# Patient Record
Sex: FEMALE | Race: WHITE
[De-identification: names, ages, dates, MRNs, and addresses within clinical notes are randomized per-mention and may not be internally consistent; named-entity substitution may affect disease eponyms.]

---

## 2021-12-15 ENCOUNTER — HOSPITAL ENCOUNTER (OUTPATIENT)
Age: 71
End: 2021-12-15
Payer: MEDICARE

## 2021-12-15 DIAGNOSIS — C90.00: ICD-10-CM

## 2021-12-15 DIAGNOSIS — R76.8: ICD-10-CM

## 2021-12-15 DIAGNOSIS — M19.041: ICD-10-CM

## 2021-12-15 DIAGNOSIS — M06.4: Primary | ICD-10-CM

## 2021-12-15 LAB — EXAGEN: (no result)

## 2022-01-26 ENCOUNTER — HOSPITAL ENCOUNTER (OUTPATIENT)
Dept: HOSPITAL 100 - MTLAB | Age: 72
Discharge: ANOTHER HOSPITAL | End: 2022-01-26
Payer: MEDICARE

## 2022-01-26 DIAGNOSIS — C90.00: ICD-10-CM

## 2022-01-26 DIAGNOSIS — M19.042: ICD-10-CM

## 2022-01-26 DIAGNOSIS — M19.041: ICD-10-CM

## 2022-01-26 DIAGNOSIS — R76.8: ICD-10-CM

## 2022-01-26 DIAGNOSIS — M06.4: Primary | ICD-10-CM

## 2022-01-26 LAB
ALANINE AMINOTRANSFER ALT/SGPT: 16 U/L (ref 13–56)
ALBUMIN SERPL-MCNC: 3.5 G/DL (ref 3.2–5)
ALKALINE PHOSPHATASE: 86 U/L (ref 45–117)
ANION GAP: 9 (ref 5–15)
AST(SGOT): 13 U/L (ref 15–37)
BUN SERPL-MCNC: 35 MG/DL (ref 7–18)
BUN/CREAT RATIO: 33.3 RATIO (ref 10–20)
CALCIUM SERPL-MCNC: 8.7 MG/DL (ref 8.5–10.1)
CARBON DIOXIDE: 24 MMOL/L (ref 21–32)
CHLORIDE: 107 MMOL/L (ref 98–107)
CREAT UR-MCNC: 215 MG/DL
CRP SERPL-MCNC: < 2.9 MG/L (ref 0–3)
DEPRECATED RDW RBC: 42.9 FL (ref 35.1–43.9)
ERYTHROCYTE [DISTWIDTH] IN BLOOD: 12.4 % (ref 11.6–14.6)
EST GLOM FILT RATE - AFR AMER: 67 ML/MIN (ref 60–?)
GLOBULIN: 4.9 G/DL (ref 2.2–4.2)
GLUCOSE: 107 MG/DL (ref 74–106)
HCT VFR BLD AUTO: 37 % (ref 37–47)
HEMOGLOBIN: 12 G/DL (ref 12–15)
HGB BLD-MCNC: 12 G/DL (ref 12–15)
IMMATURE GRANULOCYTES COUNT: 0 X10^3/UL (ref 0–0)
MCV RBC: 93.7 FL (ref 81–99)
MEAN CORP HGB CONC: 32.4 G/DL (ref 32–36)
MEAN PLATELET VOL.: 11.4 FL (ref 6.2–12)
NRBC FLAGGED BY ANALYZER: 0 % (ref 0–5)
PLATELET # BLD: 173 K/MM3 (ref 150–450)
PLATELET COUNT: 173 K/MM3 (ref 150–450)
POTASSIUM: 3.7 MMOL/L (ref 3.5–5.1)
PROT UR QL STRIP.AUTO: 15 MG/DL
PROT UR-MCNC: 18.6 MG/DL (ref ?–11.9)
PROT/CREAT UR: 87 MG/G CRE (ref 0–200)
RBC # BLD AUTO: 3.95 M/MM3 (ref 4.2–5.4)
RBC DISTRIBUTION WIDTH CV: 12.4 % (ref 11.6–14.6)
RBC DISTRIBUTION WIDTH SD: 42.9 FL (ref 35.1–43.9)
SP GR UR: 1.02 (ref 1–1.03)
URINE PRESERVATIVE: (no result)
WBC # BLD AUTO: 2.9 K/MM3 (ref 4.4–11)
WHITE BLOOD COUNT: 2.9 K/MM3 (ref 4.4–11)

## 2022-01-26 PROCEDURE — 86140 C-REACTIVE PROTEIN: CPT

## 2022-01-26 PROCEDURE — 85025 COMPLETE CBC W/AUTO DIFF WBC: CPT

## 2022-01-26 PROCEDURE — 82570 ASSAY OF URINE CREATININE: CPT

## 2022-01-26 PROCEDURE — 80053 COMPREHEN METABOLIC PANEL: CPT

## 2022-01-26 PROCEDURE — 72170 X-RAY EXAM OF PELVIS: CPT

## 2022-01-26 PROCEDURE — 85652 RBC SED RATE AUTOMATED: CPT

## 2022-01-26 PROCEDURE — 84156 ASSAY OF PROTEIN URINE: CPT

## 2022-01-26 PROCEDURE — 36415 COLL VENOUS BLD VENIPUNCTURE: CPT

## 2022-01-26 PROCEDURE — 81002 URINALYSIS NONAUTO W/O SCOPE: CPT

## 2022-01-28 LAB
DILUTE RUSSELL VIPER VENOM: 39.8 SEC (ref 0–47)
PTT-LA: 35 SEC (ref 0–51.9)
SCREEN DRVVT: 39.8 SEC (ref 0–47)

## 2022-02-23 ENCOUNTER — HOSPITAL ENCOUNTER (OUTPATIENT)
Dept: HOSPITAL 100 - MTLAB | Age: 72
Discharge: HOME | End: 2022-02-23
Payer: MEDICARE

## 2022-02-23 DIAGNOSIS — Z79.899: ICD-10-CM

## 2022-02-23 DIAGNOSIS — C90.00: ICD-10-CM

## 2022-02-23 DIAGNOSIS — M06.4: Primary | ICD-10-CM

## 2022-02-23 DIAGNOSIS — M19.041: ICD-10-CM

## 2022-02-23 DIAGNOSIS — M35.1: ICD-10-CM

## 2022-02-23 LAB
DEPRECATED RDW RBC: 41.9 FL (ref 35.1–43.9)
ERYTHROCYTE [DISTWIDTH] IN BLOOD: 12.4 % (ref 11.6–14.6)
HCT VFR BLD AUTO: 36.2 % (ref 37–47)
HEMOGLOBIN: 12 G/DL (ref 12–15)
HGB BLD-MCNC: 12 G/DL (ref 12–15)
IMMATURE GRANULOCYTES COUNT: 0 X10^3/UL (ref 0–0)
MCV RBC: 93.1 FL (ref 81–99)
MEAN CORP HGB CONC: 33.1 G/DL (ref 32–36)
MEAN PLATELET VOL.: 11.2 FL (ref 6.2–12)
NRBC FLAGGED BY ANALYZER: 0 % (ref 0–5)
PLATELET # BLD: 184 K/MM3 (ref 150–450)
PLATELET COUNT: 184 K/MM3 (ref 150–450)
RBC # BLD AUTO: 3.89 M/MM3 (ref 4.2–5.4)
RBC DISTRIBUTION WIDTH CV: 12.4 % (ref 11.6–14.6)
RBC DISTRIBUTION WIDTH SD: 41.9 FL (ref 35.1–43.9)
WBC # BLD AUTO: 3.3 K/MM3 (ref 4.4–11)
WHITE BLOOD COUNT: 3.3 K/MM3 (ref 4.4–11)

## 2022-02-23 PROCEDURE — 36415 COLL VENOUS BLD VENIPUNCTURE: CPT

## 2022-02-23 PROCEDURE — 85025 COMPLETE CBC W/AUTO DIFF WBC: CPT

## 2022-05-25 ENCOUNTER — HOSPITAL ENCOUNTER (OUTPATIENT)
Dept: HOSPITAL 100 - MTLAB | Age: 72
Discharge: HOME | End: 2022-05-25
Payer: MEDICARE

## 2022-05-25 DIAGNOSIS — C90.00: ICD-10-CM

## 2022-05-25 DIAGNOSIS — M19.041: ICD-10-CM

## 2022-05-25 DIAGNOSIS — Z79.899: ICD-10-CM

## 2022-05-25 DIAGNOSIS — M06.4: Primary | ICD-10-CM

## 2022-05-25 DIAGNOSIS — M35.1: ICD-10-CM

## 2022-05-25 LAB
ALANINE AMINOTRANSFER ALT/SGPT: 19 U/L (ref 13–56)
ALBUMIN SERPL-MCNC: 3.7 G/DL (ref 3.2–5)
ALKALINE PHOSPHATASE: 69 U/L (ref 45–117)
ANION GAP: 7 (ref 5–15)
AST(SGOT): 14 U/L (ref 15–37)
BUN SERPL-MCNC: 29 MG/DL (ref 7–18)
BUN/CREAT RATIO: 27.4 RATIO (ref 10–20)
CALCIUM SERPL-MCNC: 8.8 MG/DL (ref 8.5–10.1)
CARBON DIOXIDE: 25 MMOL/L (ref 21–32)
CHLORIDE: 106 MMOL/L (ref 98–107)
DEPRECATED RDW RBC: 43.4 FL (ref 35.1–43.9)
ERYTHROCYTE [DISTWIDTH] IN BLOOD: 12.5 % (ref 11.6–14.6)
EST GLOM FILT RATE - AFR AMER: 66 ML/MIN (ref 60–?)
GLOBULIN: 4.5 G/DL (ref 2.2–4.2)
GLUCOSE: 89 MG/DL (ref 74–106)
HCT VFR BLD AUTO: 36.3 % (ref 37–47)
HEMOGLOBIN: 12.1 G/DL (ref 12–15)
HGB BLD-MCNC: 12.1 G/DL (ref 12–15)
IMMATURE GRANULOCYTES COUNT: 0 X10^3/UL (ref 0–0)
MCV RBC: 94.5 FL (ref 81–99)
MEAN CORP HGB CONC: 33.3 G/DL (ref 32–36)
MEAN PLATELET VOL.: 11.5 FL (ref 6.2–12)
NRBC FLAGGED BY ANALYZER: 0 % (ref 0–5)
PLATELET # BLD: 166 K/MM3 (ref 150–450)
PLATELET COUNT: 166 K/MM3 (ref 150–450)
POTASSIUM: 3.8 MMOL/L (ref 3.5–5.1)
RBC # BLD AUTO: 3.84 M/MM3 (ref 4.2–5.4)
RBC DISTRIBUTION WIDTH CV: 12.5 % (ref 11.6–14.6)
RBC DISTRIBUTION WIDTH SD: 43.4 FL (ref 35.1–43.9)
WBC # BLD AUTO: 3 K/MM3 (ref 4.4–11)
WHITE BLOOD COUNT: 3 K/MM3 (ref 4.4–11)

## 2022-05-25 PROCEDURE — 36415 COLL VENOUS BLD VENIPUNCTURE: CPT

## 2022-05-25 PROCEDURE — 80053 COMPREHEN METABOLIC PANEL: CPT

## 2022-05-25 PROCEDURE — 85025 COMPLETE CBC W/AUTO DIFF WBC: CPT

## 2023-02-04 PROBLEM — I10 HYPERTENSION: Status: ACTIVE | Noted: 2023-02-04

## 2023-02-04 PROBLEM — D47.2 SMOLDERING MULTIPLE MYELOMA: Status: ACTIVE | Noted: 2023-02-04

## 2023-02-04 PROBLEM — M35.1 MIXED CONNECTIVE TISSUE DISEASE (MULTI): Status: ACTIVE | Noted: 2023-02-04

## 2023-02-04 PROBLEM — F41.9 ANXIETY DISORDER: Status: ACTIVE | Noted: 2023-02-04

## 2023-02-04 PROBLEM — I48.92 ATRIAL FLUTTER (MULTI): Status: ACTIVE | Noted: 2023-02-04

## 2023-02-04 PROBLEM — I48.0 PAROXYSMAL ATRIAL FIBRILLATION (MULTI): Status: ACTIVE | Noted: 2023-02-04

## 2023-03-07 LAB
ALANINE AMINOTRANSFERASE (SGPT) (U/L) IN SER/PLAS: 10 U/L (ref 7–45)
ALBUMIN (G/DL) IN SER/PLAS: 4.1 G/DL (ref 3.4–5)
ALKALINE PHOSPHATASE (U/L) IN SER/PLAS: 65 U/L (ref 33–136)
ANION GAP IN SER/PLAS: 11 MMOL/L (ref 10–20)
ASPARTATE AMINOTRANSFERASE (SGOT) (U/L) IN SER/PLAS: 14 U/L (ref 9–39)
BASOPHILS (10*3/UL) IN BLOOD BY AUTOMATED COUNT: 0.03 X10E9/L (ref 0–0.1)
BASOPHILS/100 LEUKOCYTES IN BLOOD BY AUTOMATED COUNT: 1.2 % (ref 0–2)
BILIRUBIN TOTAL (MG/DL) IN SER/PLAS: 0.6 MG/DL (ref 0–1.2)
CALCIUM (MG/DL) IN SER/PLAS: 9.2 MG/DL (ref 8.6–10.3)
CARBON DIOXIDE, TOTAL (MMOL/L) IN SER/PLAS: 26 MMOL/L (ref 21–32)
CHLORIDE (MMOL/L) IN SER/PLAS: 105 MMOL/L (ref 98–107)
CREATININE (MG/DL) IN SER/PLAS: 1.01 MG/DL (ref 0.5–1.05)
EOSINOPHILS (10*3/UL) IN BLOOD BY AUTOMATED COUNT: 0.08 X10E9/L (ref 0–0.4)
EOSINOPHILS/100 LEUKOCYTES IN BLOOD BY AUTOMATED COUNT: 3.2 % (ref 0–6)
ERYTHROCYTE DISTRIBUTION WIDTH (RATIO) BY AUTOMATED COUNT: 12.6 % (ref 11.5–14.5)
ERYTHROCYTE MEAN CORPUSCULAR HEMOGLOBIN CONCENTRATION (G/DL) BY AUTOMATED: 32.4 G/DL (ref 32–36)
ERYTHROCYTE MEAN CORPUSCULAR VOLUME (FL) BY AUTOMATED COUNT: 97 FL (ref 80–100)
ERYTHROCYTES (10*6/UL) IN BLOOD BY AUTOMATED COUNT: 3.86 X10E12/L (ref 4–5.2)
GFR FEMALE: 59 ML/MIN/1.73M2
GLUCOSE (MG/DL) IN SER/PLAS: 87 MG/DL (ref 74–99)
HEMATOCRIT (%) IN BLOOD BY AUTOMATED COUNT: 37.4 % (ref 36–46)
HEMOGLOBIN (G/DL) IN BLOOD: 12.1 G/DL (ref 12–16)
IMMATURE GRANULOCYTES/100 LEUKOCYTES IN BLOOD BY AUTOMATED COUNT: 0 % (ref 0–0.9)
LEUKOCYTES (10*3/UL) IN BLOOD BY AUTOMATED COUNT: 2.5 X10E9/L (ref 4.4–11.3)
LYMPHOCYTES (10*3/UL) IN BLOOD BY AUTOMATED COUNT: 1.01 X10E9/L (ref 0.8–3)
LYMPHOCYTES/100 LEUKOCYTES IN BLOOD BY AUTOMATED COUNT: 39.9 % (ref 13–44)
MONOCYTES (10*3/UL) IN BLOOD BY AUTOMATED COUNT: 0.29 X10E9/L (ref 0.05–0.8)
MONOCYTES/100 LEUKOCYTES IN BLOOD BY AUTOMATED COUNT: 11.5 % (ref 2–10)
NEUTROPHILS (10*3/UL) IN BLOOD BY AUTOMATED COUNT: 1.12 X10E9/L (ref 1.6–5.5)
NEUTROPHILS/100 LEUKOCYTES IN BLOOD BY AUTOMATED COUNT: 44.2 % (ref 40–80)
PLATELETS (10*3/UL) IN BLOOD AUTOMATED COUNT: 173 X10E9/L (ref 150–450)
POTASSIUM (MMOL/L) IN SER/PLAS: 4 MMOL/L (ref 3.5–5.3)
PROTEIN TOTAL: 7.7 G/DL (ref 6.4–8.2)
SODIUM (MMOL/L) IN SER/PLAS: 138 MMOL/L (ref 136–145)
UREA NITROGEN (MG/DL) IN SER/PLAS: 33 MG/DL (ref 6–23)

## 2023-03-20 ENCOUNTER — OFFICE VISIT (OUTPATIENT)
Dept: PRIMARY CARE | Facility: CLINIC | Age: 73
End: 2023-03-20
Payer: MEDICARE

## 2023-03-20 VITALS
BODY MASS INDEX: 28.57 KG/M2 | DIASTOLIC BLOOD PRESSURE: 68 MMHG | SYSTOLIC BLOOD PRESSURE: 130 MMHG | WEIGHT: 188.5 LBS | HEIGHT: 68 IN | HEART RATE: 88 BPM

## 2023-03-20 DIAGNOSIS — R42 DIZZINESS: ICD-10-CM

## 2023-03-20 DIAGNOSIS — D47.2 SMOLDERING MULTIPLE MYELOMA: ICD-10-CM

## 2023-03-20 DIAGNOSIS — I48.3 TYPICAL ATRIAL FLUTTER (MULTI): Primary | ICD-10-CM

## 2023-03-20 DIAGNOSIS — M35.1 MIXED CONNECTIVE TISSUE DISEASE (MULTI): ICD-10-CM

## 2023-03-20 DIAGNOSIS — I48.0 PAROXYSMAL ATRIAL FIBRILLATION (MULTI): ICD-10-CM

## 2023-03-20 DIAGNOSIS — I10 PRIMARY HYPERTENSION: ICD-10-CM

## 2023-03-20 DIAGNOSIS — F41.8 OTHER SPECIFIED ANXIETY DISORDERS: ICD-10-CM

## 2023-03-20 PROCEDURE — 1160F RVW MEDS BY RX/DR IN RCRD: CPT | Performed by: FAMILY MEDICINE

## 2023-03-20 PROCEDURE — 3078F DIAST BP <80 MM HG: CPT | Performed by: FAMILY MEDICINE

## 2023-03-20 PROCEDURE — 3075F SYST BP GE 130 - 139MM HG: CPT | Performed by: FAMILY MEDICINE

## 2023-03-20 PROCEDURE — 1159F MED LIST DOCD IN RCRD: CPT | Performed by: FAMILY MEDICINE

## 2023-03-20 PROCEDURE — 1036F TOBACCO NON-USER: CPT | Performed by: FAMILY MEDICINE

## 2023-03-20 PROCEDURE — 99214 OFFICE O/P EST MOD 30 MIN: CPT | Performed by: FAMILY MEDICINE

## 2023-03-20 RX ORDER — TRAMADOL HYDROCHLORIDE 50 MG/1
50 TABLET ORAL EVERY 8 HOURS PRN
COMMUNITY
Start: 2023-03-17 | End: 2024-03-05 | Stop reason: WASHOUT

## 2023-03-20 ASSESSMENT — ENCOUNTER SYMPTOMS: DIZZINESS: 1

## 2023-03-20 NOTE — PROGRESS NOTES
Patient presents for periodic surveillance of chronic medical problems.     Subjective  Mirella Renae is a 72 y.o. female who presents for Annual Exam and Dizziness.  Dizziness        A flutter/a fib, on anticoagulation, sees cardiology  HTN, stable  Dizziness, continues to have episodes, worsening, lasting all day. Cardiologist recommended workup for cardiac amyloid, she is not ready to do so.  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   All other systems reviewed and are negative.  .    Objective     Visit Vitals  /68   Pulse 88      Physical Exam  Vitals and nursing note reviewed.   Constitutional:       General: She is not in acute distress.     Appearance: Normal appearance. She is not toxic-appearing.   HENT:      Head: Normocephalic and atraumatic.   Cardiovascular:      Rate and Rhythm: Normal rate and regular rhythm.      Heart sounds: No murmur heard.  Pulmonary:      Effort: Pulmonary effort is normal.      Breath sounds: Normal breath sounds.   Musculoskeletal:      Cervical back: Neck supple. No rigidity.      Comments: Normal gait   Neurological:      General: No focal deficit present.      Mental Status: She is alert and oriented to person, place, and time.   Psychiatric:         Mood and Affect: Mood normal.         Behavior: Behavior normal.         Assessment/Plan   Problem List Items Addressed This Visit          Circulatory    Atrial flutter (CMS/HCC) - Primary    Hypertension    Paroxysmal atrial fibrillation (CMS/HCC)       Other    Anxiety disorder    Relevant Orders    Follow Up In Primary Care    Mixed connective tissue disease (CMS/HCC)    Relevant Orders    Follow Up In Primary Care    Smoldering multiple myeloma    Relevant Orders    Follow Up In Primary Care     Other Visit Diagnoses       Dizziness        Relevant Orders    Referral to Neurology                   Mirtha Benjamin MD

## 2023-04-05 LAB
BASOPHILS (10*3/UL) IN BLOOD BY AUTOMATED COUNT: 0.05 X10E9/L (ref 0–0.1)
BASOPHILS/100 LEUKOCYTES IN BLOOD BY AUTOMATED COUNT: 1.7 % (ref 0–2)
EOSINOPHILS (10*3/UL) IN BLOOD BY AUTOMATED COUNT: 0.11 X10E9/L (ref 0–0.4)
EOSINOPHILS/100 LEUKOCYTES IN BLOOD BY AUTOMATED COUNT: 3.7 % (ref 0–6)
ERYTHROCYTE DISTRIBUTION WIDTH (RATIO) BY AUTOMATED COUNT: 12.3 % (ref 11.5–14.5)
ERYTHROCYTE MEAN CORPUSCULAR HEMOGLOBIN CONCENTRATION (G/DL) BY AUTOMATED: 32.3 G/DL (ref 32–36)
ERYTHROCYTE MEAN CORPUSCULAR VOLUME (FL) BY AUTOMATED COUNT: 96 FL (ref 80–100)
ERYTHROCYTES (10*6/UL) IN BLOOD BY AUTOMATED COUNT: 3.96 X10E12/L (ref 4–5.2)
HEMATOCRIT (%) IN BLOOD BY AUTOMATED COUNT: 37.8 % (ref 36–46)
HEMOGLOBIN (G/DL) IN BLOOD: 12.2 G/DL (ref 12–16)
IMMATURE GRANULOCYTES/100 LEUKOCYTES IN BLOOD BY AUTOMATED COUNT: 0 % (ref 0–0.9)
LEUKOCYTES (10*3/UL) IN BLOOD BY AUTOMATED COUNT: 3 X10E9/L (ref 4.4–11.3)
LYMPHOCYTES (10*3/UL) IN BLOOD BY AUTOMATED COUNT: 1.47 X10E9/L (ref 0.8–3)
LYMPHOCYTES/100 LEUKOCYTES IN BLOOD BY AUTOMATED COUNT: 49.7 % (ref 13–44)
MONOCYTES (10*3/UL) IN BLOOD BY AUTOMATED COUNT: 0.28 X10E9/L (ref 0.05–0.8)
MONOCYTES/100 LEUKOCYTES IN BLOOD BY AUTOMATED COUNT: 9.5 % (ref 2–10)
NEUTROPHILS (10*3/UL) IN BLOOD BY AUTOMATED COUNT: 1.05 X10E9/L (ref 1.6–5.5)
NEUTROPHILS/100 LEUKOCYTES IN BLOOD BY AUTOMATED COUNT: 35.4 % (ref 40–80)
PLATELETS (10*3/UL) IN BLOOD AUTOMATED COUNT: 174 X10E9/L (ref 150–450)

## 2023-05-01 LAB
BASOPHILS (10*3/UL) IN BLOOD BY AUTOMATED COUNT: 0.04 X10E9/L (ref 0–0.1)
BASOPHILS/100 LEUKOCYTES IN BLOOD BY AUTOMATED COUNT: 1.3 % (ref 0–2)
EOSINOPHILS (10*3/UL) IN BLOOD BY AUTOMATED COUNT: 0.15 X10E9/L (ref 0–0.4)
EOSINOPHILS/100 LEUKOCYTES IN BLOOD BY AUTOMATED COUNT: 4.8 % (ref 0–6)
ERYTHROCYTE DISTRIBUTION WIDTH (RATIO) BY AUTOMATED COUNT: 12.5 % (ref 11.5–14.5)
ERYTHROCYTE MEAN CORPUSCULAR HEMOGLOBIN CONCENTRATION (G/DL) BY AUTOMATED: 32.3 G/DL (ref 32–36)
ERYTHROCYTE MEAN CORPUSCULAR VOLUME (FL) BY AUTOMATED COUNT: 96 FL (ref 80–100)
ERYTHROCYTES (10*6/UL) IN BLOOD BY AUTOMATED COUNT: 4.02 X10E12/L (ref 4–5.2)
HEMATOCRIT (%) IN BLOOD BY AUTOMATED COUNT: 38.7 % (ref 36–46)
HEMOGLOBIN (G/DL) IN BLOOD: 12.5 G/DL (ref 12–16)
IMMATURE GRANULOCYTES/100 LEUKOCYTES IN BLOOD BY AUTOMATED COUNT: 0 % (ref 0–0.9)
LEUKOCYTES (10*3/UL) IN BLOOD BY AUTOMATED COUNT: 3.2 X10E9/L (ref 4.4–11.3)
LYMPHOCYTES (10*3/UL) IN BLOOD BY AUTOMATED COUNT: 1.25 X10E9/L (ref 0.8–3)
LYMPHOCYTES/100 LEUKOCYTES IN BLOOD BY AUTOMATED COUNT: 39.7 % (ref 13–44)
MONOCYTES (10*3/UL) IN BLOOD BY AUTOMATED COUNT: 0.35 X10E9/L (ref 0.05–0.8)
MONOCYTES/100 LEUKOCYTES IN BLOOD BY AUTOMATED COUNT: 11.1 % (ref 2–10)
NEUTROPHILS (10*3/UL) IN BLOOD BY AUTOMATED COUNT: 1.36 X10E9/L (ref 1.6–5.5)
NEUTROPHILS/100 LEUKOCYTES IN BLOOD BY AUTOMATED COUNT: 43.1 % (ref 40–80)
PLATELETS (10*3/UL) IN BLOOD AUTOMATED COUNT: 191 X10E9/L (ref 150–450)

## 2023-06-21 LAB
ALANINE AMINOTRANSFERASE (SGPT) (U/L) IN SER/PLAS: 13 U/L (ref 7–45)
ALBUMIN (G/DL) IN SER/PLAS: 4 G/DL (ref 3.4–5)
ALKALINE PHOSPHATASE (U/L) IN SER/PLAS: 76 U/L (ref 33–136)
ANION GAP IN SER/PLAS: 10 MMOL/L (ref 10–20)
ASPARTATE AMINOTRANSFERASE (SGOT) (U/L) IN SER/PLAS: 15 U/L (ref 9–39)
BASOPHILS (10*3/UL) IN BLOOD BY AUTOMATED COUNT: 0.02 X10E9/L (ref 0–0.1)
BASOPHILS/100 LEUKOCYTES IN BLOOD BY AUTOMATED COUNT: 0.7 % (ref 0–2)
BILIRUBIN TOTAL (MG/DL) IN SER/PLAS: 0.6 MG/DL (ref 0–1.2)
CALCIUM (MG/DL) IN SER/PLAS: 9.2 MG/DL (ref 8.6–10.3)
CARBON DIOXIDE, TOTAL (MMOL/L) IN SER/PLAS: 24 MMOL/L (ref 21–32)
CHLORIDE (MMOL/L) IN SER/PLAS: 106 MMOL/L (ref 98–107)
CREATININE (MG/DL) IN SER/PLAS: 1 MG/DL (ref 0.5–1.05)
EOSINOPHILS (10*3/UL) IN BLOOD BY AUTOMATED COUNT: 0.11 X10E9/L (ref 0–0.4)
EOSINOPHILS/100 LEUKOCYTES IN BLOOD BY AUTOMATED COUNT: 3.7 % (ref 0–6)
ERYTHROCYTE DISTRIBUTION WIDTH (RATIO) BY AUTOMATED COUNT: 12.8 % (ref 11.5–14.5)
ERYTHROCYTE MEAN CORPUSCULAR HEMOGLOBIN CONCENTRATION (G/DL) BY AUTOMATED: 32.7 G/DL (ref 32–36)
ERYTHROCYTE MEAN CORPUSCULAR VOLUME (FL) BY AUTOMATED COUNT: 96 FL (ref 80–100)
ERYTHROCYTES (10*6/UL) IN BLOOD BY AUTOMATED COUNT: 3.88 X10E12/L (ref 4–5.2)
GFR FEMALE: 60 ML/MIN/1.73M2
GLUCOSE (MG/DL) IN SER/PLAS: 93 MG/DL (ref 74–99)
HEMATOCRIT (%) IN BLOOD BY AUTOMATED COUNT: 37.3 % (ref 36–46)
HEMOGLOBIN (G/DL) IN BLOOD: 12.2 G/DL (ref 12–16)
IMMATURE GRANULOCYTES/100 LEUKOCYTES IN BLOOD BY AUTOMATED COUNT: 0.3 % (ref 0–0.9)
LEUKOCYTES (10*3/UL) IN BLOOD BY AUTOMATED COUNT: 3 X10E9/L (ref 4.4–11.3)
LYMPHOCYTES (10*3/UL) IN BLOOD BY AUTOMATED COUNT: 1.25 X10E9/L (ref 0.8–3)
LYMPHOCYTES/100 LEUKOCYTES IN BLOOD BY AUTOMATED COUNT: 42.1 % (ref 13–44)
MONOCYTES (10*3/UL) IN BLOOD BY AUTOMATED COUNT: 0.31 X10E9/L (ref 0.05–0.8)
MONOCYTES/100 LEUKOCYTES IN BLOOD BY AUTOMATED COUNT: 10.4 % (ref 2–10)
NEUTROPHILS (10*3/UL) IN BLOOD BY AUTOMATED COUNT: 1.27 X10E9/L (ref 1.6–5.5)
NEUTROPHILS/100 LEUKOCYTES IN BLOOD BY AUTOMATED COUNT: 42.8 % (ref 40–80)
PLATELETS (10*3/UL) IN BLOOD AUTOMATED COUNT: 168 X10E9/L (ref 150–450)
POTASSIUM (MMOL/L) IN SER/PLAS: 4.3 MMOL/L (ref 3.5–5.3)
PROTEIN TOTAL: 7.8 G/DL (ref 6.4–8.2)
SODIUM (MMOL/L) IN SER/PLAS: 136 MMOL/L (ref 136–145)
UREA NITROGEN (MG/DL) IN SER/PLAS: 30 MG/DL (ref 6–23)

## 2023-09-20 ENCOUNTER — LAB (OUTPATIENT)
Dept: LAB | Facility: LAB | Age: 73
End: 2023-09-20
Payer: MEDICARE

## 2023-09-20 DIAGNOSIS — I10 PRIMARY HYPERTENSION: ICD-10-CM

## 2023-09-20 LAB
ALANINE AMINOTRANSFERASE (SGPT) (U/L) IN SER/PLAS: 11 U/L (ref 7–45)
ALBUMIN (G/DL) IN SER/PLAS: 4 G/DL (ref 3.4–5)
ALKALINE PHOSPHATASE (U/L) IN SER/PLAS: 67 U/L (ref 33–136)
ANION GAP IN SER/PLAS: 10 MMOL/L (ref 10–20)
ASPARTATE AMINOTRANSFERASE (SGOT) (U/L) IN SER/PLAS: 13 U/L (ref 9–39)
BASOPHILS (10*3/UL) IN BLOOD BY AUTOMATED COUNT: 0.02 X10E9/L (ref 0–0.1)
BASOPHILS/100 LEUKOCYTES IN BLOOD BY AUTOMATED COUNT: 0.7 % (ref 0–2)
BILIRUBIN TOTAL (MG/DL) IN SER/PLAS: 0.6 MG/DL (ref 0–1.2)
CALCIUM (MG/DL) IN SER/PLAS: 8.8 MG/DL (ref 8.6–10.3)
CARBON DIOXIDE, TOTAL (MMOL/L) IN SER/PLAS: 25 MMOL/L (ref 21–32)
CHLORIDE (MMOL/L) IN SER/PLAS: 106 MMOL/L (ref 98–107)
CHOLESTEROL (MG/DL) IN SER/PLAS: 132 MG/DL (ref 0–199)
CHOLESTEROL IN HDL (MG/DL) IN SER/PLAS: 36 MG/DL
CHOLESTEROL/HDL RATIO: 3.7
COBALAMIN (VITAMIN B12) (PG/ML) IN SER/PLAS: 296 PG/ML (ref 211–911)
CREATININE (MG/DL) IN SER/PLAS: 1.03 MG/DL (ref 0.5–1.05)
EOSINOPHILS (10*3/UL) IN BLOOD BY AUTOMATED COUNT: 0.05 X10E9/L (ref 0–0.4)
EOSINOPHILS/100 LEUKOCYTES IN BLOOD BY AUTOMATED COUNT: 1.8 % (ref 0–6)
ERYTHROCYTE DISTRIBUTION WIDTH (RATIO) BY AUTOMATED COUNT: 12.5 % (ref 11.5–14.5)
ERYTHROCYTE MEAN CORPUSCULAR HEMOGLOBIN CONCENTRATION (G/DL) BY AUTOMATED: 32 G/DL (ref 32–36)
ERYTHROCYTE MEAN CORPUSCULAR VOLUME (FL) BY AUTOMATED COUNT: 97 FL (ref 80–100)
ERYTHROCYTES (10*6/UL) IN BLOOD BY AUTOMATED COUNT: 3.97 X10E12/L (ref 4–5.2)
GFR FEMALE: 58 ML/MIN/1.73M2
GLUCOSE (MG/DL) IN SER/PLAS: 90 MG/DL (ref 74–99)
HEMATOCRIT (%) IN BLOOD BY AUTOMATED COUNT: 38.4 % (ref 36–46)
HEMOGLOBIN (G/DL) IN BLOOD: 12.3 G/DL (ref 12–16)
IMMATURE GRANULOCYTES/100 LEUKOCYTES IN BLOOD BY AUTOMATED COUNT: 0 % (ref 0–0.9)
LDL: 71 MG/DL (ref 0–99)
LEUKOCYTES (10*3/UL) IN BLOOD BY AUTOMATED COUNT: 2.8 X10E9/L (ref 4.4–11.3)
LYMPHOCYTES (10*3/UL) IN BLOOD BY AUTOMATED COUNT: 1.05 X10E9/L (ref 0.8–3)
LYMPHOCYTES/100 LEUKOCYTES IN BLOOD BY AUTOMATED COUNT: 37.6 % (ref 13–44)
MONOCYTES (10*3/UL) IN BLOOD BY AUTOMATED COUNT: 0.28 X10E9/L (ref 0.05–0.8)
MONOCYTES/100 LEUKOCYTES IN BLOOD BY AUTOMATED COUNT: 10 % (ref 2–10)
NEUTROPHILS (10*3/UL) IN BLOOD BY AUTOMATED COUNT: 1.39 X10E9/L (ref 1.6–5.5)
NEUTROPHILS/100 LEUKOCYTES IN BLOOD BY AUTOMATED COUNT: 49.9 % (ref 40–80)
PLATELETS (10*3/UL) IN BLOOD AUTOMATED COUNT: 181 X10E9/L (ref 150–450)
POTASSIUM (MMOL/L) IN SER/PLAS: 4.1 MMOL/L (ref 3.5–5.3)
PROTEIN TOTAL: 7.6 G/DL (ref 6.4–8.2)
SODIUM (MMOL/L) IN SER/PLAS: 137 MMOL/L (ref 136–145)
THYROTROPIN (MIU/L) IN SER/PLAS BY DETECTION LIMIT <= 0.05 MIU/L: 1.77 MIU/L (ref 0.44–3.98)
TRIGLYCERIDE (MG/DL) IN SER/PLAS: 124 MG/DL (ref 0–149)
UREA NITROGEN (MG/DL) IN SER/PLAS: 26 MG/DL (ref 6–23)
VLDL: 25 MG/DL (ref 0–40)

## 2023-09-20 PROCEDURE — 84443 ASSAY THYROID STIM HORMONE: CPT

## 2023-09-20 PROCEDURE — 36415 COLL VENOUS BLD VENIPUNCTURE: CPT

## 2023-09-20 PROCEDURE — 82607 VITAMIN B-12: CPT

## 2023-09-20 PROCEDURE — 80061 LIPID PANEL: CPT

## 2023-09-25 ENCOUNTER — OFFICE VISIT (OUTPATIENT)
Dept: PRIMARY CARE | Facility: CLINIC | Age: 73
End: 2023-09-25
Payer: MEDICARE

## 2023-09-25 VITALS
HEIGHT: 68 IN | HEART RATE: 84 BPM | SYSTOLIC BLOOD PRESSURE: 134 MMHG | BODY MASS INDEX: 29.24 KG/M2 | DIASTOLIC BLOOD PRESSURE: 78 MMHG | WEIGHT: 192.9 LBS

## 2023-09-25 DIAGNOSIS — C90.00 MULTIPLE MYELOMA NOT HAVING ACHIEVED REMISSION (MULTI): Primary | ICD-10-CM

## 2023-09-25 DIAGNOSIS — D47.2 SMOLDERING MULTIPLE MYELOMA: ICD-10-CM

## 2023-09-25 DIAGNOSIS — I10 PRIMARY HYPERTENSION: ICD-10-CM

## 2023-09-25 DIAGNOSIS — F41.8 OTHER SPECIFIED ANXIETY DISORDERS: ICD-10-CM

## 2023-09-25 DIAGNOSIS — Z12.31 SCREENING MAMMOGRAM FOR BREAST CANCER: ICD-10-CM

## 2023-09-25 DIAGNOSIS — M35.1 MIXED CONNECTIVE TISSUE DISEASE (MULTI): ICD-10-CM

## 2023-09-25 DIAGNOSIS — Z28.21 INFLUENZA VACCINATION DECLINED: ICD-10-CM

## 2023-09-25 DIAGNOSIS — Z23 NEED FOR PNEUMOCOCCAL VACCINATION: ICD-10-CM

## 2023-09-25 PROBLEM — I48.92 ATRIAL FLUTTER (MULTI): Status: RESOLVED | Noted: 2023-02-04 | Resolved: 2023-09-25

## 2023-09-25 PROCEDURE — 90677 PCV20 VACCINE IM: CPT | Performed by: FAMILY MEDICINE

## 2023-09-25 PROCEDURE — 1036F TOBACCO NON-USER: CPT | Performed by: FAMILY MEDICINE

## 2023-09-25 PROCEDURE — 3078F DIAST BP <80 MM HG: CPT | Performed by: FAMILY MEDICINE

## 2023-09-25 PROCEDURE — 1123F ACP DISCUSS/DSCN MKR DOCD: CPT | Performed by: FAMILY MEDICINE

## 2023-09-25 PROCEDURE — G0439 PPPS, SUBSEQ VISIT: HCPCS | Performed by: FAMILY MEDICINE

## 2023-09-25 PROCEDURE — 1159F MED LIST DOCD IN RCRD: CPT | Performed by: FAMILY MEDICINE

## 2023-09-25 PROCEDURE — 99214 OFFICE O/P EST MOD 30 MIN: CPT | Performed by: FAMILY MEDICINE

## 2023-09-25 PROCEDURE — 3075F SYST BP GE 130 - 139MM HG: CPT | Performed by: FAMILY MEDICINE

## 2023-09-25 PROCEDURE — 1170F FXNL STATUS ASSESSED: CPT | Performed by: FAMILY MEDICINE

## 2023-09-25 PROCEDURE — 1160F RVW MEDS BY RX/DR IN RCRD: CPT | Performed by: FAMILY MEDICINE

## 2023-09-25 PROCEDURE — G0009 ADMIN PNEUMOCOCCAL VACCINE: HCPCS | Performed by: FAMILY MEDICINE

## 2023-09-25 RX ORDER — VALSARTAN AND HYDROCHLOROTHIAZIDE 160; 12.5 MG/1; MG/1
1 TABLET, FILM COATED ORAL DAILY
Qty: 90 TABLET | Refills: 3 | Status: SHIPPED | OUTPATIENT
Start: 2023-09-25 | End: 2023-09-25

## 2023-09-25 ASSESSMENT — ACTIVITIES OF DAILY LIVING (ADL)
GROCERY_SHOPPING: INDEPENDENT
GROCERY_SHOPPING: INDEPENDENT
DRESSING: INDEPENDENT
MANAGING_FINANCES: INDEPENDENT
DRESSING: INDEPENDENT
BATHING: INDEPENDENT
DOING_HOUSEWORK: INDEPENDENT
MANAGING_FINANCES: INDEPENDENT
TAKING_MEDICATION: INDEPENDENT
TAKING_MEDICATION: INDEPENDENT
DOING_HOUSEWORK: INDEPENDENT
BATHING: INDEPENDENT

## 2023-09-25 ASSESSMENT — PATIENT HEALTH QUESTIONNAIRE - PHQ9
2. FEELING DOWN, DEPRESSED OR HOPELESS: NOT AT ALL
1. LITTLE INTEREST OR PLEASURE IN DOING THINGS: NOT AT ALL
SUM OF ALL RESPONSES TO PHQ9 QUESTIONS 1 AND 2: 0
SUM OF ALL RESPONSES TO PHQ9 QUESTIONS 1 AND 2: 0
1. LITTLE INTEREST OR PLEASURE IN DOING THINGS: NOT AT ALL
2. FEELING DOWN, DEPRESSED OR HOPELESS: NOT AT ALL

## 2023-09-25 NOTE — PROGRESS NOTES
Patient presents for periodic surveillance of chronic medical problems.     Subjective  Mirella Renae is a 72 y.o. female who presents for Annual Exam.  HPI  A flutter, on chronic anticoagulation  Htnm, stable  Multiple myeloma, questionable cardiac involvement per patient working with cardiology and hematology  Anxiety stable  MCTD, sees rheum  Declines influenza vaccine, candidate for pRevnar 20    Review of Systems   All other systems reviewed and are negative.  .    Objective     Visit Vitals  /78 (BP Location: Left arm, Patient Position: Sitting)   Pulse 84      Physical Exam  Vitals and nursing note reviewed.   Constitutional:       General: She is not in acute distress.     Appearance: Normal appearance. She is not toxic-appearing.   HENT:      Head: Normocephalic and atraumatic.      Right Ear: Ear canal and external ear normal.   Cardiovascular:      Rate and Rhythm: Normal rate and regular rhythm.      Heart sounds: No murmur heard.  Pulmonary:      Effort: Pulmonary effort is normal.      Breath sounds: Normal breath sounds.   Musculoskeletal:      Cervical back: Neck supple. No rigidity.      Comments: Normal gait   Skin:     General: Skin is warm and dry.   Neurological:      General: No focal deficit present.      Mental Status: She is alert and oriented to person, place, and time.   Psychiatric:         Mood and Affect: Mood normal.         Behavior: Behavior normal.         Assessment/Plan   Problem List Items Addressed This Visit       Anxiety disorder    Hypertension    Relevant Orders    CBC and Auto Differential    Comprehensive Metabolic Panel    Lipid Panel    TSH with reflex to Free T4 if abnormal    Vitamin B12    Mixed connective tissue disease (CMS/HCC)    Smoldering multiple myeloma    Multiple myeloma not having achieved remission (CMS/HCC) - Primary     Other Visit Diagnoses       Screening mammogram for breast cancer        Relevant Orders    BI mammo bilateral screening  tomosynthesis    Influenza vaccination declined        Need for pneumococcal vaccination        Relevant Orders    Pneumococcal conjugate vaccine, 20-valent, adult (PREVNAR 20)                   Mirtha Benjamin MD Patient was identified as a fall risk. Risk prevention instructions provided.

## 2023-09-25 NOTE — PATIENT INSTRUCTIONS
Followup annually, labs prior, call concerns.      Ways to Help Prevent Falls at Home    Quick Tips   ? Ask for help if you need it. Most people want to help!   ? Get up slowly after sitting or laying down   ? Wear a medical alert device or keep cell phone in your pocket   ? Use night lights, especially areas near a bathroom   ? Keep the items you use often within reach on a small stool or end table   ? Use an assistive device such as walker or cane, as directed by provider/physical therapy   ? Use a non-slip mat and grab bars in your bathroom. Look for home health sections for best options     Other Areas to Focus On   ? Exercise and nutrition: Regular exercise or taking a falls prevention class are great ways improve strength and balance. Don’t forget to stay hydrated and bring a snack!   ? Medicine side effects: Some medicines can make you sleepy or dizzy, which could cause a fall. Ask your healthcare provider about the side effects your medicines could cause. Be sure to let them know if you take any vitamins or supplements as well.   ? Tripping hazards: Remove items you could trip on, such as loose mats, rugs, cords, and clutter. Wear closed toe shoes with rubber soles.   ? Health and wellness: Get regular checkups with your healthcare provider, plus routine vision and hearing screenings. Talk with your healthcare provider about:   o Your medicines and the possible side effects - bring them in a bag if that is easier!   o Problems with balance or feeling dizzy   o Ways to promote bone health, such as Vitamin D and calcium supplements   o Questions or concerns about falling     *Ask your healthcare team if you have questions     [facility], 2022

## 2023-10-31 ENCOUNTER — PHARMACY VISIT (OUTPATIENT)
Dept: PHARMACY | Facility: CLINIC | Age: 73
End: 2023-10-31
Payer: COMMERCIAL

## 2023-10-31 PROCEDURE — RXMED WILLOW AMBULATORY MEDICATION CHARGE

## 2023-11-09 ENCOUNTER — ANCILLARY PROCEDURE (OUTPATIENT)
Dept: RADIOLOGY | Facility: CLINIC | Age: 73
End: 2023-11-09
Payer: MEDICARE

## 2023-11-09 DIAGNOSIS — Z12.31 SCREENING MAMMOGRAM FOR BREAST CANCER: ICD-10-CM

## 2023-11-09 PROCEDURE — 77063 BREAST TOMOSYNTHESIS BI: CPT

## 2023-11-09 PROCEDURE — 77067 SCR MAMMO BI INCL CAD: CPT | Mod: BILATERAL PROCEDURE | Performed by: RADIOLOGY

## 2023-11-09 PROCEDURE — 77063 BREAST TOMOSYNTHESIS BI: CPT | Mod: BILATERAL PROCEDURE | Performed by: RADIOLOGY

## 2023-11-17 ENCOUNTER — APPOINTMENT (OUTPATIENT)
Dept: HEMATOLOGY/ONCOLOGY | Facility: CLINIC | Age: 73
End: 2023-11-17
Payer: MEDICARE

## 2023-11-21 ENCOUNTER — LAB (OUTPATIENT)
Dept: LAB | Facility: LAB | Age: 73
End: 2023-11-21
Payer: MEDICARE

## 2023-11-21 ENCOUNTER — APPOINTMENT (OUTPATIENT)
Dept: HEMATOLOGY/ONCOLOGY | Facility: CLINIC | Age: 73
End: 2023-11-21
Payer: MEDICARE

## 2023-11-21 DIAGNOSIS — C90.00 MULTIPLE MYELOMA NOT HAVING ACHIEVED REMISSION (MULTI): ICD-10-CM

## 2023-11-21 LAB
ALBUMIN SERPL BCP-MCNC: 4 G/DL (ref 3.4–5)
ALP SERPL-CCNC: 74 U/L (ref 33–136)
ALT SERPL W P-5'-P-CCNC: 10 U/L (ref 7–45)
ANION GAP SERPL CALC-SCNC: 11 MMOL/L (ref 10–20)
AST SERPL W P-5'-P-CCNC: 13 U/L (ref 9–39)
BASOPHILS # BLD AUTO: 0.03 X10*3/UL (ref 0–0.1)
BASOPHILS NFR BLD AUTO: 1.2 %
BILIRUB SERPL-MCNC: 0.5 MG/DL (ref 0–1.2)
BUN SERPL-MCNC: 25 MG/DL (ref 6–23)
CALCIUM SERPL-MCNC: 9.1 MG/DL (ref 8.6–10.3)
CHLORIDE SERPL-SCNC: 105 MMOL/L (ref 98–107)
CO2 SERPL-SCNC: 24 MMOL/L (ref 21–32)
CREAT SERPL-MCNC: 1 MG/DL (ref 0.5–1.05)
EOSINOPHIL # BLD AUTO: 0.07 X10*3/UL (ref 0–0.4)
EOSINOPHIL NFR BLD AUTO: 2.8 %
ERYTHROCYTE [DISTWIDTH] IN BLOOD BY AUTOMATED COUNT: 12.6 % (ref 11.5–14.5)
GFR SERPL CREATININE-BSD FRML MDRD: 60 ML/MIN/1.73M*2
GLUCOSE SERPL-MCNC: 91 MG/DL (ref 74–99)
HCT VFR BLD AUTO: 37.9 % (ref 36–46)
HGB BLD-MCNC: 12.2 G/DL (ref 12–16)
IGA SERPL-MCNC: 61 MG/DL (ref 70–400)
IGG SERPL-MCNC: 2680 MG/DL (ref 700–1600)
IGM SERPL-MCNC: 67 MG/DL (ref 40–230)
IMM GRANULOCYTES # BLD AUTO: 0.01 X10*3/UL (ref 0–0.5)
IMM GRANULOCYTES NFR BLD AUTO: 0.4 % (ref 0–0.9)
LYMPHOCYTES # BLD AUTO: 1.06 X10*3/UL (ref 0.8–3)
LYMPHOCYTES NFR BLD AUTO: 43.1 %
MCH RBC QN AUTO: 31.4 PG (ref 26–34)
MCHC RBC AUTO-ENTMCNC: 32.2 G/DL (ref 32–36)
MCV RBC AUTO: 97 FL (ref 80–100)
MONOCYTES # BLD AUTO: 0.28 X10*3/UL (ref 0.05–0.8)
MONOCYTES NFR BLD AUTO: 11.4 %
NEUTROPHILS # BLD AUTO: 1.01 X10*3/UL (ref 1.6–5.5)
NEUTROPHILS NFR BLD AUTO: 41.1 %
NRBC BLD-RTO: 0 /100 WBCS (ref 0–0)
PLATELET # BLD AUTO: 163 X10*3/UL (ref 150–450)
POTASSIUM SERPL-SCNC: 4.2 MMOL/L (ref 3.5–5.3)
PROT SERPL-MCNC: 7.8 G/DL (ref 6.4–8.2)
PROT SERPL-MCNC: 7.9 G/DL (ref 6.4–8.2)
RBC # BLD AUTO: 3.89 X10*6/UL (ref 4–5.2)
SODIUM SERPL-SCNC: 136 MMOL/L (ref 136–145)
VIT B12 SERPL-MCNC: 344 PG/ML (ref 211–911)
WBC # BLD AUTO: 2.5 X10*3/UL (ref 4.4–11.3)

## 2023-11-21 PROCEDURE — 36415 COLL VENOUS BLD VENIPUNCTURE: CPT

## 2023-11-21 PROCEDURE — 84165 PROTEIN E-PHORESIS SERUM: CPT

## 2023-11-21 PROCEDURE — 82784 ASSAY IGA/IGD/IGG/IGM EACH: CPT

## 2023-11-21 PROCEDURE — 85025 COMPLETE CBC W/AUTO DIFF WBC: CPT

## 2023-11-21 PROCEDURE — 82607 VITAMIN B-12: CPT

## 2023-11-21 PROCEDURE — 83521 IG LIGHT CHAINS FREE EACH: CPT

## 2023-11-21 PROCEDURE — 86320 SERUM IMMUNOELECTROPHORESIS: CPT | Performed by: INTERNAL MEDICINE

## 2023-11-21 PROCEDURE — 84165 PROTEIN E-PHORESIS SERUM: CPT | Performed by: INTERNAL MEDICINE

## 2023-11-21 PROCEDURE — 84155 ASSAY OF PROTEIN SERUM: CPT

## 2023-11-21 PROCEDURE — 80053 COMPREHEN METABOLIC PANEL: CPT

## 2023-11-21 PROCEDURE — 86334 IMMUNOFIX E-PHORESIS SERUM: CPT

## 2023-11-22 LAB
KAPPA LC SERPL-MCNC: 4.4 MG/DL (ref 0.33–1.94)
KAPPA LC/LAMBDA SER: 4 {RATIO} (ref 0.26–1.65)
LAMBDA LC SERPL-MCNC: 1.1 MG/DL (ref 0.57–2.63)

## 2023-11-29 ENCOUNTER — TELEMEDICINE (OUTPATIENT)
Dept: HEMATOLOGY/ONCOLOGY | Facility: CLINIC | Age: 73
End: 2023-11-29
Payer: MEDICARE

## 2023-11-29 VITALS — WEIGHT: 185.38 LBS | BODY MASS INDEX: 28.19 KG/M2

## 2023-11-29 DIAGNOSIS — C90.00 MULTIPLE MYELOMA NOT HAVING ACHIEVED REMISSION (MULTI): ICD-10-CM

## 2023-11-29 DIAGNOSIS — D70.9 NEUTROPENIA, UNSPECIFIED TYPE (CMS-HCC): ICD-10-CM

## 2023-11-29 DIAGNOSIS — D47.2 SMOLDERING MULTIPLE MYELOMA: Primary | ICD-10-CM

## 2023-11-29 DIAGNOSIS — I42.9 CARDIOMYOPATHY, UNSPECIFIED TYPE (MULTI): ICD-10-CM

## 2023-11-29 LAB
ALBUMIN: 4.2 G/DL (ref 3.4–5)
ALPHA 1 GLOBULIN: 0.3 G/DL (ref 0.2–0.6)
ALPHA 2 GLOBULIN: 0.6 G/DL (ref 0.4–1.1)
BETA GLOBULIN: 0.6 G/DL (ref 0.5–1.2)
GAMMA GLOBULIN: 2.3 G/DL (ref 0.5–1.4)
IMMUNOFIXATION COMMENT: ABNORMAL
M-PROTEIN 1: 1.4 G/DL
PATH REVIEW - SERUM IMMUNOFIXATION: ABNORMAL
PATH REVIEW-SERUM PROTEIN ELECTROPHORESIS: ABNORMAL
PROTEIN ELECTROPHORESIS COMMENT: ABNORMAL

## 2023-11-29 PROCEDURE — 99214 OFFICE O/P EST MOD 30 MIN: CPT | Performed by: INTERNAL MEDICINE

## 2023-11-29 ASSESSMENT — PAIN SCALES - GENERAL: PAINLEVEL: 0-NO PAIN

## 2023-11-29 ASSESSMENT — NCCN CANCER DISTRESS MANAGEMENT: NCCN PHYSICAL CONCERNS: 1

## 2023-11-29 ASSESSMENT — ENCOUNTER SYMPTOMS
DEPRESSION: 0
OCCASIONAL FEELINGS OF UNSTEADINESS: 0
LOSS OF SENSATION IN FEET: 0

## 2023-11-29 NOTE — PATIENT INSTRUCTIONS
Today you met with your hematologist/oncologist.  Recent labs were discussed and questions answered.  Scheduling orders were placed.  While we appreciate that you verbalized understanding, if any questions arise after leaving, please do not hesitate to call the office to discuss.  [phone] Jian Lizama. Please have labs drawn one week prior to your next appointment at any  lab. They have been electronically entered and can be drawn at any  lab on a walk in basis. If for any reason you are unable to get your CT scan or labs done prior to your next appt, please call the office to be rescheduled accordingly.

## 2023-11-29 NOTE — PROGRESS NOTES
Patient ID: Candace Elmore is a 72 y.o. female.  Referring Physician: No referring provider defined for this encounter.  Primary Care Provider: Mirtha Benjamin MD      Subjective    HPI  ID Statement:    CANDACE ELMORE is a 72 year old Female        Chief Complaint: Evaluation for leukopenia   Interval History:    Referred by Dr. Mirtha Benjamin      Reason for referral: Leukopenia      HPI   70 year old woman with hx of HTN, anxiety, who is referred for leukopenia, her labs on 6/30/ 2021 and on 8/18/21 showed wbc of 2.4K and  2.6K respectively, ANC was 1.20 and 1.12 respectively, there were 1% atypical lymphs on the first differential. Hg was normal at 12.1 g/dl and platelets normal at 176. B12 level was 288.      she is feeling well in general   she had a thumb infection and cellulitis after he cat bit her, she received PO antibiotics and it resolves  no fever, no UTI, no hx of pneumonia or other infections   no bleeding issues, no hematochezia or melena   she has good level of energy  and is still working part time    she has some arthritic pain in hip and lower back, no stiffness or swelling in the hand or feet joints   no abdominal , nausea or vomiting, no weight loss   no change in bowel habits   she just had a mmg that negative and is awaiting to have a colonoscopy   no headaches, mild intermittent short episodes of dizziness   no weakness or numbness   lost some weight last year but gained back      had her colonoscopy done march 2022 which was unremarkable      interval history - 11/29/23- Telehealth Visit due to COVID19 national emergency and related precautions     she continues to have her dizziness episodes   Variable in intensity and frequency without a clear pattern   she has seen neurology who though it is more consistent with presyncopal episodes   has seen cardiology who recommended a cardiac biopsy for amyloid rule out  She has not decided on a final decision regarding that  she otherwise has no nausea,  vomiting   no fever or infections   Energy and fatigue variable   eating well   No other new complaints      PAST MEDICAL HISTORY:   HTN, anxiety  muscle tumor removal   appendectomy   tubal ligation      takes a mvi from OT      SOCIAL HISTORY:   works as a consultant and    no cigarette smoking  very occasional alcohol   has 2 children      FAMILY HISTORY:    colon cancer runs in family, her mother and grandmother had it, grandmother was in her 40s   No other specific history of bleeding, clotting or malignant disorder in the family.     REVIEW OF SYSTEMS:  Pertinent finding as per the history above.  There are no additional specific symptoms pertaining to eyes, ENT, hematologic, lymphatic, neurological, psychiatric, cardiac,  pulmonary, GI, , endocrine, rheumatic, dermatological, or musculoskeletal systems.  All other systems have been reviewed and generally negative and noncontributory.     PHYSICAL EXAMINATION:    GENERAL:  Age-appropriate, in no acute discomfort.     HEENT:  Normocephalic and atraumatic.   NECK:  Supple   CHEST:  breathing non labored   EXTREMITIES:  No cyanosis, clubbing, or edema.  NEUROLOGICAL:  Alert, awake, and oriented     LAB DATA:  Latest labs were reviewed in the EMR and from the outside sources.      Objective   BSA: 2.01 meters squared  Wt 84.1 kg (185 lb 6.2 oz)   BMI 28.19 kg/m²     Family History   Problem Relation Name Age of Onset    Coronary artery disease Mother      Hyperlipidemia Mother      Hypertension Mother      Colon cancer Mother      Heart attack Mother      Thyroid disease Mother      Thyroid cancer Son      Colon cancer Other       Oncology History    No history exists.       Mirella STEWARD Batool  reports that she has never smoked. She has never used smokeless tobacco.  She  reports that she does not currently use alcohol.  She  reports no history of drug use.    Physical Exam    Performance Status:  Symptomatic; fully ambulatory    Assessment/Plan       1. Smoldering multiple myeloma   her evaluation led to the diagnosis of smoldering multiple myeloma   bone marrow biopsy showed 10-15% plasma cells   no evidence of end organ damage , PET scan without any bone lesions   low risk SMM based on current M spike , FLC and PC percentage   plan for surveillance , the repeat labs demonstrate overall stability of her counts as well as of the paraprotein levels      8/15/22- the repeat SPEP, FLC and IG demonstrate stability, serum IG slightly improved, the M protein has been stable at 1.5-1.7 g/dl and at this time we will not pursue additional testing or therapies   however she has lower back pain and we will investigate with an MRI of the lumbar spine to rule out myelomatous involvement   will also check a dexa scan and vit D levels with next labs and consider anti resorptive therapy depending on results      12/21/22- she had repeat labs showing stability of the M spike, SPEP, FLC and the serum IG   at this time no emerging end organ damage, will plan to continue surveillance , monitor the kidney function, cr fluctuates slightly      4/28/23- she has noticed increasing dizziness episodes   additionally secondary to her arrythmia, cardiology suggested doing a cardiac MRI but she cannot secondary to marked claustrophobia and is not excited for a cardiac biopsy      I thinks she needs neurologic evaluation for autonomic dysfunction   additionally will request pathology to run an amyloid stain on the prior bone marrow biopsy   Cr slightly higher, obtain 24 hrs urine collection for protein and UPEP   add BNP and troponin to next labs      if all the above testing is negative, her counts and serum proteins are otherwise stable and will continue surveillance   if testing is suggestive of AL amyloidosis then will warrant therapy      8/19- still suffering from likely symptoms of autonomic dysfunction   also cardiology recommending the investigation for cardiac amyloid with a  bx, she is thinking about it   her laboratory parameters have not changed, the amyloid stain on BMBx was negative   will hold off any treatment , unless proven to have amyloidosis as there are no other organ dysfunction     11/29- myeloma labs slightly higher, and ANC lower   We discussed to repeat PET scan to which she agreed and to repeat marrow exam which we agreed to defer until next lab draw to establish trend   She is considering the endomyocardial biopsy, will repeat BNP as well      2. Leukopenia with mild neutropenia   differential diagnosis and work up discussed with patient   plan to obtain the following testing   -nutritional deficiencies: check folic acid and start PO b12 for borderline level   -SPEP for monoclonal process   -peripheral blood flow cytometry for phenotypic abnormalities or abnormal cell population   -US abd for HSM   -viral testing hep and HIV      8/15/22- wbc overall stable, however her joint pain has worsened , she has been evaluated by rheumatology for her arthritis and positive BIANCA,she has started on HCQ with improvement of joint pains, wbc and ANC stable today     12/21/22- wbc and ANC generally stable, she is on HCQ for joint pain   4/28/23- the wbc and ANC generally are in same range around 1.1-1.4   8/19-23- ANC at 1.5 and wbc at 3.0, largely stable    11/29- slightly lower ANC, considering repeating BMBx        3. dizziness, fatigue   at this time we will refer to neurology for autonomic dysfunction evaluation   she additionally has A flutter and could potentially be contributing to symptoms      as above evaluated with neurology and now with cardiology, considering cardiac bx      RTC 3 m with labs    Steve Banegas MD

## 2023-12-14 ENCOUNTER — APPOINTMENT (OUTPATIENT)
Dept: HEMATOLOGY/ONCOLOGY | Facility: CLINIC | Age: 73
End: 2023-12-14
Payer: MEDICARE

## 2023-12-15 ENCOUNTER — TELEPHONE (OUTPATIENT)
Dept: PRIMARY CARE | Facility: CLINIC | Age: 73
End: 2023-12-15
Payer: MEDICARE

## 2023-12-18 ENCOUNTER — PHARMACY VISIT (OUTPATIENT)
Dept: PHARMACY | Facility: CLINIC | Age: 73
End: 2023-12-18

## 2023-12-18 ENCOUNTER — OFFICE VISIT (OUTPATIENT)
Dept: PRIMARY CARE | Facility: CLINIC | Age: 73
End: 2023-12-18
Payer: MEDICARE

## 2023-12-18 VITALS
BODY MASS INDEX: 28.64 KG/M2 | HEART RATE: 88 BPM | WEIGHT: 189 LBS | HEIGHT: 68 IN | SYSTOLIC BLOOD PRESSURE: 130 MMHG | DIASTOLIC BLOOD PRESSURE: 78 MMHG

## 2023-12-18 DIAGNOSIS — I10 PRIMARY HYPERTENSION: ICD-10-CM

## 2023-12-18 DIAGNOSIS — J98.01 COUGH DUE TO BRONCHOSPASM: Primary | ICD-10-CM

## 2023-12-18 PROCEDURE — 1160F RVW MEDS BY RX/DR IN RCRD: CPT | Performed by: FAMILY MEDICINE

## 2023-12-18 PROCEDURE — 3078F DIAST BP <80 MM HG: CPT | Performed by: FAMILY MEDICINE

## 2023-12-18 PROCEDURE — RXMED WILLOW AMBULATORY MEDICATION CHARGE

## 2023-12-18 PROCEDURE — 1159F MED LIST DOCD IN RCRD: CPT | Performed by: FAMILY MEDICINE

## 2023-12-18 PROCEDURE — 1126F AMNT PAIN NOTED NONE PRSNT: CPT | Performed by: FAMILY MEDICINE

## 2023-12-18 PROCEDURE — 1036F TOBACCO NON-USER: CPT | Performed by: FAMILY MEDICINE

## 2023-12-18 PROCEDURE — 99214 OFFICE O/P EST MOD 30 MIN: CPT | Performed by: FAMILY MEDICINE

## 2023-12-18 PROCEDURE — 3075F SYST BP GE 130 - 139MM HG: CPT | Performed by: FAMILY MEDICINE

## 2023-12-18 PROCEDURE — 94640 AIRWAY INHALATION TREATMENT: CPT | Performed by: FAMILY MEDICINE

## 2023-12-18 RX ORDER — ALBUTEROL SULFATE 0.83 MG/ML
2.5 SOLUTION RESPIRATORY (INHALATION) ONCE
Status: COMPLETED | OUTPATIENT
Start: 2023-12-18 | End: 2023-12-18

## 2023-12-18 RX ORDER — PREDNISONE 10 MG/1
TABLET ORAL
Qty: 30 TABLET | Refills: 0 | Status: SHIPPED | OUTPATIENT
Start: 2023-12-18 | End: 2024-03-05 | Stop reason: WASHOUT

## 2023-12-18 RX ORDER — DOXYCYCLINE 100 MG/1
100 CAPSULE ORAL 2 TIMES DAILY
Qty: 14 CAPSULE | Refills: 0 | Status: SHIPPED | OUTPATIENT
Start: 2023-12-18 | End: 2023-12-25

## 2023-12-18 RX ORDER — VALSARTAN AND HYDROCHLOROTHIAZIDE 160; 12.5 MG/1; MG/1
1 TABLET, FILM COATED ORAL DAILY
Qty: 90 TABLET | Refills: 1 | Status: SHIPPED | OUTPATIENT
Start: 2023-12-18 | End: 2024-06-05 | Stop reason: ALTCHOICE

## 2023-12-18 RX ORDER — ALBUTEROL SULFATE 90 UG/1
2 AEROSOL, METERED RESPIRATORY (INHALATION) EVERY 4 HOURS PRN
Qty: 6.7 G | Refills: 5 | Status: SHIPPED | OUTPATIENT
Start: 2023-12-18 | End: 2024-06-05 | Stop reason: ALTCHOICE

## 2023-12-18 RX ADMIN — ALBUTEROL SULFATE 2.5 MG: 0.83 SOLUTION RESPIRATORY (INHALATION) at 13:42

## 2023-12-18 ASSESSMENT — ENCOUNTER SYMPTOMS: COUGH: 1

## 2023-12-18 NOTE — PATIENT INSTRUCTIONS
Call concerns, side effects discussed.        Ways to Help Prevent Falls at Home    Quick Tips   ? Ask for help if you need it. Most people want to help!   ? Get up slowly after sitting or laying down   ? Wear a medical alert device or keep cell phone in your pocket   ? Use night lights, especially areas near a bathroom   ? Keep the items you use often within reach on a small stool or end table   ? Use an assistive device such as walker or cane, as directed by provider/physical therapy   ? Use a non-slip mat and grab bars in your bathroom. Look for home health sections for best options     Other Areas to Focus On   ? Exercise and nutrition: Regular exercise or taking a falls prevention class are great ways improve strength and balance. Don’t forget to stay hydrated and bring a snack!   ? Medicine side effects: Some medicines can make you sleepy or dizzy, which could cause a fall. Ask your healthcare provider about the side effects your medicines could cause. Be sure to let them know if you take any vitamins or supplements as well.   ? Tripping hazards: Remove items you could trip on, such as loose mats, rugs, cords, and clutter. Wear closed toe shoes with rubber soles.   ? Health and wellness: Get regular checkups with your healthcare provider, plus routine vision and hearing screenings. Talk with your healthcare provider about:   o Your medicines and the possible side effects - bring them in a bag if that is easier!   o Problems with balance or feeling dizzy   o Ways to promote bone health, such as Vitamin D and calcium supplements   o Questions or concerns about falling     *Ask your healthcare team if you have questions     [facility], 2022

## 2023-12-18 NOTE — PROGRESS NOTES
Subjective  Mirella Renae is a 73 y.o. female who presents for Cough.  Cough      Here with about a week of cough, worse at night, hard to sleep.  Granddaughter had something similar.   No n/v/d, does not feel short of breath.   Review of Systems   Respiratory:  Positive for cough.    All other systems reviewed and are negative.  .    Objective     Visit Vitals  /78 (BP Location: Left arm, Patient Position: Sitting)   Pulse 88      Physical Exam  Vitals reviewed.   Constitutional:       Comments: Barky , frequent cough   HENT:      Head: Normocephalic.      Mouth/Throat:      Mouth: Mucous membranes are moist.      Pharynx: No oropharyngeal exudate or posterior oropharyngeal erythema.   Cardiovascular:      Rate and Rhythm: Normal rate and regular rhythm.   Pulmonary:      Breath sounds: Wheezing present.      Comments: Cleared after first /only albuterol nebulizer treatment  Neurological:      Mental Status: She is alert.         Assessment/Plan   Problem List Items Addressed This Visit       Hypertension    Relevant Medications    valsartan-hydrochlorothiazide (Diovan-HCT) 160-12.5 mg tablet     Other Visit Diagnoses       Cough due to bronchospasm    -  Primary    Relevant Medications    predniSONE (Deltasone) 10 mg tablet    albuterol (Ventolin HFA) 90 mcg/actuation inhaler    doxycycline (Vibramycin) 100 mg capsule    albuterol 2.5 mg /3 mL (0.083 %) nebulizer solution 2.5 mg (Completed)    Other Relevant Orders    XR chest 2 views                   Mirtha Benjamin MD Patient was identified as a fall risk. Risk prevention instructions provided.

## 2023-12-21 ENCOUNTER — TELEPHONE (OUTPATIENT)
Dept: PRIMARY CARE | Facility: CLINIC | Age: 73
End: 2023-12-21
Payer: MEDICARE

## 2023-12-29 ENCOUNTER — HOSPITAL ENCOUNTER (OUTPATIENT)
Dept: RADIOLOGY | Facility: HOSPITAL | Age: 73
Discharge: HOME | End: 2023-12-29
Payer: MEDICARE

## 2023-12-29 DIAGNOSIS — J98.01 COUGH DUE TO BRONCHOSPASM: ICD-10-CM

## 2023-12-29 PROCEDURE — 71046 X-RAY EXAM CHEST 2 VIEWS: CPT | Performed by: RADIOLOGY

## 2023-12-29 PROCEDURE — 71046 X-RAY EXAM CHEST 2 VIEWS: CPT

## 2023-12-30 ENCOUNTER — TELEPHONE (OUTPATIENT)
Dept: PRIMARY CARE | Facility: CLINIC | Age: 73
End: 2023-12-30
Payer: MEDICARE

## 2024-01-22 ENCOUNTER — PHARMACY VISIT (OUTPATIENT)
Dept: PHARMACY | Facility: CLINIC | Age: 74
End: 2024-01-22
Payer: COMMERCIAL

## 2024-01-22 PROCEDURE — RXMED WILLOW AMBULATORY MEDICATION CHARGE

## 2024-02-07 ENCOUNTER — PHARMACY VISIT (OUTPATIENT)
Dept: PHARMACY | Facility: CLINIC | Age: 74
End: 2024-02-07
Payer: COMMERCIAL

## 2024-02-07 DIAGNOSIS — I48.0 PAROXYSMAL ATRIAL FIBRILLATION (MULTI): Primary | ICD-10-CM

## 2024-02-07 PROCEDURE — RXMED WILLOW AMBULATORY MEDICATION CHARGE

## 2024-02-21 NOTE — H&P (VIEW-ONLY)
Cardiology Subsequent Encounter Clinic Note  Name: Mirella Renae  MRN: [id number]  : 1950    CC: Afib    Active Issues:  Mirella Renae is a 73 y.o. female with a medical history of hypertension, anxiety, who was recently evaluated by hematology oncology for complaints of leukopenia. Was incidentally noted to be in atrial flutter with controlled rates.     Longstanding persistent, nonvalvular  -Echocardiogram performed 2022 shows preserved biventricular function. Event monitor at the same time showed no evidence of high-grade AV block.  Current medical therapy includes Lopressor 25 mg twice daily, Xarelto 20 mg daily     Also has a medical history of mixed connective tissue disorders and multiple myeloma.  (Smoldering, last kappa lambda ratio around 4).     Since her last visit she denies any chest discomfort or shortness of breath with exertion. Denies any orthopnea/PND/lower extremity edema. Continues to have significant dizziness particularly postural.  Blood pressure on clinic visits has been largely normotensive.  Endorses occasional episodes of nonexertional chest discomfort lasting for about a minute at a time.  However as noted above no exertional angina or shortness of breath.      Past Medical History  Past Medical History:   Diagnosis Date    Anxiety disorder, unspecified 2022    Anxiety disorder    Encounter for general adult medical examination without abnormal findings 2020    Medicare annual wellness visit, initial    Essential (primary) hypertension 2022    Hypertension       Past Surgical History  Past Surgical History:   Procedure Laterality Date    OTHER SURGICAL HISTORY  2020    Appendectomy    OTHER SURGICAL HISTORY  2020    Tubal ligation    OTHER SURGICAL HISTORY  2020    Tumor excision    OTHER SURGICAL HISTORY  2020    Colonoscopy       Medications  Current Outpatient Medications on File Prior to Visit   Medication Sig Dispense Refill  "   cyanocobalamin (Vitamin B-12) 1,000 mcg tablet Take 1 tablet (1,000 mcg) by mouth once daily.      hydroxychloroquine (Plaquenil) 200 mg tablet TAKE 1 TABLET BY MOUTH DAILY 90 tablet 1    metoprolol tartrate (Lopressor) 25 mg tablet TAKE 1 TABLET TWICE DAILY. 180 tablet 3    multivitamin tablet Multi-Vitamins TABS   Refills: 0       Active      rivaroxaban (Xarelto) 20 mg tablet TAKE ONE TABLET BY MOUTH DAILY 30 tablet 0    traMADol (Ultram) 50 mg tablet Take 1 tablet (50 mg) by mouth every 8 hours if needed.      valsartan-hydrochlorothiazide (Diovan-HCT) 160-12.5 mg tablet TAKE 1 TABLET BY MOUTH ONCE DAILY. 90 tablet 1    albuterol (Ventolin HFA) 90 mcg/actuation inhaler Inhale 2 puffs every 4 hours if needed for wheezing or shortness of breath. (Patient not taking: Reported on 2/22/2024) 6.7 g 5    predniSONE (Deltasone) 10 mg tablet 4 tabs daily for 3 days, 3 tabs daily for 3 days, 2 tabs daily for 3 days, 1 tab daily for 3 days, then discontinue (Patient not taking: Reported on 2/22/2024) 30 tablet 0     No current facility-administered medications on file prior to visit.       Allergies  No Known Allergies    Social History  Social History     Tobacco Use    Smoking status: Never    Smokeless tobacco: Never   Substance Use Topics    Alcohol use: Not Currently    Drug use: Never       Family History  Family History   Problem Relation Name Age of Onset    Coronary artery disease Mother      Hyperlipidemia Mother      Hypertension Mother      Colon cancer Mother      Heart attack Mother      Thyroid disease Mother      Thyroid cancer Son      Colon cancer Other         Physical Examination  Vitals: /70   Pulse 85   Ht 1.727 m (5' 8\")   Wt 85.3 kg (188 lb)   SpO2 99%   BMI 28.59 kg/m²   General: awake, alert and oriented. No acute distress.   Skin: Skin is warm, dry and intact without rashes or lesions. Appropriate color for ethnicity. Nail beds pink with no cyanosis or clubbing  HEENT: " normocephalic, atraumatic; conjunctivae are clear without exudates or hemorrhage. Sclera is non-icteric. Eyelids are normal in appearance without swelling or lesions. Hearing intact. Nares are patent bilaterally. Moist mucous membranes.   Cardiovascular: Regular. No murmurs, gallops, or rubs are auscultated. S1 and S2 are heard and are of normal intensity. No JVD, no carotid bruits  Respiratory: Thorax symmetric. CTAB, breath sounds vesicular. No crackles, wheezes or ronchi.   Gastrointestinal: soft, non-distended, BS + x 4  Genitourinary: exam deferred  Musculoskeletal: moves all extremities  Extremities: pulses palpable bilaterally; no swelling or erythema; no edema  Neurological: alert & oriented x 3; no focal deficits  Psychiatric: appropriate mood and affect       Labs/Imaging/Procedures    Lab Results   Component Value Date    HGB 12.2 11/21/2023    HGB 12.3 09/20/2023    HGB 12.5 08/11/2023    HGB 12.2 06/21/2023     11/21/2023    WBC 2.5 (L) 11/21/2023     11/21/2023    K 4.2 11/21/2023    CREATININE 1.00 11/21/2023    CREATININE 1.03 09/20/2023    CREATININE 1.04 08/11/2023    CREATININE 1.00 06/21/2023    BUN 25 (H) 11/21/2023    CALCIUM 9.1 11/21/2023     (H) 08/11/2023    TROPHS 5 08/11/2023    LDLF 71 09/20/2023     No echocardiogram results found for the past 12 months  XR chest 2 views  Narrative: Interpreted By:  Ramiro Castaneda,   STUDY:  XR CHEST 2 VIEWS      INDICATION:  Signs/Symptoms:cough.      COMPARISON:  May 29, 2011      ACCESSION NUMBER(S):  [id number]      ORDERING CLINICIAN:  MEENU AGUILAR      FINDINGS:  No consolidation, effusion, edema, or pneumothorax. Heart size mildly  enlarged but unchanged.      Impression: Mild cardiomegaly but no evidence of acute intrathoracic abnormality.      Signed by: Ramiro Castaneda 12/30/2023 8:19 AM  Dictation workstation:   [id number]        Impression  Mirella Renae is a 73 y.o. female  with a medical history of hypertension, anxiety,  who was recently evaluated by hematology oncology for complaints of leukopenia. Was incidentally noted to be in atrial flutter with controlled rates.     Longstanding persistent atrial fibrillation  -Echocardiogram performed September 2022 shows preserved biventricular function. Event monitor at the same time showed no evidence of high-grade AV block.  Current medical therapy includes Lopressor 25 mg twice daily, Xarelto 20 mg daily     The dizziness does not appear to be related to underlying heart block/bradycardia based on monitor results  Patient does have a preponderance of light chain on her blood work; no obvious evidence of amyloidosis on her echocardiogram; we cannot perform a cardiac MRI due to history of claustrophobia.  Have discussed this at length with the patient.  In light of the preponderance of light chain disease, and longstanding persistent atrial fibrillation with signs of autonomic dysfunction with the dizziness -the index of suspicion for underlying amyloidosis is moderate to high.  We discussed an endomyocardial biopsy and the patient is agreeable at this time.  Although she does not have LVH the pattern of voltages on her EKG is also suggestive of amyloid.  Will schedule her for the same    RTC after biopsy      Humberto Caicedo MD  Advanced Heart Failure/Transplant Cardiology  Cardio-Oncology  [facility] [place]

## 2024-02-21 NOTE — PROGRESS NOTES
Cardiology Subsequent Encounter Clinic Note  Name: Mirella Renae  MRN: [id number]  : 1950    CC: Afib    Active Issues:  Mirella Renae is a 73 y.o. female with a medical history of hypertension, anxiety, who was recently evaluated by hematology oncology for complaints of leukopenia. Was incidentally noted to be in atrial flutter with controlled rates.     Longstanding persistent, nonvalvular  -Echocardiogram performed 2022 shows preserved biventricular function. Event monitor at the same time showed no evidence of high-grade AV block.  Current medical therapy includes Lopressor 25 mg twice daily, Xarelto 20 mg daily     Also has a medical history of mixed connective tissue disorders and multiple myeloma.  (Smoldering, last kappa lambda ratio around 4).     Since her last visit she denies any chest discomfort or shortness of breath with exertion. Denies any orthopnea/PND/lower extremity edema. Continues to have significant dizziness particularly postural.  Blood pressure on clinic visits has been largely normotensive.  Endorses occasional episodes of nonexertional chest discomfort lasting for about a minute at a time.  However as noted above no exertional angina or shortness of breath.      Past Medical History  Past Medical History:   Diagnosis Date    Anxiety disorder, unspecified 2022    Anxiety disorder    Encounter for general adult medical examination without abnormal findings 2020    Medicare annual wellness visit, initial    Essential (primary) hypertension 2022    Hypertension       Past Surgical History  Past Surgical History:   Procedure Laterality Date    OTHER SURGICAL HISTORY  2020    Appendectomy    OTHER SURGICAL HISTORY  2020    Tubal ligation    OTHER SURGICAL HISTORY  2020    Tumor excision    OTHER SURGICAL HISTORY  2020    Colonoscopy       Medications  Current Outpatient Medications on File Prior to Visit   Medication Sig Dispense Refill  "   cyanocobalamin (Vitamin B-12) 1,000 mcg tablet Take 1 tablet (1,000 mcg) by mouth once daily.      hydroxychloroquine (Plaquenil) 200 mg tablet TAKE 1 TABLET BY MOUTH DAILY 90 tablet 1    metoprolol tartrate (Lopressor) 25 mg tablet TAKE 1 TABLET TWICE DAILY. 180 tablet 3    multivitamin tablet Multi-Vitamins TABS   Refills: 0       Active      rivaroxaban (Xarelto) 20 mg tablet TAKE ONE TABLET BY MOUTH DAILY 30 tablet 0    traMADol (Ultram) 50 mg tablet Take 1 tablet (50 mg) by mouth every 8 hours if needed.      valsartan-hydrochlorothiazide (Diovan-HCT) 160-12.5 mg tablet TAKE 1 TABLET BY MOUTH ONCE DAILY. 90 tablet 1    albuterol (Ventolin HFA) 90 mcg/actuation inhaler Inhale 2 puffs every 4 hours if needed for wheezing or shortness of breath. (Patient not taking: Reported on 2/22/2024) 6.7 g 5    predniSONE (Deltasone) 10 mg tablet 4 tabs daily for 3 days, 3 tabs daily for 3 days, 2 tabs daily for 3 days, 1 tab daily for 3 days, then discontinue (Patient not taking: Reported on 2/22/2024) 30 tablet 0     No current facility-administered medications on file prior to visit.       Allergies  No Known Allergies    Social History  Social History     Tobacco Use    Smoking status: Never    Smokeless tobacco: Never   Substance Use Topics    Alcohol use: Not Currently    Drug use: Never       Family History  Family History   Problem Relation Name Age of Onset    Coronary artery disease Mother      Hyperlipidemia Mother      Hypertension Mother      Colon cancer Mother      Heart attack Mother      Thyroid disease Mother      Thyroid cancer Son      Colon cancer Other         Physical Examination  Vitals: /70   Pulse 85   Ht 1.727 m (5' 8\")   Wt 85.3 kg (188 lb)   SpO2 99%   BMI 28.59 kg/m²   General: awake, alert and oriented. No acute distress.   Skin: Skin is warm, dry and intact without rashes or lesions. Appropriate color for ethnicity. Nail beds pink with no cyanosis or clubbing  HEENT: " normocephalic, atraumatic; conjunctivae are clear without exudates or hemorrhage. Sclera is non-icteric. Eyelids are normal in appearance without swelling or lesions. Hearing intact. Nares are patent bilaterally. Moist mucous membranes.   Cardiovascular: Regular. No murmurs, gallops, or rubs are auscultated. S1 and S2 are heard and are of normal intensity. No JVD, no carotid bruits  Respiratory: Thorax symmetric. CTAB, breath sounds vesicular. No crackles, wheezes or ronchi.   Gastrointestinal: soft, non-distended, BS + x 4  Genitourinary: exam deferred  Musculoskeletal: moves all extremities  Extremities: pulses palpable bilaterally; no swelling or erythema; no edema  Neurological: alert & oriented x 3; no focal deficits  Psychiatric: appropriate mood and affect       Labs/Imaging/Procedures    Lab Results   Component Value Date    HGB 12.2 11/21/2023    HGB 12.3 09/20/2023    HGB 12.5 08/11/2023    HGB 12.2 06/21/2023     11/21/2023    WBC 2.5 (L) 11/21/2023     11/21/2023    K 4.2 11/21/2023    CREATININE 1.00 11/21/2023    CREATININE 1.03 09/20/2023    CREATININE 1.04 08/11/2023    CREATININE 1.00 06/21/2023    BUN 25 (H) 11/21/2023    CALCIUM 9.1 11/21/2023     (H) 08/11/2023    TROPHS 5 08/11/2023    LDLF 71 09/20/2023     No echocardiogram results found for the past 12 months  XR chest 2 views  Narrative: Interpreted By:  Ramiro Castaneda,   STUDY:  XR CHEST 2 VIEWS      INDICATION:  Signs/Symptoms:cough.      COMPARISON:  May 29, 2011      ACCESSION NUMBER(S):  [id number]      ORDERING CLINICIAN:  MEENU AGUILAR      FINDINGS:  No consolidation, effusion, edema, or pneumothorax. Heart size mildly  enlarged but unchanged.      Impression: Mild cardiomegaly but no evidence of acute intrathoracic abnormality.      Signed by: Ramiro Castaneda 12/30/2023 8:19 AM  Dictation workstation:   [id number]        Impression  Mirella Renae is a 73 y.o. female  with a medical history of hypertension, anxiety,  who was recently evaluated by hematology oncology for complaints of leukopenia. Was incidentally noted to be in atrial flutter with controlled rates.     Longstanding persistent atrial fibrillation  -Echocardiogram performed September 2022 shows preserved biventricular function. Event monitor at the same time showed no evidence of high-grade AV block.  Current medical therapy includes Lopressor 25 mg twice daily, Xarelto 20 mg daily     The dizziness does not appear to be related to underlying heart block/bradycardia based on monitor results  Patient does have a preponderance of light chain on her blood work; no obvious evidence of amyloidosis on her echocardiogram; we cannot perform a cardiac MRI due to history of claustrophobia.  Have discussed this at length with the patient.  In light of the preponderance of light chain disease, and longstanding persistent atrial fibrillation with signs of autonomic dysfunction with the dizziness -the index of suspicion for underlying amyloidosis is moderate to high.  We discussed an endomyocardial biopsy and the patient is agreeable at this time.  Although she does not have LVH the pattern of voltages on her EKG is also suggestive of amyloid.  Will schedule her for the same    RTC after biopsy      Humberto Caicedo MD  Advanced Heart Failure/Transplant Cardiology  Cardio-Oncology  [facility] [place]

## 2024-02-22 ENCOUNTER — OFFICE VISIT (OUTPATIENT)
Dept: CARDIOLOGY | Facility: CLINIC | Age: 74
End: 2024-02-22
Payer: MEDICARE

## 2024-02-22 ENCOUNTER — LAB (OUTPATIENT)
Dept: LAB | Facility: LAB | Age: 74
End: 2024-02-22
Payer: MEDICARE

## 2024-02-22 ENCOUNTER — TELEPHONE (OUTPATIENT)
Dept: CARDIOLOGY | Facility: HOSPITAL | Age: 74
End: 2024-02-22

## 2024-02-22 VITALS
DIASTOLIC BLOOD PRESSURE: 70 MMHG | WEIGHT: 188 LBS | BODY MASS INDEX: 28.49 KG/M2 | OXYGEN SATURATION: 99 % | HEIGHT: 68 IN | HEART RATE: 85 BPM | SYSTOLIC BLOOD PRESSURE: 118 MMHG

## 2024-02-22 DIAGNOSIS — C90.00 MULTIPLE MYELOMA NOT HAVING ACHIEVED REMISSION (MULTI): Primary | ICD-10-CM

## 2024-02-22 DIAGNOSIS — C90.00 MULTIPLE MYELOMA NOT HAVING ACHIEVED REMISSION (MULTI): ICD-10-CM

## 2024-02-22 DIAGNOSIS — I48.0 PAROXYSMAL ATRIAL FIBRILLATION (MULTI): ICD-10-CM

## 2024-02-22 DIAGNOSIS — I42.9 CARDIOMYOPATHY, UNSPECIFIED TYPE (MULTI): ICD-10-CM

## 2024-02-22 DIAGNOSIS — I42.5 OTHER RESTRICTIVE CARDIOMYOPATHY (MULTI): ICD-10-CM

## 2024-02-22 LAB
ALBUMIN SERPL BCP-MCNC: 4 G/DL (ref 3.4–5)
ALP SERPL-CCNC: 76 U/L (ref 33–136)
ALT SERPL W P-5'-P-CCNC: 8 U/L (ref 7–45)
ANION GAP SERPL CALC-SCNC: 11 MMOL/L (ref 10–20)
AST SERPL W P-5'-P-CCNC: 13 U/L (ref 9–39)
B2 MICROGLOB SERPL-MCNC: 4.2 MG/L (ref 0.7–2.2)
BASOPHILS # BLD AUTO: 0.03 X10*3/UL (ref 0–0.1)
BASOPHILS NFR BLD AUTO: 0.9 %
BILIRUB SERPL-MCNC: 0.7 MG/DL (ref 0–1.2)
BNP SERPL-MCNC: 317 PG/ML (ref 0–99)
BUN SERPL-MCNC: 30 MG/DL (ref 6–23)
CALCIUM SERPL-MCNC: 8.9 MG/DL (ref 8.6–10.3)
CARDIAC TROPONIN I PNL SERPL HS: 5 NG/L (ref 0–13)
CHLORIDE SERPL-SCNC: 106 MMOL/L (ref 98–107)
CO2 SERPL-SCNC: 26 MMOL/L (ref 21–32)
CREAT SERPL-MCNC: 0.97 MG/DL (ref 0.5–1.05)
EGFRCR SERPLBLD CKD-EPI 2021: 62 ML/MIN/1.73M*2
EOSINOPHIL # BLD AUTO: 0.08 X10*3/UL (ref 0–0.4)
EOSINOPHIL NFR BLD AUTO: 2.4 %
ERYTHROCYTE [DISTWIDTH] IN BLOOD BY AUTOMATED COUNT: 12.6 % (ref 11.5–14.5)
GLUCOSE SERPL-MCNC: 90 MG/DL (ref 74–99)
HCT VFR BLD AUTO: 37.2 % (ref 36–46)
HGB BLD-MCNC: 12.1 G/DL (ref 12–16)
IGA SERPL-MCNC: 55 MG/DL (ref 70–400)
IGG SERPL-MCNC: 2360 MG/DL (ref 700–1600)
IGM SERPL-MCNC: 58 MG/DL (ref 40–230)
IMM GRANULOCYTES # BLD AUTO: 0.01 X10*3/UL (ref 0–0.5)
IMM GRANULOCYTES NFR BLD AUTO: 0.3 % (ref 0–0.9)
LYMPHOCYTES # BLD AUTO: 1.02 X10*3/UL (ref 0.8–3)
LYMPHOCYTES NFR BLD AUTO: 30 %
MCH RBC QN AUTO: 31 PG (ref 26–34)
MCHC RBC AUTO-ENTMCNC: 32.5 G/DL (ref 32–36)
MCV RBC AUTO: 95 FL (ref 80–100)
MONOCYTES # BLD AUTO: 0.47 X10*3/UL (ref 0.05–0.8)
MONOCYTES NFR BLD AUTO: 13.8 %
NEUTROPHILS # BLD AUTO: 1.79 X10*3/UL (ref 1.6–5.5)
NEUTROPHILS NFR BLD AUTO: 52.6 %
NRBC BLD-RTO: 0 /100 WBCS (ref 0–0)
PLATELET # BLD AUTO: 197 X10*3/UL (ref 150–450)
POTASSIUM SERPL-SCNC: 3.9 MMOL/L (ref 3.5–5.3)
PROT SERPL-MCNC: 7.6 G/DL (ref 6.4–8.2)
PROT SERPL-MCNC: 7.7 G/DL (ref 6.4–8.2)
RBC # BLD AUTO: 3.9 X10*6/UL (ref 4–5.2)
SODIUM SERPL-SCNC: 139 MMOL/L (ref 136–145)
WBC # BLD AUTO: 3.4 X10*3/UL (ref 4.4–11.3)

## 2024-02-22 PROCEDURE — 86320 SERUM IMMUNOELECTROPHORESIS: CPT | Performed by: INTERNAL MEDICINE

## 2024-02-22 PROCEDURE — 82784 ASSAY IGA/IGD/IGG/IGM EACH: CPT

## 2024-02-22 PROCEDURE — 83521 IG LIGHT CHAINS FREE EACH: CPT

## 2024-02-22 PROCEDURE — 86334 IMMUNOFIX E-PHORESIS SERUM: CPT

## 2024-02-22 PROCEDURE — 82232 ASSAY OF BETA-2 PROTEIN: CPT

## 2024-02-22 PROCEDURE — 83880 ASSAY OF NATRIURETIC PEPTIDE: CPT

## 2024-02-22 PROCEDURE — 1126F AMNT PAIN NOTED NONE PRSNT: CPT | Performed by: STUDENT IN AN ORGANIZED HEALTH CARE EDUCATION/TRAINING PROGRAM

## 2024-02-22 PROCEDURE — 84165 PROTEIN E-PHORESIS SERUM: CPT

## 2024-02-22 PROCEDURE — 3078F DIAST BP <80 MM HG: CPT | Performed by: STUDENT IN AN ORGANIZED HEALTH CARE EDUCATION/TRAINING PROGRAM

## 2024-02-22 PROCEDURE — 80053 COMPREHEN METABOLIC PANEL: CPT

## 2024-02-22 PROCEDURE — 84155 ASSAY OF PROTEIN SERUM: CPT

## 2024-02-22 PROCEDURE — 84484 ASSAY OF TROPONIN QUANT: CPT

## 2024-02-22 PROCEDURE — 1036F TOBACCO NON-USER: CPT | Performed by: STUDENT IN AN ORGANIZED HEALTH CARE EDUCATION/TRAINING PROGRAM

## 2024-02-22 PROCEDURE — 85025 COMPLETE CBC W/AUTO DIFF WBC: CPT

## 2024-02-22 PROCEDURE — 93000 ELECTROCARDIOGRAM COMPLETE: CPT | Performed by: STUDENT IN AN ORGANIZED HEALTH CARE EDUCATION/TRAINING PROGRAM

## 2024-02-22 PROCEDURE — 3074F SYST BP LT 130 MM HG: CPT | Performed by: STUDENT IN AN ORGANIZED HEALTH CARE EDUCATION/TRAINING PROGRAM

## 2024-02-22 PROCEDURE — 84165 PROTEIN E-PHORESIS SERUM: CPT | Performed by: INTERNAL MEDICINE

## 2024-02-22 PROCEDURE — 99215 OFFICE O/P EST HI 40 MIN: CPT | Performed by: STUDENT IN AN ORGANIZED HEALTH CARE EDUCATION/TRAINING PROGRAM

## 2024-02-22 PROCEDURE — 1159F MED LIST DOCD IN RCRD: CPT | Performed by: STUDENT IN AN ORGANIZED HEALTH CARE EDUCATION/TRAINING PROGRAM

## 2024-02-22 RX ORDER — LANOLIN ALCOHOL/MO/W.PET/CERES
1000 CREAM (GRAM) TOPICAL DAILY
COMMUNITY

## 2024-02-22 NOTE — TELEPHONE ENCOUNTER
Pt is scheduled for RHC/Myocardial Bx on 3/6 @ [facility] with Dr. Calixto. [facility] will call pt with the time. Pt to be NPO after midnight.  Hold Xarelto for 2 days prior but can take rest of AM meds am of procedure with small sips of water.  Pt had labs completed.  FU will need to be made.

## 2024-02-23 ENCOUNTER — HOSPITAL ENCOUNTER (OUTPATIENT)
Dept: RADIOLOGY | Facility: HOSPITAL | Age: 74
Discharge: HOME | End: 2024-02-23
Payer: MEDICARE

## 2024-02-23 DIAGNOSIS — C90.00 MULTIPLE MYELOMA NOT HAVING ACHIEVED REMISSION (MULTI): ICD-10-CM

## 2024-02-23 LAB
KAPPA LC SERPL-MCNC: 3.92 MG/DL (ref 0.33–1.94)
KAPPA LC/LAMBDA SER: 3.27 {RATIO} (ref 0.26–1.65)
LAMBDA LC SERPL-MCNC: 1.2 MG/DL (ref 0.57–2.63)

## 2024-02-23 PROCEDURE — 3430000001 HC RX 343 DIAGNOSTIC RADIOPHARMACEUTICALS: Mod: MUE | Performed by: PHYSICIAN ASSISTANT

## 2024-02-23 PROCEDURE — 78816 PET IMAGE W/CT FULL BODY: CPT | Mod: PET TUMOR SUBSQ TX STRATEGY | Performed by: RADIOLOGY

## 2024-02-23 PROCEDURE — 78816 PET IMAGE W/CT FULL BODY: CPT | Mod: PS

## 2024-02-23 PROCEDURE — A9552 F18 FDG: HCPCS | Mod: MUE | Performed by: PHYSICIAN ASSISTANT

## 2024-02-23 RX ORDER — FLUDEOXYGLUCOSE F 18 200 MCI/ML
11 INJECTION, SOLUTION INTRAVENOUS
Status: COMPLETED | OUTPATIENT
Start: 2024-02-23 | End: 2024-02-23

## 2024-02-23 RX ADMIN — FLUDEOXYGLUCOSE F 18 11 MILLICURIE: 200 INJECTION, SOLUTION INTRAVENOUS at 08:22

## 2024-02-28 LAB
ALBUMIN: 4.1 G/DL (ref 3.4–5)
ALPHA 1 GLOBULIN: 0.3 G/DL (ref 0.2–0.6)
ALPHA 2 GLOBULIN: 0.6 G/DL (ref 0.4–1.1)
BETA GLOBULIN: 0.6 G/DL (ref 0.5–1.2)
GAMMA GLOBULIN: 2.1 G/DL (ref 0.5–1.4)
IMMUNOFIXATION COMMENT: ABNORMAL
M-PROTEIN 1: 1.3 G/DL
PATH REVIEW - SERUM IMMUNOFIXATION: ABNORMAL
PATH REVIEW-SERUM PROTEIN ELECTROPHORESIS: ABNORMAL
PROTEIN ELECTROPHORESIS COMMENT: ABNORMAL

## 2024-03-01 ENCOUNTER — OFFICE VISIT (OUTPATIENT)
Dept: HEMATOLOGY/ONCOLOGY | Facility: CLINIC | Age: 74
End: 2024-03-01
Payer: MEDICARE

## 2024-03-01 DIAGNOSIS — I48.0 PAROXYSMAL ATRIAL FIBRILLATION (MULTI): ICD-10-CM

## 2024-03-01 DIAGNOSIS — E04.2 MULTIPLE THYROID NODULES: ICD-10-CM

## 2024-03-01 DIAGNOSIS — D70.9 NEUTROPENIA, UNSPECIFIED TYPE (CMS-HCC): ICD-10-CM

## 2024-03-01 DIAGNOSIS — C90.00 MULTIPLE MYELOMA NOT HAVING ACHIEVED REMISSION (MULTI): Primary | ICD-10-CM

## 2024-03-01 PROCEDURE — RXMED WILLOW AMBULATORY MEDICATION CHARGE

## 2024-03-01 PROCEDURE — 99214 OFFICE O/P EST MOD 30 MIN: CPT | Performed by: INTERNAL MEDICINE

## 2024-03-01 PROCEDURE — 3077F SYST BP >= 140 MM HG: CPT | Performed by: INTERNAL MEDICINE

## 2024-03-01 PROCEDURE — 1160F RVW MEDS BY RX/DR IN RCRD: CPT | Performed by: INTERNAL MEDICINE

## 2024-03-01 PROCEDURE — 1159F MED LIST DOCD IN RCRD: CPT | Performed by: INTERNAL MEDICINE

## 2024-03-01 PROCEDURE — 1036F TOBACCO NON-USER: CPT | Performed by: INTERNAL MEDICINE

## 2024-03-01 PROCEDURE — 1126F AMNT PAIN NOTED NONE PRSNT: CPT | Performed by: INTERNAL MEDICINE

## 2024-03-01 PROCEDURE — 3079F DIAST BP 80-89 MM HG: CPT | Performed by: INTERNAL MEDICINE

## 2024-03-01 ASSESSMENT — COLUMBIA-SUICIDE SEVERITY RATING SCALE - C-SSRS
1. IN THE PAST MONTH, HAVE YOU WISHED YOU WERE DEAD OR WISHED YOU COULD GO TO SLEEP AND NOT WAKE UP?: NO
6. HAVE YOU EVER DONE ANYTHING, STARTED TO DO ANYTHING, OR PREPARED TO DO ANYTHING TO END YOUR LIFE?: NO
2. HAVE YOU ACTUALLY HAD ANY THOUGHTS OF KILLING YOURSELF?: NO

## 2024-03-01 ASSESSMENT — PAIN SCALES - GENERAL: PAINLEVEL: 0-NO PAIN

## 2024-03-01 NOTE — PATIENT INSTRUCTIONS
Today you met with your hematologist/oncologist.  Recent labs were discussed and questions answered.  Scheduling orders were placed.  While we appreciate that you verbalized understanding, if any questions arise after leaving, please do not hesitate to call the office to discuss.  [phone] Jian Lizama

## 2024-03-01 NOTE — PROGRESS NOTES
Patient ID: Candace Elmore is a 73 y.o. female.  Referring Physician: Steve Banegas MD  [id number] [id number]  Primary Care Provider: Mirtha Benjamin MD      Subjective    HPI  ID Statement:    CANDACE ELMORE is a 72 year old Female        Chief Complaint: Evaluation for leukopenia   Interval History:    Referred by Dr. Mirtha Benjamin      Reason for referral: Leukopenia      HPI   70 year old woman with hx of HTN, anxiety, who is referred for leukopenia, her labs on 6/30/ 2021 and on 8/18/21 showed wbc of 2.4K and  2.6K respectively, ANC was 1.20 and 1.12 respectively, there were 1% atypical lymphs on the first differential. Hg was normal at 12.1 g/dl and platelets normal at 176. B12 level was 288.      she is feeling well in general   she had a thumb infection and cellulitis after he cat bit her, she received PO antibiotics and it resolves  no fever, no UTI, no hx of pneumonia or other infections   no bleeding issues, no hematochezia or melena   she has good level of energy  and is still working part time    she has some arthritic pain in hip and lower back, no stiffness or swelling in the hand or feet joints   no abdominal , nausea or vomiting, no weight loss   no change in bowel habits   she just had a mmg that negative and is awaiting to have a colonoscopy   no headaches, mild intermittent short episodes of dizziness   no weakness or numbness   lost some weight last year but gained back      had her colonoscopy done march 2022 which was unremarkable      interval history - 3/1/24-     She had a URI infection and sinus symptoms in Dec and she took antibiotics and steroids     she continues to have her dizziness episodes   Variable in intensity and frequency without a clear pattern   It happens at random pattern but mostly when she is active or walking   No syncope episodes   She has seen cardiology recently and agreed to doing cardiac biopsy   Otherwise she tries to be  "active  Denies nausea or vomiting  Denies abdominal pain     PAST MEDICAL HISTORY:   HTN, anxiety  muscle tumor removal   appendectomy   tubal ligation      takes a mvi from [facility]      SOCIAL HISTORY:   works as a consultant and    no cigarette smoking  very occasional alcohol   has 2 children      FAMILY HISTORY:    colon cancer runs in family, her mother and grandmother had it, grandmother was in her 40s   No other specific history of bleeding, clotting or malignant disorder in the family.     REVIEW OF SYSTEMS:  Pertinent finding as per the history above.  There are no additional specific symptoms pertaining to eyes, ENT, hematologic, lymphatic, neurological, psychiatric, cardiac,  pulmonary, GI, , endocrine, rheumatic, dermatological, or musculoskeletal systems.  All other systems have been reviewed and generally negative and noncontributory.     PHYSICAL EXAMINATION:    GENERAL:  Age-appropriate, in no acute discomfort.     HEENT:  Normocephalic and atraumatic.   NECK:  Supple   Heart: Regular, no murmur  CHEST:  breathing non labored, clear to auscultation  EXTREMITIES:  No cyanosis, clubbing, or edema.  NEUROLOGICAL:  Alert, awake, and oriented     LAB DATA:  Latest labs were reviewed in the EMR and from the outside sources.      Objective   BSA: 1.98 meters squared  /84 (BP Location: Left arm, Patient Position: Sitting, BP Cuff Size: Large adult)   Pulse 81   Temp 36.4 °C (97.5 °F) (Temporal)   Resp 16   Ht 1.673 m (5' 5.87\")   Wt 84.6 kg (186 lb 8.2 oz)   SpO2 100%   BMI 30.23 kg/m²     Wt Readings from Last 3 Encounters:   03/01/24 84.6 kg (186 lb 8.2 oz)   02/22/24 85.3 kg (188 lb)   12/18/23 85.7 kg (189 lb)   ]    Family History   Problem Relation Name Age of Onset    Coronary artery disease Mother      Hyperlipidemia Mother      Hypertension Mother      Colon cancer Mother      Heart attack Mother      Thyroid disease Mother      Thyroid cancer Son      Colon cancer Other   "     Oncology History    No history exists.       Mirella Renae  reports that she has never smoked. She has never used smokeless tobacco.  She  reports that she does not currently use alcohol.  She  reports no history of drug use.    Physical Exam    Performance Status:  Symptomatic; fully ambulatory    Assessment/Plan      1. Smoldering multiple myeloma   her evaluation led to the diagnosis of smoldering multiple myeloma   bone marrow biopsy showed 10-15% plasma cells   no evidence of end organ damage , PET scan without any bone lesions   low risk SMM based on current M spike , FLC and PC percentage   plan for surveillance , the repeat labs demonstrate overall stability of her counts as well as of the paraprotein levels      8/15/22- the repeat SPEP, FLC and IG demonstrate stability, serum IG slightly improved, the M protein has been stable at 1.5-1.7 g/dl and at this time we will not pursue additional testing or therapies   however she has lower back pain and we will investigate with an MRI of the lumbar spine to rule out myelomatous involvement   will also check a dexa scan and vit D levels with next labs and consider anti resorptive therapy depending on results      12/21/22- she had repeat labs showing stability of the M spike, SPEP, FLC and the serum IG   at this time no emerging end organ damage, will plan to continue surveillance , monitor the kidney function, cr fluctuates slightly      4/28/23- she has noticed increasing dizziness episodes   additionally secondary to her arrythmia, cardiology suggested doing a cardiac MRI but she cannot secondary to marked claustrophobia and is not excited for a cardiac biopsy      I thinks she needs neurologic evaluation for autonomic dysfunction   additionally will request pathology to run an amyloid stain on the prior bone marrow biopsy   Cr slightly higher, obtain 24 hrs urine collection for protein and UPEP   add BNP and troponin to next labs      if all the above  testing is negative, her counts and serum proteins are otherwise stable and will continue surveillance   if testing is suggestive of AL amyloidosis then will warrant therapy      8/19- still suffering from likely symptoms of autonomic dysfunction   also cardiology recommending the investigation for cardiac amyloid with a bx, she is thinking about it   her laboratory parameters have not changed, the amyloid stain on BMBx was negative   will hold off any treatment , unless proven to have amyloidosis as there are no other organ dysfunction     11/29- myeloma labs slightly higher, and ANC lower   We discussed to repeat PET scan to which she agreed and to repeat marrow exam which we agreed to defer until next lab draw to establish trend   She is considering the endomyocardial biopsy, will repeat BNP as well     3/1/2024-the repeat PET scan showed heterogeneous marrow which is suggestive of myelomatous involvement however there is no discrete lytic lesions B  NP is a still elevated, she had met with cardiology and she is planned for endomyocardial biopsy scheduled next week  We will also plan for a repeat bone marrow biopsy but deferred now until she goes through her planned cardiac biopsy     2. Leukopenia with mild neutropenia   differential diagnosis and work up discussed with patient   plan to obtain the following testing   -nutritional deficiencies: check folic acid and start PO b12 for borderline level   -SPEP for monoclonal process   -peripheral blood flow cytometry for phenotypic abnormalities or abnormal cell population   -US abd for HSM   -viral testing hep and HIV      8/15/22- wbc overall stable, however her joint pain has worsened , she has been evaluated by rheumatology for her arthritis and positive BIANCA,she has started on HCQ with improvement of joint pains, wbc and ANC stable today     12/21/22- wbc and ANC generally stable, she is on HCQ for joint pain   4/28/23- the wbc and ANC generally are in same  range around 1.1-1.4   8/19-23- ANC at 1.5 and wbc at 3.0, largely stable    11/29- slightly lower ANC, considering repeating BMBx  3/1/2024-ANC and WBC are stable     3. dizziness, fatigue   at this time we will refer to neurology for autonomic dysfunction evaluation   she additionally has A flutter and could potentially be contributing to symptoms      as above evaluated with neurology and now with cardiology, considering cardiac bx      3/1/2024-as above planned for endomyocardial biopsy    4.  Abnormal thyroid uptake on the PET scan  Will order ultrasound    RTC 3 m with labs    Steve Banegas MD

## 2024-03-02 ENCOUNTER — PHARMACY VISIT (OUTPATIENT)
Dept: PHARMACY | Facility: CLINIC | Age: 74
End: 2024-03-02
Payer: COMMERCIAL

## 2024-03-02 PROCEDURE — RXMED WILLOW AMBULATORY MEDICATION CHARGE

## 2024-03-04 VITALS
HEIGHT: 66 IN | HEART RATE: 81 BPM | DIASTOLIC BLOOD PRESSURE: 84 MMHG | OXYGEN SATURATION: 100 % | SYSTOLIC BLOOD PRESSURE: 144 MMHG | RESPIRATION RATE: 16 BRPM | BODY MASS INDEX: 29.97 KG/M2 | WEIGHT: 186.51 LBS | TEMPERATURE: 97.5 F

## 2024-03-06 ENCOUNTER — HOSPITAL ENCOUNTER (OUTPATIENT)
Facility: HOSPITAL | Age: 74
Setting detail: OUTPATIENT SURGERY
Discharge: HOME | End: 2024-03-06
Attending: INTERNAL MEDICINE | Admitting: INTERNAL MEDICINE
Payer: MEDICARE

## 2024-03-06 VITALS
OXYGEN SATURATION: 99 % | SYSTOLIC BLOOD PRESSURE: 172 MMHG | RESPIRATION RATE: 12 BRPM | HEART RATE: 100 BPM | DIASTOLIC BLOOD PRESSURE: 91 MMHG

## 2024-03-06 DIAGNOSIS — I48.0 PAROXYSMAL ATRIAL FIBRILLATION (MULTI): ICD-10-CM

## 2024-03-06 DIAGNOSIS — I42.5 OTHER RESTRICTIVE CARDIOMYOPATHY (MULTI): ICD-10-CM

## 2024-03-06 DIAGNOSIS — C90.00 MULTIPLE MYELOMA NOT HAVING ACHIEVED REMISSION (MULTI): ICD-10-CM

## 2024-03-06 PROCEDURE — 93451 RIGHT HEART CATH: CPT | Performed by: INTERNAL MEDICINE

## 2024-03-06 PROCEDURE — 99152 MOD SED SAME PHYS/QHP 5/>YRS: CPT | Performed by: INTERNAL MEDICINE

## 2024-03-06 PROCEDURE — 7100000009 HC PHASE TWO TIME - INITIAL BASE CHARGE: Performed by: INTERNAL MEDICINE

## 2024-03-06 PROCEDURE — 88307 TISSUE EXAM BY PATHOLOGIST: CPT | Mod: TC,SUR | Performed by: STUDENT IN AN ORGANIZED HEALTH CARE EDUCATION/TRAINING PROGRAM

## 2024-03-06 PROCEDURE — C1727 CATH, BAL TIS DIS, NON-VAS: HCPCS | Performed by: INTERNAL MEDICINE

## 2024-03-06 PROCEDURE — 99153 MOD SED SAME PHYS/QHP EA: CPT | Performed by: INTERNAL MEDICINE

## 2024-03-06 PROCEDURE — 93505 ENDOMYOCARDIAL BIOPSY: CPT | Performed by: INTERNAL MEDICINE

## 2024-03-06 PROCEDURE — C1894 INTRO/SHEATH, NON-LASER: HCPCS | Performed by: INTERNAL MEDICINE

## 2024-03-06 PROCEDURE — 2500000005 HC RX 250 GENERAL PHARMACY W/O HCPCS: Performed by: INTERNAL MEDICINE

## 2024-03-06 PROCEDURE — 7100000010 HC PHASE TWO TIME - EACH INCREMENTAL 1 MINUTE: Performed by: INTERNAL MEDICINE

## 2024-03-06 PROCEDURE — 88307 TISSUE EXAM BY PATHOLOGIST: CPT | Performed by: PATHOLOGY

## 2024-03-06 PROCEDURE — 88313 SPECIAL STAINS GROUP 2: CPT | Performed by: PATHOLOGY

## 2024-03-06 PROCEDURE — 2500000004 HC RX 250 GENERAL PHARMACY W/ HCPCS (ALT 636 FOR OP/ED): Performed by: INTERNAL MEDICINE

## 2024-03-06 PROCEDURE — 2720000007 HC OR 272 NO HCPCS: Performed by: INTERNAL MEDICINE

## 2024-03-06 RX ORDER — MIDAZOLAM HYDROCHLORIDE 1 MG/ML
INJECTION INTRAMUSCULAR; INTRAVENOUS AS NEEDED
Status: DISCONTINUED | OUTPATIENT
Start: 2024-03-06 | End: 2024-03-06 | Stop reason: HOSPADM

## 2024-03-06 RX ORDER — FENTANYL CITRATE 50 UG/ML
INJECTION, SOLUTION INTRAMUSCULAR; INTRAVENOUS AS NEEDED
Status: DISCONTINUED | OUTPATIENT
Start: 2024-03-06 | End: 2024-03-06 | Stop reason: HOSPADM

## 2024-03-06 RX ORDER — LIDOCAINE HYDROCHLORIDE 20 MG/ML
INJECTION, SOLUTION INFILTRATION; PERINEURAL AS NEEDED
Status: DISCONTINUED | OUTPATIENT
Start: 2024-03-06 | End: 2024-03-06 | Stop reason: HOSPADM

## 2024-03-06 RX ORDER — ACETAMINOPHEN 500 MG
1000 TABLET ORAL EVERY 6 HOURS PRN
COMMUNITY

## 2024-03-06 NOTE — POST-PROCEDURE NOTE
Physician Transition of Care Summary  Invasive Cardiovascular Lab    Procedure Date: 3/6/2024  Attending:    * Carlos Calixto - Primary  Resident/Fellow/Other Assistant: Surgeon(s) and Role:    Indications:   Pre-op Diagnosis     * Paroxysmal atrial fibrillation (CMS/HCC) [I48.0]     * Multiple myeloma not having achieved remission (CMS/HCC) [C90.00]     * Other restrictive cardiomyopathy (CMS/HCC) [I42.5]    Post-procedure diagnosis:   Post-op Diagnosis     * Paroxysmal atrial fibrillation (CMS/HCC) [I48.0]     * Multiple myeloma not having achieved remission (CMS/HCC) [C90.00]     * Other restrictive cardiomyopathy (CMS/HCC) [I42.5]    Procedure(s):   Endomyocardial Biopsy  30661 - IN ENDOMYOCARDIAL BIOPSY    Right Heart Cath  37876 - IN RIGHT HEART CATH O2 SATURATION & CARDIAC OUTPUT        Procedure Findings:   Preserved CO/CI with modestly elevated filling pressures    Description of the Procedure:   RHC with EMBx via RIJ    Complications:   None    Estimated Blood Loss:   5 mL    Anesthesia: Moderate Sedation Anesthesia Staff: No anesthesia staff entered.    Any Specimen(s) Removed:   Order Name Source Comment Collection Info Order Time   SURGICAL PATHOLOGY EXAM HEART BIOPSY   3/6/2024  8:12 AM     How many specimens will you need? (If more than 16 start a new case)   1          Source of Specimen A:   HEART BIOPSY          Procedure:   EMBx          Specify Site:   RV          Fixative:   Fresh          Clinical History/Special Request:   Amyloid workup            Disposition:   Follow up with Dr. Caicedo. Ok to resume DOAC tomorrow.       Electronically signed by: Carlos Calixto DO, 3/6/2024 8:58 AM

## 2024-03-06 NOTE — PROGRESS NOTES
Pharmacy Medication History Review    Mirella Renae is a 73 y.o. female admitted for Other restrictive cardiomyopathy (CMS/LTAC, located within [facility]). Pharmacy reviewed the patient's [id number] medications and allergies for accuracy.    The list below reflects the updated PTA list. Comments regarding how patient may be taking medications differently can be found in the Admit Orders Activity  Prior to Admission Medications   Prescriptions Last Dose Informant   acetaminophen (Tylenol) 500 mg tablet     Sig: Take 2 tablets (1,000 mg) by mouth every 6 hours if needed for mild pain (1 - 3).   albuterol (Ventolin HFA) 90 mcg/actuation inhaler Unknown    Sig: Inhale 2 puffs every 4 hours if needed for wheezing or shortness of breath.   cyanocobalamin (Vitamin B-12) 1,000 mcg tablet Past Week    Sig: Take 1 tablet (1,000 mcg) by mouth once daily.   hydroxychloroquine (Plaquenil) 200 mg tablet 3/5/2024    Sig: TAKE 1 TABLET BY MOUTH DAILY   metoprolol tartrate (Lopressor) 25 mg tablet 3/5/2024    Sig: TAKE 1 TABLET TWICE DAILY.   multivitamin tablet Past Week    Sig: Take 1 tablet by mouth once daily.   rivaroxaban (Xarelto) 20 mg tablet 3/3/2024    Sig: TAKE ONE TABLET BY MOUTH DAILY   valsartan-hydrochlorothiazide (Diovan-HCT) 160-12.5 mg tablet 3/5/2024    Sig: TAKE 1 TABLET BY MOUTH ONCE DAILY.      Facility-Administered Medications: None       The list below reflects the updated allergy list. Please review each documented allergy for additional clarification and justification.  Allergies  Reviewed by Steve Banegas MD on 3/1/2024   No Known Allergies         M2B service not offered prior to surgery, please reassess prior to patient discharge if Meds to Beds is desired. Pharmacy has been updated to [facility].    Sources used to complete the med history include out patient fill history, OARRS, and patient interview   Reliable historian- medication list at bedside for review    Below are additional concerns with the patient's PTA  list.  N/A    Damian Rice PharmD  Transitions of Care Pharmacist   Meds [facility]  Please reach out via Secure Chat for questions, or if no response call [phone] or vocera [facility]

## 2024-03-06 NOTE — Clinical Note
Sheath was exchanged in the right internal jugular vein with ACCESS KIT, S-KLEBER MINI, 4FR 10CM 0.018IN 40CM, NT/PT, ECHO ENHANCE NEEDLE.

## 2024-03-08 LAB
LABORATORY COMMENT REPORT: NORMAL
PATH REPORT.COMMENTS IMP SPEC: NORMAL
PATH REPORT.FINAL DX SPEC: NORMAL
PATH REPORT.GROSS SPEC: NORMAL
PATH REPORT.RELEVANT HX SPEC: NORMAL
PATH REPORT.TOTAL CANCER: NORMAL

## 2024-03-11 ENCOUNTER — LAB (OUTPATIENT)
Dept: LAB | Facility: LAB | Age: 74
End: 2024-03-11
Payer: MEDICARE

## 2024-03-11 DIAGNOSIS — M35.1 OTHER OVERLAP SYNDROMES (MULTI): ICD-10-CM

## 2024-03-11 DIAGNOSIS — Z79.899 OTHER LONG TERM (CURRENT) DRUG THERAPY: ICD-10-CM

## 2024-03-11 DIAGNOSIS — M06.4 INFLAMMATORY POLYARTHROPATHY (MULTI): Primary | ICD-10-CM

## 2024-03-11 LAB
ALBUMIN SERPL BCP-MCNC: 4 G/DL (ref 3.4–5)
ALP SERPL-CCNC: 75 U/L (ref 33–136)
ALT SERPL W P-5'-P-CCNC: 10 U/L (ref 7–45)
ANION GAP SERPL CALC-SCNC: 12 MMOL/L (ref 10–20)
AST SERPL W P-5'-P-CCNC: 14 U/L (ref 9–39)
BASOPHILS # BLD AUTO: 0.03 X10*3/UL (ref 0–0.1)
BASOPHILS NFR BLD AUTO: 0.9 %
BILIRUB SERPL-MCNC: 0.5 MG/DL (ref 0–1.2)
BUN SERPL-MCNC: 28 MG/DL (ref 6–23)
CALCIUM SERPL-MCNC: 9.2 MG/DL (ref 8.6–10.3)
CHLORIDE SERPL-SCNC: 106 MMOL/L (ref 98–107)
CO2 SERPL-SCNC: 24 MMOL/L (ref 21–32)
CREAT SERPL-MCNC: 1.02 MG/DL (ref 0.5–1.05)
EGFRCR SERPLBLD CKD-EPI 2021: 59 ML/MIN/1.73M*2
EOSINOPHIL # BLD AUTO: 0.08 X10*3/UL (ref 0–0.4)
EOSINOPHIL NFR BLD AUTO: 2.5 %
ERYTHROCYTE [DISTWIDTH] IN BLOOD BY AUTOMATED COUNT: 12.2 % (ref 11.5–14.5)
GLUCOSE SERPL-MCNC: 111 MG/DL (ref 74–99)
HCT VFR BLD AUTO: 38 % (ref 36–46)
HGB BLD-MCNC: 12.4 G/DL (ref 12–16)
IMM GRANULOCYTES # BLD AUTO: 0.01 X10*3/UL (ref 0–0.5)
IMM GRANULOCYTES NFR BLD AUTO: 0.3 % (ref 0–0.9)
LYMPHOCYTES # BLD AUTO: 1.07 X10*3/UL (ref 0.8–3)
LYMPHOCYTES NFR BLD AUTO: 33.3 %
MCH RBC QN AUTO: 31.2 PG (ref 26–34)
MCHC RBC AUTO-ENTMCNC: 32.6 G/DL (ref 32–36)
MCV RBC AUTO: 96 FL (ref 80–100)
MONOCYTES # BLD AUTO: 0.36 X10*3/UL (ref 0.05–0.8)
MONOCYTES NFR BLD AUTO: 11.2 %
NEUTROPHILS # BLD AUTO: 1.66 X10*3/UL (ref 1.6–5.5)
NEUTROPHILS NFR BLD AUTO: 51.8 %
NRBC BLD-RTO: 0 /100 WBCS (ref 0–0)
PLATELET # BLD AUTO: 181 X10*3/UL (ref 150–450)
POTASSIUM SERPL-SCNC: 4 MMOL/L (ref 3.5–5.3)
PROT SERPL-MCNC: 7.5 G/DL (ref 6.4–8.2)
RBC # BLD AUTO: 3.97 X10*6/UL (ref 4–5.2)
SODIUM SERPL-SCNC: 138 MMOL/L (ref 136–145)
WBC # BLD AUTO: 3.2 X10*3/UL (ref 4.4–11.3)

## 2024-03-11 PROCEDURE — 80053 COMPREHEN METABOLIC PANEL: CPT

## 2024-03-11 PROCEDURE — 36415 COLL VENOUS BLD VENIPUNCTURE: CPT

## 2024-03-11 PROCEDURE — 85025 COMPLETE CBC W/AUTO DIFF WBC: CPT

## 2024-03-22 ENCOUNTER — PHARMACY VISIT (OUTPATIENT)
Dept: PHARMACY | Facility: CLINIC | Age: 74
End: 2024-03-22

## 2024-03-22 PROCEDURE — RXMED WILLOW AMBULATORY MEDICATION CHARGE

## 2024-04-01 ENCOUNTER — HOSPITAL ENCOUNTER (OUTPATIENT)
Dept: RADIOLOGY | Facility: HOSPITAL | Age: 74
Discharge: HOME | End: 2024-04-01
Payer: MEDICARE

## 2024-04-01 DIAGNOSIS — E04.2 MULTIPLE THYROID NODULES: ICD-10-CM

## 2024-04-01 DIAGNOSIS — C90.00 MULTIPLE MYELOMA NOT HAVING ACHIEVED REMISSION (MULTI): ICD-10-CM

## 2024-04-01 PROCEDURE — 76536 US EXAM OF HEAD AND NECK: CPT

## 2024-04-01 PROCEDURE — 76536 US EXAM OF HEAD AND NECK: CPT | Performed by: RADIOLOGY

## 2024-04-02 ENCOUNTER — TELEMEDICINE (OUTPATIENT)
Dept: HEMATOLOGY/ONCOLOGY | Facility: CLINIC | Age: 74
End: 2024-04-02
Payer: MEDICARE

## 2024-04-02 DIAGNOSIS — C90.00 MULTIPLE MYELOMA NOT HAVING ACHIEVED REMISSION (MULTI): ICD-10-CM

## 2024-04-02 DIAGNOSIS — E04.2 MULTIPLE THYROID NODULES: ICD-10-CM

## 2024-04-02 PROCEDURE — 99214 OFFICE O/P EST MOD 30 MIN: CPT | Performed by: INTERNAL MEDICINE

## 2024-04-02 PROCEDURE — 1160F RVW MEDS BY RX/DR IN RCRD: CPT | Performed by: INTERNAL MEDICINE

## 2024-04-02 PROCEDURE — 1159F MED LIST DOCD IN RCRD: CPT | Performed by: INTERNAL MEDICINE

## 2024-04-02 NOTE — PROGRESS NOTES
Patient ID: Candace Elmore is a 73 y.o. female.  Referring Physician: Steve Banegas MD  [id number] [address] [id number]  Primary Care Provider: Mirtha Benjamin MD      Subjective    HPI  ID Statement:    CANDACE ELMORE is a 72 year old Female        Chief Complaint: Evaluation for leukopenia   Interval History:    Referred by Dr. Mirtha Benjamin      Reason for referral: Leukopenia      HPI   70 year old woman with hx of HTN, anxiety, who is referred for leukopenia, her labs on 6/30/ 2021 and on 8/18/21 showed wbc of 2.4K and  2.6K respectively, ANC was 1.20 and 1.12 respectively, there were 1% atypical lymphs on the first differential. Hg was normal at 12.1 g/dl and platelets normal at 176. B12 level was 288.      she is feeling well in general   she had a thumb infection and cellulitis after he cat bit her, she received PO antibiotics and it resolves  no fever, no UTI, no hx of pneumonia or other infections   no bleeding issues, no hematochezia or melena   she has good level of energy  and is still working part time    she has some arthritic pain in hip and lower back, no stiffness or swelling in the hand or feet joints   no abdominal , nausea or vomiting, no weight loss   no change in bowel habits   she just had a mmg that negative and is awaiting to have a colonoscopy   no headaches, mild intermittent short episodes of dizziness   no weakness or numbness   lost some weight last year but gained back      had her colonoscopy done march 2022 which was unremarkable      interval history -4/2/2024    She is status post endomyocardial biopsy which did not show any myeloid deposition or other significant pathology    she continues to have her dizziness episodes   Variable in intensity and frequency without a clear pattern   No syncope episodes   She will be following cardiology later this week  She remains active  Denies nausea or vomiting  Denies abdominal pain  Eating and drinking well, no  infections, no weight loss     PAST MEDICAL HISTORY:   HTN, anxiety  muscle tumor removal   appendectomy   tubal ligation      takes a mvi from OT      SOCIAL HISTORY:   works as a consultant and    no cigarette smoking  very occasional alcohol   has 2 children      FAMILY HISTORY:    colon cancer runs in family, her mother and grandmother had it, grandmother was in her 40s   No other specific history of bleeding, clotting or malignant disorder in the family.     REVIEW OF SYSTEMS:  Pertinent finding as per the history above.  There are no additional specific symptoms pertaining to eyes, ENT, hematologic, lymphatic, neurological, psychiatric, cardiac,  pulmonary, GI, , endocrine, rheumatic, dermatological, or musculoskeletal systems.  All other systems have been reviewed and generally negative and noncontributory.     PHYSICAL EXAMINATION:    GENERAL:  Age-appropriate, in no acute discomfort.     HEENT:  Normocephalic and atraumatic.   NECK:  Supple   Heart: Regular, no murmur  CHEST:  breathing non labored, clear to auscultation  EXTREMITIES:  No cyanosis, clubbing, or edema.  NEUROLOGICAL:  Alert, awake, and oriented     LAB DATA:  Latest labs were reviewed in the EMR and from the outside sources.      Objective   BSA: There is no height or weight on file to calculate BSA.  There were no vitals taken for this visit.    Wt Readings from Last 3 Encounters:   03/01/24 84.6 kg (186 lb 8.2 oz)   02/22/24 85.3 kg (188 lb)   12/18/23 85.7 kg (189 lb)   ]    Family History   Problem Relation Name Age of Onset    Coronary artery disease Mother      Hyperlipidemia Mother      Hypertension Mother      Colon cancer Mother      Heart attack Mother      Thyroid disease Mother      Thyroid cancer Son      Colon cancer Other       Oncology History    No history exists.       Mirella Renae  reports that she has never smoked. She has never used smokeless tobacco.  She  reports that she does not currently use  alcohol.  She  reports no history of drug use.    Physical Exam    Performance Status:  Symptomatic; fully ambulatory    Assessment/Plan      1. Smoldering multiple myeloma   her evaluation led to the diagnosis of smoldering multiple myeloma   bone marrow biopsy showed 10-15% plasma cells   no evidence of end organ damage , PET scan without any bone lesions   low risk SMM based on current M spike , FLC and PC percentage   plan for surveillance , the repeat labs demonstrate overall stability of her counts as well as of the paraprotein levels      8/15/22- the repeat SPEP, FLC and IG demonstrate stability, serum IG slightly improved, the M protein has been stable at 1.5-1.7 g/dl and at this time we will not pursue additional testing or therapies   however she has lower back pain and we will investigate with an MRI of the lumbar spine to rule out myelomatous involvement   will also check a dexa scan and vit D levels with next labs and consider anti resorptive therapy depending on results      12/21/22- she had repeat labs showing stability of the M spike, SPEP, FLC and the serum IG   at this time no emerging end organ damage, will plan to continue surveillance , monitor the kidney function, cr fluctuates slightly      4/28/23- she has noticed increasing dizziness episodes   additionally secondary to her arrythmia, cardiology suggested doing a cardiac MRI but she cannot secondary to marked claustrophobia and is not excited for a cardiac biopsy      I thinks she needs neurologic evaluation for autonomic dysfunction   additionally will request pathology to run an amyloid stain on the prior bone marrow biopsy   Cr slightly higher, obtain 24 hrs urine collection for protein and UPEP   add BNP and troponin to next labs      if all the above testing is negative, her counts and serum proteins are otherwise stable and will continue surveillance   if testing is suggestive of AL amyloidosis then will warrant therapy      8/19-  still suffering from likely symptoms of autonomic dysfunction   also cardiology recommending the investigation for cardiac amyloid with a bx, she is thinking about it   her laboratory parameters have not changed, the amyloid stain on BMBx was negative   will hold off any treatment , unless proven to have amyloidosis as there are no other organ dysfunction     11/29- myeloma labs slightly higher, and ANC lower   We discussed to repeat PET scan to which she agreed and to repeat marrow exam which we agreed to defer until next lab draw to establish trend   She is considering the endomyocardial biopsy, will repeat BNP as well     3/1/2024-the repeat PET scan showed heterogeneous marrow which is suggestive of myelomatous involvement however there is no discrete lytic lesions BNP is still elevated, she had met with cardiology and she is planned for endomyocardial biopsy scheduled next week  We will also plan for a repeat bone marrow biopsy but deferred now until she goes through her planned cardiac biopsy    4/2/2024-   Patient is status post endomyocardial biopsy which did not show any myeloid deposition, she will follow-up with cardiology for discussion of the results later this week  At this time her labs seem to be stable and no discernible end-organ damage, we discussed to repeat a bone marrow biopsy, but she prefers to defer at this time especially if her labs remain stable, we will plan to repeat labs in 3 to 4 months     2. Leukopenia with mild neutropenia   differential diagnosis and work up discussed with patient   plan to obtain the following testing   -nutritional deficiencies: check folic acid and start PO b12 for borderline level   -SPEP for monoclonal process   -peripheral blood flow cytometry for phenotypic abnormalities or abnormal cell population   -US abd for HSM   -viral testing hep and HIV      8/15/22- wbc overall stable, however her joint pain has worsened , she has been evaluated by rheumatology for  her arthritis and positive BIANCA,she has started on HCQ with improvement of joint pains, wbc and ANC stable today     12/21/22- wbc and ANC generally stable, she is on HCQ for joint pain   4/28/23- the wbc and ANC generally are in same range around 1.1-1.4   8/19-23- ANC at 1.5 and wbc at 3.0, largely stable    11/29- slightly lower ANC, considering repeating BMBx  3/1/2024-ANC and WBC are stable     3. dizziness, fatigue   at this time we will refer to neurology for autonomic dysfunction evaluation   she additionally has A flutter and could potentially be contributing to symptoms      as above evaluated with neurology and now with cardiology, considering cardiac bx      3/1/2024-as above planned for endomyocardial biopsy  4-2-24-at this time the etiology is not very clear, the endomyocardial biopsy did not show amyloidosis, advised her to follow-up with both neurology and cardiology    4.  Abnormal thyroid uptake on the PET scan  Will order ultrasound    4/2/2024-ultrasound of the thyroid showed multiple TI-RADS 4 nodules, will refer to ENT for consideration of FNA of the largest nodule    RTC 3 m with labs    Steve Banegas MD

## 2024-04-04 ENCOUNTER — OFFICE VISIT (OUTPATIENT)
Dept: CARDIOLOGY | Facility: CLINIC | Age: 74
End: 2024-04-04
Payer: MEDICARE

## 2024-04-04 VITALS
SYSTOLIC BLOOD PRESSURE: 130 MMHG | BODY MASS INDEX: 30.37 KG/M2 | OXYGEN SATURATION: 100 % | HEART RATE: 94 BPM | DIASTOLIC BLOOD PRESSURE: 70 MMHG | WEIGHT: 187.4 LBS

## 2024-04-04 DIAGNOSIS — I48.0 PAROXYSMAL ATRIAL FIBRILLATION (MULTI): Primary | ICD-10-CM

## 2024-04-04 DIAGNOSIS — C90.00 MULTIPLE MYELOMA NOT HAVING ACHIEVED REMISSION (MULTI): ICD-10-CM

## 2024-04-04 DIAGNOSIS — I50.32 CHRONIC HEART FAILURE WITH PRESERVED EJECTION FRACTION (MULTI): ICD-10-CM

## 2024-04-04 PROCEDURE — 99214 OFFICE O/P EST MOD 30 MIN: CPT | Performed by: STUDENT IN AN ORGANIZED HEALTH CARE EDUCATION/TRAINING PROGRAM

## 2024-04-04 PROCEDURE — 1160F RVW MEDS BY RX/DR IN RCRD: CPT | Performed by: STUDENT IN AN ORGANIZED HEALTH CARE EDUCATION/TRAINING PROGRAM

## 2024-04-04 PROCEDURE — 3078F DIAST BP <80 MM HG: CPT | Performed by: STUDENT IN AN ORGANIZED HEALTH CARE EDUCATION/TRAINING PROGRAM

## 2024-04-04 PROCEDURE — 1036F TOBACCO NON-USER: CPT | Performed by: STUDENT IN AN ORGANIZED HEALTH CARE EDUCATION/TRAINING PROGRAM

## 2024-04-04 PROCEDURE — 1159F MED LIST DOCD IN RCRD: CPT | Performed by: STUDENT IN AN ORGANIZED HEALTH CARE EDUCATION/TRAINING PROGRAM

## 2024-04-04 PROCEDURE — 3075F SYST BP GE 130 - 139MM HG: CPT | Performed by: STUDENT IN AN ORGANIZED HEALTH CARE EDUCATION/TRAINING PROGRAM

## 2024-04-04 NOTE — PROGRESS NOTES
Cardiology Subsequent Encounter Clinic Note  Name: Mirella Renae  MRN: [id number]  : 1950    CC: Afib    Active Issues:  Mirella Renae is a 73 y.o. female with a medical history of hypertension, anxiety, who was recently evaluated by hematology oncology for complaints of leukopenia. Was incidentally noted to be in atrial flutter with controlled rates.     Longstanding persistent, nonvalvular  -Echocardiogram performed 2022 shows preserved biventricular function. Event monitor at the same time showed no evidence of high-grade AV block.  Current medical therapy includes Lopressor 25 mg twice daily, Xarelto 20 mg daily     Also has a medical history of mixed connective tissue disorders and multiple myeloma.  (Smoldering, last kappa lambda ratio around 4).     Some shortness of breath with moderate exertion; denies any orthopnea/PND/lower extremity edema. Continues to have significant dizziness particularly postural.  Blood pressure on clinic visits has been largely normotensive.       Past Medical History  Past Medical History:   Diagnosis Date    Anxiety disorder, unspecified 2022    Anxiety disorder    Encounter for general adult medical examination without abnormal findings 2020    Medicare annual wellness visit, initial    Essential (primary) hypertension 2022    Hypertension    Multiple myeloma (CMS/HCC)        Past Surgical History  Past Surgical History:   Procedure Laterality Date    BIOPSY      CARDIAC CATHETERIZATION N/A 2024    Procedure: Endomyocardial Biopsy;  Surgeon: Carlos Calixto DO;  Location: [place] [address] Cardiac Cath Lab;  Service: Cardiovascular;  Laterality: N/A;    CARDIAC CATHETERIZATION N/A 2024    Procedure: Right Heart Cath;  Surgeon: Carlos Calixto DO;  Location: [place] [address] Cardiac Cath Lab;  Service: Cardiovascular;  Laterality: N/A;    OTHER SURGICAL HISTORY  2020    Appendectomy    OTHER SURGICAL HISTORY  2020     Tubal ligation    OTHER SURGICAL HISTORY  03/30/2020    Tumor excision    OTHER SURGICAL HISTORY  06/08/2020    Colonoscopy       Medications  Current Outpatient Medications on File Prior to Visit   Medication Sig Dispense Refill    acetaminophen (Tylenol) 500 mg tablet Take 2 tablets (1,000 mg) by mouth every 6 hours if needed for mild pain (1 - 3).      albuterol (Ventolin HFA) 90 mcg/actuation inhaler Inhale 2 puffs every 4 hours if needed for wheezing or shortness of breath. 6.7 g 5    cyanocobalamin (Vitamin B-12) 1,000 mcg tablet Take 1 tablet (1,000 mcg) by mouth once daily.      hydroxychloroquine (PlaqueniL) 200 mg tablet Take 1 Tablet(s) Oral every day 90 tablet 1    metoprolol tartrate (Lopressor) 25 mg tablet TAKE 1 TABLET TWICE DAILY. 180 tablet 3    multivitamin tablet Take 1 tablet by mouth once daily.      rivaroxaban (Xarelto) 20 mg tablet TAKE ONE TABLET BY MOUTH DAILY 30 tablet 11    valsartan-hydrochlorothiazide (Diovan-HCT) 160-12.5 mg tablet TAKE 1 TABLET BY MOUTH ONCE DAILY. 90 tablet 1     No current facility-administered medications on file prior to visit.       Allergies  No Known Allergies    Social History  Social History     Tobacco Use    Smoking status: Never    Smokeless tobacco: Never   Substance Use Topics    Alcohol use: Not Currently    Drug use: Never       Family History  Family History   Problem Relation Name Age of Onset    Coronary artery disease Mother      Hyperlipidemia Mother      Hypertension Mother      Colon cancer Mother      Heart attack Mother      Thyroid disease Mother      Thyroid cancer Son      Colon cancer Other         Physical Examination  Vitals: /70   Pulse 94   Wt 85 kg (187 lb 6.4 oz)   SpO2 100%   BMI 30.37 kg/m²   General: awake, alert and oriented. No acute distress.   Skin: Skin is warm, dry and intact without rashes or lesions. Appropriate color for ethnicity. Nail beds pink with no cyanosis or clubbing  HEENT: normocephalic, atraumatic;  conjunctivae are clear without exudates or hemorrhage. Sclera is non-icteric. Eyelids are normal in appearance without swelling or lesions. Hearing intact. Nares are patent bilaterally. Moist mucous membranes.   Cardiovascular: Regular. No murmurs, gallops, or rubs are auscultated. S1 and S2 are heard and are of normal intensity. No JVD, no carotid bruits  Respiratory: Thorax symmetric. CTAB, breath sounds vesicular. No crackles, wheezes or ronchi.   Gastrointestinal: soft, non-distended, BS + x 4  Genitourinary: exam deferred  Musculoskeletal: moves all extremities  Extremities: pulses palpable bilaterally; no swelling or erythema; no edema  Neurological: alert & oriented x 3; no focal deficits  Psychiatric: appropriate mood and affect       Labs/Imaging/Procedures    Lab Results   Component Value Date    HGB 12.4 03/11/2024    HGB 12.1 02/22/2024    HGB 12.2 11/21/2023     03/11/2024    WBC 3.2 (L) 03/11/2024     03/11/2024    K 4.0 03/11/2024    CREATININE 1.02 03/11/2024    CREATININE 0.97 02/22/2024    CREATININE 1.00 11/21/2023    BUN 28 (H) 03/11/2024    CALCIUM 9.2 03/11/2024     (H) 02/22/2024    TROPHS 5 02/22/2024    TROPHS 5 08/11/2023    LDLF 71 09/20/2023     No echocardiogram results found for the past 12 months          Endomyocardial biopsy 3/6/2024:  Six fragments of endomyocardium are evaluated by light microscopy.  There is no significant increase in interstitial fibrosis (Trichrome stain) or myocyte hypertrophy.  There is no evidence of iron overload with a negative iron stain. [place] red stains for amyloid are negative.    Impression  Mirella Renae is a 73 y.o. female  with a medical history of hypertension, anxiety, who was recently evaluated by hematology oncology for complaints of leukopenia. Was incidentally noted to be in atrial flutter with controlled rates.     Longstanding persistent atrial fibrillation  -Echocardiogram performed September 2022 shows preserved  biventricular function. Event monitor at the same time showed no evidence of high-grade AV block.  Current medical therapy includes Lopressor 25 mg twice daily, Xarelto 20 mg daily     Plan:  -Longstanding atrial fibrillation: Rate controlled, compliant with Xarelto.  -Occasional episodes of dizziness do not appear to be cardiac.  Discussed talking to her PCP regarding noncardiac causes of dizziness  -Endomyocardial biopsies negative for cardiac amyloid.  It did note minimally elevated filling pressures which are likely related to her atrial fibrillation/mild HFpEF.  However she appears euvolemic on exam and does not appear to have any symptoms of volume overload.  Will hold off on aggressive loop diuretic initiation.  Notably she is on a thiazide diuretic.  -RTC 1 year      Humberto Caicedo MD  Advanced Heart Failure/Transplant Cardiology  Cardio-Oncology  [facility]

## 2024-04-05 ENCOUNTER — OFFICE VISIT (OUTPATIENT)
Dept: OTOLARYNGOLOGY | Facility: HOSPITAL | Age: 74
End: 2024-04-05
Payer: MEDICARE

## 2024-04-05 VITALS — TEMPERATURE: 97.3 F | WEIGHT: 188.7 LBS | BODY MASS INDEX: 28.6 KG/M2 | HEIGHT: 68 IN

## 2024-04-05 DIAGNOSIS — E04.2 MULTIPLE THYROID NODULES: Primary | ICD-10-CM

## 2024-04-05 DIAGNOSIS — C90.00 MULTIPLE MYELOMA NOT HAVING ACHIEVED REMISSION (MULTI): ICD-10-CM

## 2024-04-05 PROCEDURE — 99203 OFFICE O/P NEW LOW 30 MIN: CPT | Performed by: STUDENT IN AN ORGANIZED HEALTH CARE EDUCATION/TRAINING PROGRAM

## 2024-04-05 PROCEDURE — 1160F RVW MEDS BY RX/DR IN RCRD: CPT | Performed by: STUDENT IN AN ORGANIZED HEALTH CARE EDUCATION/TRAINING PROGRAM

## 2024-04-05 PROCEDURE — 99213 OFFICE O/P EST LOW 20 MIN: CPT | Performed by: STUDENT IN AN ORGANIZED HEALTH CARE EDUCATION/TRAINING PROGRAM

## 2024-04-05 PROCEDURE — 1159F MED LIST DOCD IN RCRD: CPT | Performed by: STUDENT IN AN ORGANIZED HEALTH CARE EDUCATION/TRAINING PROGRAM

## 2024-04-05 ASSESSMENT — PATIENT HEALTH QUESTIONNAIRE - PHQ9
2. FEELING DOWN, DEPRESSED OR HOPELESS: NOT AT ALL
SUM OF ALL RESPONSES TO PHQ9 QUESTIONS 1 & 2: 0
1. LITTLE INTEREST OR PLEASURE IN DOING THINGS: NOT AT ALL

## 2024-04-05 NOTE — PROGRESS NOTES
Subjective   Patient ID: Mirella Renae is a 73 y.o. female who presents for thyroid nodules    HPI  She has a history of multiple myeloma who underwent a PET scan that demonstrated multiple mildly hypermetabolic nodules throughout the thyroid gland. She underwent a thyroid ultrasound which demonstrated multiple nodules, two TIRADS-4 and two TIRADS-3.     She does have a family history of thyroid cancer, both in her mom and her son.     She denies any dysphagia, dyspnea, odynophagia, choking, heat intolerance, cold intolerance.     She currently has not required treatment for her multiple myeloma, and is planning to undergo a new bone marrow biopsy in the future.     She has a history of Afib on blood thinner and HTN     Objective   Physical Exam  Constitutional:       Appearance: Normal appearance.   HENT:      Head: Normocephalic and atraumatic.      Right Ear: Tympanic membrane and ear canal normal.      Left Ear: Tympanic membrane and ear canal normal.      Mouth/Throat:      Mouth: Mucous membranes are moist.   Neck:      Comments: No firm masses palpated at the thyroid  Musculoskeletal:      Cervical back: Normal range of motion and neck supple.   Neurological:      Mental Status: She is alert.       Ultrasound 3/1/24:  IMPRESSION:  1. Mild thyroid enlargement bilaterally, more on the right side.  2. 3 nodules on the right side measuring 14 x 10 x 7 mm, 10 x 7 x 7  mm and 18 x 16 x 15 mm.  3. Left thyroid nodule measuring 15 x 14 x 10 mm.     Assessment/Plan    Mirella Renae is a 73 y.o. female with multiple myeloma who presents today with new thyroid nodules in the setting of multiple myeloma. She does have a family history of thyroid cancer, however, she currently does not have any symptoms and none of her nodules do meet strict size criteria for biopsy. At this time, given her lack of symptoms and the size of her nodule, we discussed that we could consider close follow-up with ultrasound versus moving  forward with a biopsy. We have placed an ultrasound order in, but she will call us if she elects to proceed with biopsy.          Brennan Maguire MD 04/05/24 3:38 PM

## 2024-04-10 ENCOUNTER — PHARMACY VISIT (OUTPATIENT)
Dept: PHARMACY | Facility: CLINIC | Age: 74
End: 2024-04-10
Payer: COMMERCIAL

## 2024-04-10 PROCEDURE — RXMED WILLOW AMBULATORY MEDICATION CHARGE

## 2024-04-11 ENCOUNTER — TELEPHONE (OUTPATIENT)
Dept: OTOLARYNGOLOGY | Facility: HOSPITAL | Age: 74
End: 2024-04-11
Payer: MEDICARE

## 2024-04-11 NOTE — TELEPHONE ENCOUNTER
Mrs. Renae is returning a call to Dr. Maguire, states he called regarding more treatment options, please follow up with patient at [phone] or [phone]. Thank you!

## 2024-04-27 ENCOUNTER — PHARMACY VISIT (OUTPATIENT)
Dept: PHARMACY | Facility: CLINIC | Age: 74
End: 2024-04-27
Payer: COMMERCIAL

## 2024-04-27 PROCEDURE — RXMED WILLOW AMBULATORY MEDICATION CHARGE

## 2024-05-10 ENCOUNTER — PHARMACY VISIT (OUTPATIENT)
Dept: PHARMACY | Facility: CLINIC | Age: 74
End: 2024-05-10
Payer: COMMERCIAL

## 2024-05-10 PROCEDURE — RXMED WILLOW AMBULATORY MEDICATION CHARGE

## 2024-06-05 ENCOUNTER — OFFICE VISIT (OUTPATIENT)
Dept: PRIMARY CARE | Facility: CLINIC | Age: 74
End: 2024-06-05
Payer: MEDICARE

## 2024-06-05 VITALS
DIASTOLIC BLOOD PRESSURE: 68 MMHG | OXYGEN SATURATION: 97 % | HEIGHT: 68 IN | HEART RATE: 86 BPM | WEIGHT: 183 LBS | BODY MASS INDEX: 27.74 KG/M2 | SYSTOLIC BLOOD PRESSURE: 110 MMHG

## 2024-06-05 DIAGNOSIS — S12.9XXA: ICD-10-CM

## 2024-06-05 DIAGNOSIS — M06.4 INFLAMMATORY POLYARTHROPATHY (MULTI): Primary | ICD-10-CM

## 2024-06-05 DIAGNOSIS — S14.3XXA INJURY OF BRACHIAL PLEXUS, INITIAL ENCOUNTER: ICD-10-CM

## 2024-06-05 DIAGNOSIS — M35.1 MIXED CONNECTIVE TISSUE DISEASE (MULTI): ICD-10-CM

## 2024-06-05 PROCEDURE — 1159F MED LIST DOCD IN RCRD: CPT | Performed by: FAMILY MEDICINE

## 2024-06-05 PROCEDURE — 3074F SYST BP LT 130 MM HG: CPT | Performed by: FAMILY MEDICINE

## 2024-06-05 PROCEDURE — 1124F ACP DISCUSS-NO DSCNMKR DOCD: CPT | Performed by: FAMILY MEDICINE

## 2024-06-05 PROCEDURE — 1160F RVW MEDS BY RX/DR IN RCRD: CPT | Performed by: FAMILY MEDICINE

## 2024-06-05 PROCEDURE — 1036F TOBACCO NON-USER: CPT | Performed by: FAMILY MEDICINE

## 2024-06-05 PROCEDURE — 99496 TRANSJ CARE MGMT HIGH F2F 7D: CPT | Performed by: FAMILY MEDICINE

## 2024-06-05 PROCEDURE — 3078F DIAST BP <80 MM HG: CPT | Performed by: FAMILY MEDICINE

## 2024-06-05 RX ORDER — LANOLIN ALCOHOL/MO/W.PET/CERES
400 CREAM (GRAM) TOPICAL
COMMUNITY
Start: 2024-06-03 | End: 2024-07-04

## 2024-06-05 RX ORDER — PANTOPRAZOLE SODIUM 40 MG/1
40 TABLET, DELAYED RELEASE ORAL
COMMUNITY
Start: 2024-06-03 | End: 2024-07-04

## 2024-06-05 RX ORDER — NALOXONE HYDROCHLORIDE 4 MG/.1ML
SPRAY NASAL
COMMUNITY
Start: 2024-06-03

## 2024-06-05 RX ORDER — GABAPENTIN 400 MG/1
800 CAPSULE ORAL 3 TIMES DAILY
COMMUNITY
Start: 2024-06-03 | End: 2024-07-04

## 2024-06-05 RX ORDER — OXYCODONE HYDROCHLORIDE 5 MG/1
5 TABLET ORAL EVERY 6 HOURS
COMMUNITY
Start: 2024-06-03 | End: 2024-06-11

## 2024-06-05 RX ORDER — ERGOCALCIFEROL 1.25 MG/1
50000 CAPSULE ORAL
COMMUNITY
Start: 2024-06-07 | End: 2024-07-07

## 2024-06-05 ASSESSMENT — PATIENT HEALTH QUESTIONNAIRE - PHQ9
1. LITTLE INTEREST OR PLEASURE IN DOING THINGS: NOT AT ALL
SUM OF ALL RESPONSES TO PHQ9 QUESTIONS 1 AND 2: 0
2. FEELING DOWN, DEPRESSED OR HOPELESS: NOT AT ALL

## 2024-06-05 NOTE — PROGRESS NOTES
"     Subjective  Mirella Renae is a 73 y.o. female who presents for Follow-up.  HPI  Here for hospital followup.  Admitted to [facility] 5/15, and discharged from rehab yesterday 6/4.  Spouse Guille with her today.    From the discharge summary,  \"Mirella Renae is a 73 y.o. female patient of Mirtha Benjamin MD with history of anxiety, paroxysmal atrial fibrillation on Xarelto, mixed connective tissue disease, hypertension, and smoldering multiple myeloma who presented on 5/15/2024 with a fall from a 5 foot high deck.  The patient was found to have hematomas and fractures and admitted to trauma service.  [facility] [place] consulted by Wilmer Tejeda for management of her medical problems.      Fall  C7, T1, and T2 cervical left transverse procedures fractures  Right L2 transverse process acute fracture  Neck hematoma  Third and fourth right metacarpal bones fractures  The patient had a fall from a 5 foot high deck.    CT cervical spine reviewed showed fractures of C7, T1, and T2 left transverse processes.  High density within the spinal canal is noted and suggestive of hemorrhage.  Soft tissue hematoma on the left side of the neck is noted as well.     CT maxillofacial, x-ray of the pelvis, x-ray of the left humerus, and x-ray of the left shoulder with no acute fractures  X-ray of the right hand showed third and fourth metacarpals fractures.  CTA chest abdomen and pelvis showed large hematoma at the base of the neck on the left side into the subclavicular region with hemorrhagic fat stranding tracking in the upper mediastinum.  There is a small focus of extravasation within the hematoma suggesting an active bleeder.  CT of the brain with no acute intracranial changes  CTA of the neck suggested continued active bleeding  The patient is on Xarelto at home and INR on presentation 2.2.  Received PCC for reversal of Xarelto.  Vascular surgery were consulted and recommended conservative management.  Trauma documented that " they discussed with vascular surgery and it is believed that the bleeder is too small and that the bleeding will be controlled conservatively.  Orthopedic surgery consulted for the metacarpal bones fractures  ENT consulted for the neck hematoma.  They recommended monitoring.  No airway compromise at this time.  MRI cervical, C5-C6 spondylosis and mild/moderate degenerative changes, mild central canal stenosis at C5-C6, large persistent posttraumatic soft tissue hematoma in the anterior and lateral left neck     Orthopedic surgery consulted, no surgical intervention needed from orthopedic surgery perspective, patient to follow-up with orthopedic clinic in 2 weeks     Neurosurgery consulted.  s/p  C5-6, C6-7 anterior cervical discectomy with fusion 5/22      Possible delirium, resolved   CT brain without contrast on 5/18 negative for acute findings  Encourage orientation  Minimize sedation and narcotics     Acute respiratory failure hypoxia likely due to diastolic dysfunction  Acute on chronic diastolic heart failure exacerbation  Echocardiogram ejection fraction 57%, indeterminate diastolic function with elevated filling pressure, pulmonary hypertension with lung pressure 52  Oxygen treatment via nasal cannula per protocol to maintain oxygen sat greater than 90  Cont lasix 20 mg bid      Possible acute on chronic anemia  Mild thrombocytopenia  Hemoglobin 11.3 on presentation and slowly trending down.    Platelets 120  Continue to monitor.  iron panel, vitamin B-12, folate unremarkable     Hypertension  Paroxysmal atrial fibrillation  Lopressor switched to IV.  Switch to p.o. when able to take oral medications  Hold valsartan-hydrochlorothiazide due to being n.p.o. and resume when able  Echocardiogram reading ejection fraction 57%, indeterminate diastolic function with elevated filling pressure, pulmonary hypertension estimated right ventricular systolic pressure 52     Mixed connective tissue disease  Resume  hydroxychloroquine following discharge     Vitamin D deficiency  Osteopenia on CT scan  Vitamin D level 19.  Start vitamin D and calcium supplements when able to take oral medications     Thyroid nodule  CTA showed nonspecific hypodense right thyroid lobe nodule.  This will need outpatient evaluation with ultrasound.     Hyperbilirubinemia   Follow trend , improved , will get RUQ US               Results from last 7 days   Lab Units 05/23/24  0433 05/22/24  0418 05/21/24  1803   BILIRUBIN TOTAL mg/dL 1.4* 1.9* 3.5*   BILIRUBIN DIRECT mg/dL 0.7* 1.2* 2.6*               Discharge Medications      Discharge Medications          Unreviewed Medications         Details   hydroxychloroquine 200 mg tablet  Commonly known as: PLAQUENIL    Take 1 (one) tablet (200 mg total) by mouth daily .      metoprolol tartrate 25 MG tablet  Commonly known as: LOPRESSOR    Take 1 (one) tablet (25 mg total) by mouth 2 (two) times a day .      rivaroxaban 20 mg Tab  Commonly known as: XARELTO    Take 1 (one) tablet (20 mg total) by mouth daily .      valsartan-hydrochlorothiazide 160-12.5 mg per tablet  Commonly known as: DIOVAN-HCT    Take 1 (one) tablet by mouth daily .                       Diovan/hydrochlorothiazide was held at discharge from the rehab due to low bp.  First day holding it was yesterday per patient.  Will monitor bp and let us know.  She has some hand movement in left arm but otherwise no use of left arm.  Hematoma left marked improved.  Going to therapy, optimistic for recovery, has equipment she needs at home  History of mctd and inflammatory polyarthropathy seeing rheum.      Patient Active Problem List   Diagnosis    Anxiety disorder    Hypertension    Mixed connective tissue disease (Multi)    Paroxysmal atrial fibrillation (Multi)    Smoldering multiple myeloma    Multiple myeloma not having achieved remission (Multi)    Other restrictive cardiomyopathy (Multi)    Inflammatory polyarthropathy (Multi)        Current  Outpatient Medications:     acetaminophen (Tylenol) 500 mg tablet, Take 2 tablets (1,000 mg) by mouth every 6 hours if needed for mild pain (1 - 3)., Disp: , Rfl:     cyanocobalamin (Vitamin B-12) 1,000 mcg tablet, Take 1 tablet (1,000 mcg) by mouth once daily., Disp: , Rfl:     gabapentin (Neurontin) 400 mg capsule, Take 2 capsules (800 mg) by mouth 3 times a day., Disp: , Rfl:     hydroxychloroquine (PlaqueniL) 200 mg tablet, Take 1 Tablet(s) Oral every day, Disp: 90 tablet, Rfl: 1    magnesium oxide (Mag-Ox) 400 mg (241.3 mg magnesium) tablet, Take 1 tablet (400 mg) by mouth once daily., Disp: , Rfl:     metoprolol tartrate (Lopressor) 25 mg tablet, TAKE 1 TABLET TWICE DAILY., Disp: 180 tablet, Rfl: 3    oxyCODONE (Roxicodone) 5 mg immediate release tablet, Take 1 tablet (5 mg) by mouth every 6 hours., Disp: , Rfl:     pantoprazole (ProtoNix) 40 mg EC tablet, Take 1 tablet (40 mg) by mouth once daily., Disp: , Rfl:     rivaroxaban (Xarelto) 20 mg tablet, TAKE ONE TABLET BY MOUTH DAILY, Disp: 30 tablet, Rfl: 11    [START ON 6/7/2024] ergocalciferol (Vitamin D-2) 1.25 MG (11572 UT) capsule, Take 1 capsule (50,000 Units) by mouth every 7 days. Do not fill before June 7, 2024., Disp: , Rfl:     multivitamin tablet, Take 1 tablet by mouth once daily., Disp: , Rfl:     naloxone (Narcan) 4 mg/0.1 mL nasal spray, Administer 1 spray into one nostril for known or suspected opioid overdose. If patient worsens or does not respond, may repeat in 2-3 minutes. ., Disp: , Rfl:    Review of Systems   All other systems reviewed and are negative.  .    Objective     Visit Vitals  /68 (BP Location: Right arm, Patient Position: Sitting)   Pulse 86      Physical Exam  Vitals reviewed.   HENT:      Head: Normocephalic.   Cardiovascular:      Rate and Rhythm: Normal rate and regular rhythm.   Pulmonary:      Effort: Pulmonary effort is normal.      Breath sounds: Normal breath sounds.   Neurological:      General: No focal deficit  present.      Mental Status: She is alert.   Psychiatric:         Mood and Affect: Mood normal.         Assessment/Plan   Problem List Items Addressed This Visit       Mixed connective tissue disease (Multi)    Inflammatory polyarthropathy (Multi) - Primary     Other Visit Diagnoses       Cervical transverse process fracture, initial encounter (Multi)        Injury of brachial plexus, initial encounter                 Call concerns. Routine followup this fall as scheduled.  Continue therapy and appt with specialists.       Mirtha Benjamin MD

## 2024-06-05 NOTE — PATIENT INSTRUCTIONS

## 2024-06-10 ENCOUNTER — PHARMACY VISIT (OUTPATIENT)
Dept: PHARMACY | Facility: CLINIC | Age: 74
End: 2024-06-10
Payer: COMMERCIAL

## 2024-06-10 PROCEDURE — RXMED WILLOW AMBULATORY MEDICATION CHARGE

## 2024-06-10 RX ORDER — TRAMADOL HYDROCHLORIDE 50 MG/1
TABLET ORAL
Qty: 15 TABLET | Refills: 0 | OUTPATIENT
Start: 2024-06-10

## 2024-06-25 ENCOUNTER — TELEPHONE (OUTPATIENT)
Dept: PRIMARY CARE | Facility: CLINIC | Age: 74
End: 2024-06-25
Payer: MEDICARE
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
## 2024-06-25 NOTE — TELEPHONE ENCOUNTER
There are multiple things that can cause the leg swelling, with a normal bp I would recommend an apt first to discuss further.

## 2024-06-26 ENCOUNTER — LAB (OUTPATIENT)
Dept: LAB | Facility: LAB | Age: 74
End: 2024-06-26
Payer: MEDICARE

## 2024-06-26 DIAGNOSIS — E04.2 MULTIPLE THYROID NODULES: ICD-10-CM

## 2024-06-26 DIAGNOSIS — C90.00 MULTIPLE MYELOMA NOT HAVING ACHIEVED REMISSION (MULTI): ICD-10-CM

## 2024-06-26 LAB
ALBUMIN SERPL BCP-MCNC: 3.8 G/DL (ref 3.4–5)
ALP SERPL-CCNC: 82 U/L (ref 33–136)
ALT SERPL W P-5'-P-CCNC: 12 U/L (ref 7–45)
ANION GAP SERPL CALC-SCNC: 11 MMOL/L (ref 10–20)
AST SERPL W P-5'-P-CCNC: 14 U/L (ref 9–39)
BASOPHILS # BLD AUTO: 0.04 X10*3/UL (ref 0–0.1)
BASOPHILS NFR BLD AUTO: 1.6 %
BILIRUB SERPL-MCNC: 0.8 MG/DL (ref 0–1.2)
BUN SERPL-MCNC: 22 MG/DL (ref 6–23)
CALCIUM SERPL-MCNC: 9 MG/DL (ref 8.6–10.3)
CHLORIDE SERPL-SCNC: 105 MMOL/L (ref 98–107)
CO2 SERPL-SCNC: 25 MMOL/L (ref 21–32)
CREAT SERPL-MCNC: 0.85 MG/DL (ref 0.5–1.05)
EGFRCR SERPLBLD CKD-EPI 2021: 73 ML/MIN/1.73M*2
EOSINOPHIL # BLD AUTO: 0.09 X10*3/UL (ref 0–0.4)
EOSINOPHIL NFR BLD AUTO: 3.7 %
ERYTHROCYTE [DISTWIDTH] IN BLOOD BY AUTOMATED COUNT: 15.8 % (ref 11.5–14.5)
GLUCOSE SERPL-MCNC: 88 MG/DL (ref 74–99)
HCT VFR BLD AUTO: 31.8 % (ref 36–46)
HGB BLD-MCNC: 9.6 G/DL (ref 12–16)
IMM GRANULOCYTES # BLD AUTO: 0 X10*3/UL (ref 0–0.5)
IMM GRANULOCYTES NFR BLD AUTO: 0 % (ref 0–0.9)
LDH SERPL L TO P-CCNC: 134 U/L (ref 84–246)
LYMPHOCYTES # BLD AUTO: 0.78 X10*3/UL (ref 0.8–3)
LYMPHOCYTES NFR BLD AUTO: 31.7 %
MCH RBC QN AUTO: 29.4 PG (ref 26–34)
MCHC RBC AUTO-ENTMCNC: 30.2 G/DL (ref 32–36)
MCV RBC AUTO: 97 FL (ref 80–100)
MONOCYTES # BLD AUTO: 0.25 X10*3/UL (ref 0.05–0.8)
MONOCYTES NFR BLD AUTO: 10.2 %
NEUTROPHILS # BLD AUTO: 1.3 X10*3/UL (ref 1.6–5.5)
NEUTROPHILS NFR BLD AUTO: 52.8 %
NRBC BLD-RTO: 0 /100 WBCS (ref 0–0)
PLATELET # BLD AUTO: 216 X10*3/UL (ref 150–450)
POTASSIUM SERPL-SCNC: 4.4 MMOL/L (ref 3.5–5.3)
PROT SERPL-MCNC: 6.9 G/DL (ref 6.4–8.2)
RBC # BLD AUTO: 3.27 X10*6/UL (ref 4–5.2)
SODIUM SERPL-SCNC: 137 MMOL/L (ref 136–145)
TSH SERPL-ACNC: 1.3 MIU/L (ref 0.44–3.98)
WBC # BLD AUTO: 2.5 X10*3/UL (ref 4.4–11.3)

## 2024-06-26 PROCEDURE — 83615 LACTATE (LD) (LDH) ENZYME: CPT

## 2024-06-26 PROCEDURE — 84165 PROTEIN E-PHORESIS SERUM: CPT

## 2024-06-26 PROCEDURE — 84155 ASSAY OF PROTEIN SERUM: CPT

## 2024-06-26 PROCEDURE — 80053 COMPREHEN METABOLIC PANEL: CPT

## 2024-06-26 PROCEDURE — 86334 IMMUNOFIX E-PHORESIS SERUM: CPT

## 2024-06-26 PROCEDURE — 85025 COMPLETE CBC W/AUTO DIFF WBC: CPT

## 2024-06-26 PROCEDURE — 83521 IG LIGHT CHAINS FREE EACH: CPT

## 2024-06-26 PROCEDURE — 82784 ASSAY IGA/IGD/IGG/IGM EACH: CPT

## 2024-06-26 PROCEDURE — 82232 ASSAY OF BETA-2 PROTEIN: CPT

## 2024-06-26 PROCEDURE — 84443 ASSAY THYROID STIM HORMONE: CPT

## 2024-06-26 PROCEDURE — 36415 COLL VENOUS BLD VENIPUNCTURE: CPT

## 2024-06-27 ENCOUNTER — OFFICE VISIT (OUTPATIENT)
Dept: PRIMARY CARE | Facility: CLINIC | Age: 74
End: 2024-06-27
Payer: MEDICARE

## 2024-06-27 ENCOUNTER — PHARMACY VISIT (OUTPATIENT)
Dept: PHARMACY | Facility: CLINIC | Age: 74
End: 2024-06-27
Payer: COMMERCIAL

## 2024-06-27 VITALS
WEIGHT: 194 LBS | HEART RATE: 88 BPM | BODY MASS INDEX: 29.4 KG/M2 | HEIGHT: 68 IN | DIASTOLIC BLOOD PRESSURE: 90 MMHG | SYSTOLIC BLOOD PRESSURE: 146 MMHG

## 2024-06-27 DIAGNOSIS — D64.9 ANEMIA, UNSPECIFIED TYPE: ICD-10-CM

## 2024-06-27 DIAGNOSIS — I10 PRIMARY HYPERTENSION: Primary | ICD-10-CM

## 2024-06-27 DIAGNOSIS — I87.2 VENOUS INSUFFICIENCY OF BOTH LOWER EXTREMITIES: ICD-10-CM

## 2024-06-27 LAB
B2 MICROGLOB SERPL-MCNC: 4.9 MG/L (ref 0.7–2.2)
IGA SERPL-MCNC: 59 MG/DL (ref 70–400)
IGG SERPL-MCNC: 2160 MG/DL (ref 700–1600)
IGM SERPL-MCNC: 54 MG/DL (ref 40–230)
KAPPA LC SERPL-MCNC: 3.95 MG/DL (ref 0.33–1.94)
KAPPA LC/LAMBDA SER: 2.8 {RATIO} (ref 0.26–1.65)
LAMBDA LC SERPL-MCNC: 1.41 MG/DL (ref 0.57–2.63)
PROT SERPL-MCNC: 7.2 G/DL (ref 6.4–8.2)

## 2024-06-27 PROCEDURE — 1159F MED LIST DOCD IN RCRD: CPT | Performed by: FAMILY MEDICINE

## 2024-06-27 PROCEDURE — 1123F ACP DISCUSS/DSCN MKR DOCD: CPT | Performed by: FAMILY MEDICINE

## 2024-06-27 PROCEDURE — 1160F RVW MEDS BY RX/DR IN RCRD: CPT | Performed by: FAMILY MEDICINE

## 2024-06-27 PROCEDURE — 1170F FXNL STATUS ASSESSED: CPT | Performed by: FAMILY MEDICINE

## 2024-06-27 PROCEDURE — 99214 OFFICE O/P EST MOD 30 MIN: CPT | Performed by: FAMILY MEDICINE

## 2024-06-27 PROCEDURE — 1036F TOBACCO NON-USER: CPT | Performed by: FAMILY MEDICINE

## 2024-06-27 PROCEDURE — 3077F SYST BP >= 140 MM HG: CPT | Performed by: FAMILY MEDICINE

## 2024-06-27 PROCEDURE — 3080F DIAST BP >= 90 MM HG: CPT | Performed by: FAMILY MEDICINE

## 2024-06-27 PROCEDURE — RXMED WILLOW AMBULATORY MEDICATION CHARGE

## 2024-06-27 PROCEDURE — G0439 PPPS, SUBSEQ VISIT: HCPCS | Performed by: FAMILY MEDICINE

## 2024-06-27 RX ORDER — VALSARTAN 80 MG/1
80 TABLET ORAL DAILY
Qty: 30 TABLET | Refills: 3 | Status: SHIPPED | OUTPATIENT
Start: 2024-06-27

## 2024-06-27 RX ORDER — HYDROCHLOROTHIAZIDE 25 MG/1
25 TABLET ORAL DAILY
Qty: 30 TABLET | Refills: 3 | Status: SHIPPED | OUTPATIENT
Start: 2024-06-27

## 2024-06-27 ASSESSMENT — ACTIVITIES OF DAILY LIVING (ADL)
BATHING: INDEPENDENT
DOING_HOUSEWORK: NEEDS ASSISTANCE
GROCERY_SHOPPING: NEEDS ASSISTANCE
TAKING_MEDICATION: INDEPENDENT
DRESSING: INDEPENDENT
MANAGING_FINANCES: INDEPENDENT

## 2024-06-27 ASSESSMENT — PATIENT HEALTH QUESTIONNAIRE - PHQ9
SUM OF ALL RESPONSES TO PHQ9 QUESTIONS 1 AND 2: 0
2. FEELING DOWN, DEPRESSED OR HOPELESS: NOT AT ALL
1. LITTLE INTEREST OR PLEASURE IN DOING THINGS: NOT AT ALL

## 2024-06-27 NOTE — PROGRESS NOTES
Subjective  Mirella Renae is a 73 y.o. female who presents for Foot Swelling.  HPI  See previous note.  Here with spouse.  While hospitalized, diovan/hydrochlorothiazide 160/12.5 was held due to hypertension.  Since home bp has gradually been creeping back up. Has had swelling in hands and ankles since hospital stay worse at end of day, better/gone in morning.  Denies dyspnea, chest pain, orthopnea or pnd.  Denies calf pain or swelling.   Also has been having restless leg worsening, reviewed labs, hb has dropped a bit.  Has followup with hem/onc for same pending, recommend   Review of Systems   All other systems reviewed and are negative.    Current Outpatient Medications:     acetaminophen (Tylenol) 500 mg tablet, Take 2 tablets (1,000 mg) by mouth every 6 hours if needed for mild pain (1 - 3)., Disp: , Rfl:     cyanocobalamin (Vitamin B-12) 1,000 mcg tablet, Take 1 tablet (1,000 mcg) by mouth once daily., Disp: , Rfl:     ergocalciferol (Vitamin D-2) 1.25 MG (98640 UT) capsule, Take 1 capsule (50,000 Units) by mouth every 7 days., Disp: , Rfl:     gabapentin (Neurontin) 400 mg capsule, Take 2 capsules (800 mg) by mouth 3 times a day., Disp: , Rfl:     hydroxychloroquine (PlaqueniL) 200 mg tablet, Take 1 Tablet(s) Oral every day, Disp: 90 tablet, Rfl: 1    magnesium oxide (Mag-Ox) 400 mg (241.3 mg magnesium) tablet, Take 1 tablet (400 mg) by mouth once daily., Disp: , Rfl:     metoprolol tartrate (Lopressor) 25 mg tablet, TAKE 1 TABLET TWICE DAILY., Disp: 180 tablet, Rfl: 3    multivitamin tablet, Take 1 tablet by mouth once daily., Disp: , Rfl:     naloxone (Narcan) 4 mg/0.1 mL nasal spray, Administer 1 spray into one nostril for known or suspected opioid overdose. If patient worsens or does not respond, may repeat in 2-3 minutes. ., Disp: , Rfl:     pantoprazole (ProtoNix) 40 mg EC tablet, Take 1 tablet (40 mg) by mouth once daily., Disp: , Rfl:     rivaroxaban (Xarelto) 20 mg tablet, TAKE ONE TABLET BY  MOUTH DAILY, Disp: 30 tablet, Rfl: 11    traMADol (Ultram) 50 mg tablet, Take 1 (one) tablet (50 mg total) by mouth every 8 (eight) hours as needed for pain ., Disp: 15 tablet, Rfl: 0    hydroCHLOROthiazide (HYDRODiuril) 25 mg tablet, Take 1 tablet (25 mg) by mouth once daily., Disp: 30 tablet, Rfl: 3    valsartan (Diovan) 80 mg tablet, Take 1 tablet (80 mg) by mouth once daily., Disp: 30 tablet, Rfl: 3     .  Patient Active Problem List   Diagnosis    Anxiety disorder    Hypertension    Mixed connective tissue disease (Multi)    Paroxysmal atrial fibrillation (Multi)    Smoldering multiple myeloma    Multiple myeloma not having achieved remission (Multi)    Other restrictive cardiomyopathy (Multi)    Inflammatory polyarthropathy (Multi)       Objective     Visit Vitals  /90 (BP Location: Right arm, Patient Position: Sitting)   Pulse 88      Physical Exam  Vitals reviewed.   Constitutional:       General: She is not in acute distress.  HENT:      Head: Normocephalic.   Neck:      Comments: No jvd  Cardiovascular:      Rate and Rhythm: Normal rate and regular rhythm.   Pulmonary:      Effort: Pulmonary effort is normal.      Breath sounds: Normal breath sounds.   Musculoskeletal:      Comments: Trace edema bilateral lower legs, no calf swelling     Neurological:      Mental Status: She is alert.         Assessment/Plan   Problem List Items Addressed This Visit       Hypertension - Primary    Relevant Medications    valsartan (Diovan) 80 mg tablet    hydroCHLOROthiazide (HYDRODiuril) 25 mg tablet    Other Relevant Orders    Basic metabolic panel     Other Visit Diagnoses       Venous insufficiency of both lower extremities        Anemia, unspecified type                 Recommend resume antihypertensive, advised this is not the same dose she was taking , fup in a few weeks, bmp prior, call concerns.      Mirtha Benjamin MD

## 2024-06-30 LAB
ALBUMIN: 3.7 G/DL (ref 3.4–5)
ALPHA 1 GLOBULIN: 0.4 G/DL (ref 0.2–0.6)
ALPHA 2 GLOBULIN: 0.7 G/DL (ref 0.4–1.1)
BETA GLOBULIN: 0.6 G/DL (ref 0.5–1.2)
GAMMA GLOBULIN: 1.9 G/DL (ref 0.5–1.4)
IMMUNOFIXATION COMMENT: ABNORMAL
M-PROTEIN 1: 1.2 G/DL
PATH REVIEW - SERUM IMMUNOFIXATION: ABNORMAL
PATH REVIEW-SERUM PROTEIN ELECTROPHORESIS: ABNORMAL
PROTEIN ELECTROPHORESIS COMMENT: ABNORMAL

## 2024-07-01 ENCOUNTER — TELEMEDICINE (OUTPATIENT)
Dept: HEMATOLOGY/ONCOLOGY | Facility: CLINIC | Age: 74
End: 2024-07-01
Payer: MEDICARE

## 2024-07-01 DIAGNOSIS — D47.2 SMOLDERING MULTIPLE MYELOMA: Primary | ICD-10-CM

## 2024-07-01 DIAGNOSIS — C90.00 MULTIPLE MYELOMA NOT HAVING ACHIEVED REMISSION (MULTI): ICD-10-CM

## 2024-07-01 DIAGNOSIS — E04.2 MULTIPLE THYROID NODULES: ICD-10-CM

## 2024-07-01 LAB — PROT SERPL-MCNC: 7.4 G/DL (ref 6.4–8.2)

## 2024-07-01 PROCEDURE — 99214 OFFICE O/P EST MOD 30 MIN: CPT | Performed by: NURSE PRACTITIONER
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
## 2024-07-02 ENCOUNTER — DOCUMENTATION (OUTPATIENT)
Dept: HEMATOLOGY/ONCOLOGY | Facility: CLINIC | Age: 74
End: 2024-07-02
Payer: MEDICARE

## 2024-07-03 ENCOUNTER — TELEPHONE (OUTPATIENT)
Dept: HEMATOLOGY/ONCOLOGY | Facility: CLINIC | Age: 74
End: 2024-07-03

## 2024-07-03 ENCOUNTER — LAB (OUTPATIENT)
Dept: LAB | Facility: LAB | Age: 74
End: 2024-07-03
Payer: MEDICARE

## 2024-07-03 DIAGNOSIS — D47.2 SMOLDERING MULTIPLE MYELOMA: ICD-10-CM

## 2024-07-03 DIAGNOSIS — C90.00 MULTIPLE MYELOMA NOT HAVING ACHIEVED REMISSION (MULTI): ICD-10-CM

## 2024-07-03 LAB
ALBUMIN SERPL BCP-MCNC: 4.1 G/DL (ref 3.4–5)
ALP SERPL-CCNC: 74 U/L (ref 33–136)
ALT SERPL W P-5'-P-CCNC: 10 U/L (ref 7–45)
ANION GAP SERPL CALC-SCNC: 11 MMOL/L (ref 10–20)
AST SERPL W P-5'-P-CCNC: 12 U/L (ref 9–39)
BASOPHILS # BLD AUTO: 0.02 X10*3/UL (ref 0–0.1)
BASOPHILS NFR BLD AUTO: 0.7 %
BILIRUB SERPL-MCNC: 0.8 MG/DL (ref 0–1.2)
BUN SERPL-MCNC: 29 MG/DL (ref 6–23)
CALCIUM SERPL-MCNC: 9.4 MG/DL (ref 8.6–10.3)
CHLORIDE SERPL-SCNC: 102 MMOL/L (ref 98–107)
CO2 SERPL-SCNC: 27 MMOL/L (ref 21–32)
CREAT SERPL-MCNC: 0.93 MG/DL (ref 0.5–1.05)
EGFRCR SERPLBLD CKD-EPI 2021: 65 ML/MIN/1.73M*2
EOSINOPHIL # BLD AUTO: 0.1 X10*3/UL (ref 0–0.4)
EOSINOPHIL NFR BLD AUTO: 3.7 %
ERYTHROCYTE [DISTWIDTH] IN BLOOD BY AUTOMATED COUNT: 16 % (ref 11.5–14.5)
FERRITIN SERPL-MCNC: 383 NG/ML (ref 8–150)
GLUCOSE SERPL-MCNC: 95 MG/DL (ref 74–99)
HCT VFR BLD AUTO: 34.1 % (ref 36–46)
HGB BLD-MCNC: 10.5 G/DL (ref 12–16)
IMM GRANULOCYTES # BLD AUTO: 0.01 X10*3/UL (ref 0–0.5)
IMM GRANULOCYTES NFR BLD AUTO: 0.4 % (ref 0–0.9)
IRON SATN MFR SERPL: 19 % (ref 25–45)
IRON SERPL-MCNC: 61 UG/DL (ref 35–150)
LYMPHOCYTES # BLD AUTO: 0.91 X10*3/UL (ref 0.8–3)
LYMPHOCYTES NFR BLD AUTO: 33.3 %
MCH RBC QN AUTO: 29.6 PG (ref 26–34)
MCHC RBC AUTO-ENTMCNC: 30.8 G/DL (ref 32–36)
MCV RBC AUTO: 96 FL (ref 80–100)
MONOCYTES # BLD AUTO: 0.4 X10*3/UL (ref 0.05–0.8)
MONOCYTES NFR BLD AUTO: 14.7 %
NEUTROPHILS # BLD AUTO: 1.29 X10*3/UL (ref 1.6–5.5)
NEUTROPHILS NFR BLD AUTO: 47.2 %
NRBC BLD-RTO: 0 /100 WBCS (ref 0–0)
PLATELET # BLD AUTO: 231 X10*3/UL (ref 150–450)
POTASSIUM SERPL-SCNC: 4 MMOL/L (ref 3.5–5.3)
PROT SERPL-MCNC: 7.1 G/DL (ref 6.4–8.2)
RBC # BLD AUTO: 3.55 X10*6/UL (ref 4–5.2)
SODIUM SERPL-SCNC: 136 MMOL/L (ref 136–145)
TIBC SERPL-MCNC: 313 UG/DL (ref 240–445)
UIBC SERPL-MCNC: 252 UG/DL (ref 110–370)
VIT B12 SERPL-MCNC: 328 PG/ML (ref 211–911)
WBC # BLD AUTO: 2.7 X10*3/UL (ref 4.4–11.3)

## 2024-07-03 PROCEDURE — 82728 ASSAY OF FERRITIN: CPT

## 2024-07-03 PROCEDURE — 83521 IG LIGHT CHAINS FREE EACH: CPT

## 2024-07-03 PROCEDURE — 85810 BLOOD VISCOSITY EXAMINATION: CPT

## 2024-07-03 PROCEDURE — 83540 ASSAY OF IRON: CPT

## 2024-07-03 PROCEDURE — 86334 IMMUNOFIX E-PHORESIS SERUM: CPT

## 2024-07-03 PROCEDURE — 82784 ASSAY IGA/IGD/IGG/IGM EACH: CPT

## 2024-07-03 PROCEDURE — 83550 IRON BINDING TEST: CPT

## 2024-07-03 PROCEDURE — 80053 COMPREHEN METABOLIC PANEL: CPT

## 2024-07-03 PROCEDURE — 82607 VITAMIN B-12: CPT

## 2024-07-03 PROCEDURE — 84165 PROTEIN E-PHORESIS SERUM: CPT

## 2024-07-03 PROCEDURE — 84155 ASSAY OF PROTEIN SERUM: CPT

## 2024-07-03 PROCEDURE — 36415 COLL VENOUS BLD VENIPUNCTURE: CPT

## 2024-07-03 PROCEDURE — 85025 COMPLETE CBC W/AUTO DIFF WBC: CPT

## 2024-07-04 LAB
IGA SERPL-MCNC: 62 MG/DL (ref 70–400)
IGG SERPL-MCNC: 2160 MG/DL (ref 700–1600)
IGM SERPL-MCNC: 60 MG/DL (ref 40–230)
PROT SERPL-MCNC: 7.3 G/DL (ref 6.4–8.2)

## 2024-07-05 ENCOUNTER — PHARMACY VISIT (OUTPATIENT)
Dept: PHARMACY | Facility: CLINIC | Age: 74
End: 2024-07-05
Payer: COMMERCIAL

## 2024-07-05 ENCOUNTER — TELEPHONE (OUTPATIENT)
Dept: PRIMARY CARE | Facility: CLINIC | Age: 74
End: 2024-07-05
Payer: MEDICARE

## 2024-07-05 LAB
HETEROPH AB SER QL: 1.87 CP (ref 1.5–1.8)
KAPPA LC SERPL-MCNC: 4.33 MG/DL (ref 0.33–1.94)
KAPPA LC/LAMBDA SER: 3.01 {RATIO} (ref 0.26–1.65)
LAMBDA LC SERPL-MCNC: 1.44 MG/DL (ref 0.57–2.63)

## 2024-07-05 PROCEDURE — RXMED WILLOW AMBULATORY MEDICATION CHARGE

## 2024-07-05 RX ORDER — PREGABALIN 75 MG/1
CAPSULE ORAL
Qty: 60 CAPSULE | Refills: 0 | OUTPATIENT
Start: 2024-07-05

## 2024-07-05 NOTE — TELEPHONE ENCOUNTER
Magnesium is over the counter, I would stay on that for now.  I show she still has refills at [facility] both from her cardiologist on the xarelto and metoprolol, let me know if she finds pharmacy information is different.  Pantoprozole, can be discontinued, and will resume if develops any stomach/heartburn symptoms

## 2024-07-05 NOTE — TELEPHONE ENCOUNTER
LM stating she is running out of meds given by hospital; wonders if she needs to stay on them and if so will need refills.    Metoprolol  Magnesium  Xaralto  Pantoprazole    Please advise, thanks.

## 2024-07-08 LAB
ALBUMIN: 3.8 G/DL (ref 3.4–5)
ALPHA 1 GLOBULIN: 0.4 G/DL (ref 0.2–0.6)
ALPHA 2 GLOBULIN: 0.6 G/DL (ref 0.4–1.1)
BETA GLOBULIN: 0.6 G/DL (ref 0.5–1.2)
GAMMA GLOBULIN: 1.9 G/DL (ref 0.5–1.4)
IMMUNOFIXATION COMMENT: ABNORMAL
M-PROTEIN 1: 1.2 G/DL
PATH REVIEW - SERUM IMMUNOFIXATION: ABNORMAL
PATH REVIEW-SERUM PROTEIN ELECTROPHORESIS: ABNORMAL
PROTEIN ELECTROPHORESIS COMMENT: ABNORMAL

## 2024-07-23 ENCOUNTER — TELEPHONE (OUTPATIENT)
Dept: HEMATOLOGY/ONCOLOGY | Facility: CLINIC | Age: 74
End: 2024-07-23
Payer: MEDICARE

## 2024-07-23 NOTE — TELEPHONE ENCOUNTER
Kacie - from [facility]- OT is requesting use of electrical stim for pt .   Please reach out at [phone] to discuss. If approved/denied please leave a detailed message- this is a secure line.

## 2024-07-24 NOTE — TELEPHONE ENCOUNTER
Per ANALIA Lyons PAC We see patient for smoldering myeloma.  There would be no contraindication in that scenario.   Message left on identified, secured VM with above information for Kacie

## 2024-07-27 ENCOUNTER — PHARMACY VISIT (OUTPATIENT)
Dept: PHARMACY | Facility: CLINIC | Age: 74
End: 2024-07-27
Payer: COMMERCIAL

## 2024-07-27 PROCEDURE — RXMED WILLOW AMBULATORY MEDICATION CHARGE

## 2024-08-05 ENCOUNTER — PHARMACY VISIT (OUTPATIENT)
Dept: PHARMACY | Facility: CLINIC | Age: 74
End: 2024-08-05
Payer: COMMERCIAL

## 2024-08-05 ENCOUNTER — TELEPHONE (OUTPATIENT)
Age: 74
End: 2024-08-05

## 2024-08-05 ENCOUNTER — OFFICE VISIT (OUTPATIENT)
Age: 74
End: 2024-08-05
Payer: MEDICARE

## 2024-08-05 VITALS
HEIGHT: 68 IN | HEART RATE: 68 BPM | WEIGHT: 184 LBS | SYSTOLIC BLOOD PRESSURE: 110 MMHG | DIASTOLIC BLOOD PRESSURE: 66 MMHG | BODY MASS INDEX: 27.89 KG/M2

## 2024-08-05 DIAGNOSIS — S14.3XXS BRACHIAL PLEXUS INJURY, LEFT, SEQUELA: Primary | ICD-10-CM

## 2024-08-05 DIAGNOSIS — M35.1 MIXED CONNECTIVE TISSUE DISEASE (MULTI): ICD-10-CM

## 2024-08-05 DIAGNOSIS — S14.3XXA INJURY OF BRACHIAL PLEXUS, INITIAL ENCOUNTER: Primary | ICD-10-CM

## 2024-08-05 PROCEDURE — RXMED WILLOW AMBULATORY MEDICATION CHARGE

## 2024-08-05 RX ORDER — PREDNISONE 20 MG/1
40 TABLET ORAL DAILY
Qty: 6 TABLET | Refills: 0 | Status: SHIPPED | OUTPATIENT
Start: 2024-08-05 | End: 2024-08-08

## 2024-08-05 RX ORDER — PREGABALIN 75 MG/1
CAPSULE ORAL
Qty: 60 CAPSULE | Refills: 2 | Status: SHIPPED | OUTPATIENT
Start: 2024-08-05

## 2024-08-05 NOTE — PROGRESS NOTES
Subjective  Mirella Renae is a 73 y.o. female who presents for Arm Swelling.  HPI  Left brachial plexus injury, saw neuro surgery yesterday. PT made her a compression sleeve to start wearing, hasn't had it long  Left hand swelling for a week or so,neuro surg away.  On eliquis  HTN< stable on new regimen.  Review of Systems   All other systems reviewed and are negative.  .    Objective     Visit Vitals  /66 (BP Location: Right arm, Patient Position: Sitting)   Pulse 68      Physical Exam  Vitals reviewed.   HENT:      Head: Normocephalic.   Pulmonary:      Effort: Pulmonary effort is normal.   Musculoskeletal:      Comments: Baseline left arm weakness, mild/moderate edema dorsum of left hand, good color,warm, good cap refill,  minimal upper arm swelling noted   Neurological:      Mental Status: She is alert.         Assessment/Plan   Problem List Items Addressed This Visit    None  Visit Diagnoses       Brachial plexus injury, left, sequela    -  Primary    Relevant Medications    predniSONE (Deltasone) 20 mg tablet             I think the swelling is related to the chronic injury, theres no evidence of infection and she is on anticoagulation.  Can try short course oral steroids for symptom relief with close follow up.       Mirtha Benjamin MD

## 2024-08-05 NOTE — TELEPHONE ENCOUNTER
States she is having swelling in left arm and hand for about a week. States this is the arm she injured in her fall. Wonders if NEELIMA would want her to increase her lasix or if she has another suggestion? Also needs refill on lyrica; was started on it by Dr. Hand at [facility] but was told any refills would need to come from PCP. The refill will be attached to his encounter, fyi.

## 2024-08-26 ENCOUNTER — PHARMACY VISIT (OUTPATIENT)
Dept: PHARMACY | Facility: CLINIC | Age: 74
End: 2024-08-26
Payer: COMMERCIAL

## 2024-08-26 PROCEDURE — RXMED WILLOW AMBULATORY MEDICATION CHARGE

## 2024-08-27 PROCEDURE — RXMED WILLOW AMBULATORY MEDICATION CHARGE

## 2024-08-31 ENCOUNTER — PHARMACY VISIT (OUTPATIENT)
Dept: PHARMACY | Facility: CLINIC | Age: 74
End: 2024-08-31
Payer: COMMERCIAL

## 2024-09-04 ENCOUNTER — LAB (OUTPATIENT)
Dept: LAB | Facility: LAB | Age: 74
End: 2024-09-04
Payer: MEDICARE

## 2024-09-04 DIAGNOSIS — Z79.899 OTHER LONG TERM (CURRENT) DRUG THERAPY: ICD-10-CM

## 2024-09-04 DIAGNOSIS — M06.4 INFLAMMATORY POLYARTHROPATHY (MULTI): Primary | ICD-10-CM

## 2024-09-04 DIAGNOSIS — I10 PRIMARY HYPERTENSION: ICD-10-CM

## 2024-09-04 DIAGNOSIS — M35.1 OTHER OVERLAP SYNDROMES (MULTI): ICD-10-CM

## 2024-09-04 LAB
ALBUMIN SERPL BCP-MCNC: 4 G/DL (ref 3.4–5)
ALP SERPL-CCNC: 64 U/L (ref 33–136)
ALT SERPL W P-5'-P-CCNC: 7 U/L (ref 7–45)
ANION GAP SERPL CALC-SCNC: 11 MMOL/L (ref 10–20)
AST SERPL W P-5'-P-CCNC: 10 U/L (ref 9–39)
BASOPHILS # BLD AUTO: 0.02 X10*3/UL (ref 0–0.1)
BASOPHILS NFR BLD AUTO: 0.9 %
BILIRUB SERPL-MCNC: 0.6 MG/DL (ref 0–1.2)
BUN SERPL-MCNC: 25 MG/DL (ref 6–23)
CALCIUM SERPL-MCNC: 9.3 MG/DL (ref 8.6–10.3)
CHLORIDE SERPL-SCNC: 104 MMOL/L (ref 98–107)
CHOLEST SERPL-MCNC: 121 MG/DL (ref 0–199)
CHOLESTEROL/HDL RATIO: 3.1
CO2 SERPL-SCNC: 27 MMOL/L (ref 21–32)
CREAT SERPL-MCNC: 1.01 MG/DL (ref 0.5–1.05)
EGFRCR SERPLBLD CKD-EPI 2021: 59 ML/MIN/1.73M*2
EOSINOPHIL # BLD AUTO: 0.08 X10*3/UL (ref 0–0.4)
EOSINOPHIL NFR BLD AUTO: 3.7 %
ERYTHROCYTE [DISTWIDTH] IN BLOOD BY AUTOMATED COUNT: 12.8 % (ref 11.5–14.5)
GLUCOSE SERPL-MCNC: 90 MG/DL (ref 74–99)
HCT VFR BLD AUTO: 37.9 % (ref 36–46)
HDLC SERPL-MCNC: 39 MG/DL
HGB BLD-MCNC: 12 G/DL (ref 12–16)
IMM GRANULOCYTES # BLD AUTO: 0 X10*3/UL (ref 0–0.5)
IMM GRANULOCYTES NFR BLD AUTO: 0 % (ref 0–0.9)
LDLC SERPL CALC-MCNC: 62 MG/DL
LYMPHOCYTES # BLD AUTO: 0.98 X10*3/UL (ref 0.8–3)
LYMPHOCYTES NFR BLD AUTO: 44.7 %
MCH RBC QN AUTO: 30 PG (ref 26–34)
MCHC RBC AUTO-ENTMCNC: 31.7 G/DL (ref 32–36)
MCV RBC AUTO: 95 FL (ref 80–100)
MONOCYTES # BLD AUTO: 0.31 X10*3/UL (ref 0.05–0.8)
MONOCYTES NFR BLD AUTO: 14.2 %
NEUTROPHILS # BLD AUTO: 0.8 X10*3/UL (ref 1.6–5.5)
NEUTROPHILS NFR BLD AUTO: 36.5 %
NON HDL CHOLESTEROL: 82 MG/DL (ref 0–149)
NRBC BLD-RTO: 0 /100 WBCS (ref 0–0)
PLATELET # BLD AUTO: 170 X10*3/UL (ref 150–450)
POTASSIUM SERPL-SCNC: 4.2 MMOL/L (ref 3.5–5.3)
PROT SERPL-MCNC: 7.1 G/DL (ref 6.4–8.2)
RBC # BLD AUTO: 4 X10*6/UL (ref 4–5.2)
SODIUM SERPL-SCNC: 138 MMOL/L (ref 136–145)
TRIGL SERPL-MCNC: 98 MG/DL (ref 0–149)
TSH SERPL-ACNC: 1.98 MIU/L (ref 0.44–3.98)
VIT B12 SERPL-MCNC: 848 PG/ML (ref 211–911)
VLDL: 20 MG/DL (ref 0–40)
WBC # BLD AUTO: 2.3 X10*3/UL (ref 4.4–11.3)

## 2024-09-04 PROCEDURE — 85025 COMPLETE CBC W/AUTO DIFF WBC: CPT

## 2024-09-04 PROCEDURE — 84443 ASSAY THYROID STIM HORMONE: CPT

## 2024-09-04 PROCEDURE — 82607 VITAMIN B-12: CPT

## 2024-09-04 PROCEDURE — 80061 LIPID PANEL: CPT

## 2024-09-04 PROCEDURE — 36415 COLL VENOUS BLD VENIPUNCTURE: CPT

## 2024-09-04 PROCEDURE — 80053 COMPREHEN METABOLIC PANEL: CPT

## 2024-09-17 ENCOUNTER — LAB (OUTPATIENT)
Dept: LAB | Facility: LAB | Age: 74
End: 2024-09-17
Payer: MEDICARE

## 2024-09-17 DIAGNOSIS — Z79.899 OTHER LONG TERM (CURRENT) DRUG THERAPY: ICD-10-CM

## 2024-09-17 DIAGNOSIS — M35.1 OTHER OVERLAP SYNDROMES: ICD-10-CM

## 2024-09-17 DIAGNOSIS — M06.4 INFLAMMATORY POLYARTHROPATHY (MULTI): Primary | ICD-10-CM

## 2024-09-17 LAB
BASOPHILS # BLD AUTO: 0.03 X10*3/UL (ref 0–0.1)
BASOPHILS NFR BLD AUTO: 1.1 %
EOSINOPHIL # BLD AUTO: 0.08 X10*3/UL (ref 0–0.4)
EOSINOPHIL NFR BLD AUTO: 3 %
ERYTHROCYTE [DISTWIDTH] IN BLOOD BY AUTOMATED COUNT: 12.9 % (ref 11.5–14.5)
HCT VFR BLD AUTO: 37.3 % (ref 36–46)
HGB BLD-MCNC: 11.7 G/DL (ref 12–16)
IMM GRANULOCYTES # BLD AUTO: 0 X10*3/UL (ref 0–0.5)
IMM GRANULOCYTES NFR BLD AUTO: 0 % (ref 0–0.9)
LYMPHOCYTES # BLD AUTO: 1.05 X10*3/UL (ref 0.8–3)
LYMPHOCYTES NFR BLD AUTO: 38.7 %
MCH RBC QN AUTO: 29.9 PG (ref 26–34)
MCHC RBC AUTO-ENTMCNC: 31.4 G/DL (ref 32–36)
MCV RBC AUTO: 95 FL (ref 80–100)
MONOCYTES # BLD AUTO: 0.34 X10*3/UL (ref 0.05–0.8)
MONOCYTES NFR BLD AUTO: 12.5 %
NEUTROPHILS # BLD AUTO: 1.21 X10*3/UL (ref 1.6–5.5)
NEUTROPHILS NFR BLD AUTO: 44.7 %
NRBC BLD-RTO: 0 /100 WBCS (ref 0–0)
PLATELET # BLD AUTO: 157 X10*3/UL (ref 150–450)
RBC # BLD AUTO: 3.91 X10*6/UL (ref 4–5.2)
WBC # BLD AUTO: 2.7 X10*3/UL (ref 4.4–11.3)

## 2024-09-17 PROCEDURE — 85025 COMPLETE CBC W/AUTO DIFF WBC: CPT

## 2024-09-17 PROCEDURE — 36415 COLL VENOUS BLD VENIPUNCTURE: CPT

## 2024-09-26 ENCOUNTER — PHARMACY VISIT (OUTPATIENT)
Dept: PHARMACY | Facility: CLINIC | Age: 74
End: 2024-09-26
Payer: COMMERCIAL

## 2024-09-26 ENCOUNTER — APPOINTMENT (OUTPATIENT)
Dept: PRIMARY CARE | Facility: CLINIC | Age: 74
End: 2024-09-26
Payer: MEDICARE

## 2024-09-26 ENCOUNTER — TELEPHONE (OUTPATIENT)
Age: 74
End: 2024-09-26

## 2024-09-26 VITALS
HEIGHT: 68 IN | BODY MASS INDEX: 28.49 KG/M2 | HEART RATE: 60 BPM | DIASTOLIC BLOOD PRESSURE: 86 MMHG | WEIGHT: 188 LBS | SYSTOLIC BLOOD PRESSURE: 136 MMHG

## 2024-09-26 DIAGNOSIS — I10 PRIMARY HYPERTENSION: ICD-10-CM

## 2024-09-26 DIAGNOSIS — Z28.21 INFLUENZA VACCINATION DECLINED: ICD-10-CM

## 2024-09-26 DIAGNOSIS — M35.1 MIXED CONNECTIVE TISSUE DISEASE (MULTI): ICD-10-CM

## 2024-09-26 DIAGNOSIS — Z00.00 ROUTINE GENERAL MEDICAL EXAMINATION AT HEALTH CARE FACILITY: Primary | ICD-10-CM

## 2024-09-26 DIAGNOSIS — R55 SYNCOPE, UNSPECIFIED SYNCOPE TYPE: ICD-10-CM

## 2024-09-26 DIAGNOSIS — D47.2 SMOLDERING MULTIPLE MYELOMA: ICD-10-CM

## 2024-09-26 PROBLEM — S12.9XXA CERVICAL TRANSVERSE PROCESS FRACTURE, INITIAL ENCOUNTER: Status: ACTIVE | Noted: 2024-05-15

## 2024-09-26 PROBLEM — S14.3XXA INJURY OF LEFT BRACHIAL PLEXUS: Status: ACTIVE | Noted: 2024-09-26

## 2024-09-26 PROBLEM — S14.3XXA INJURY OF LEFT BRACHIAL PLEXUS: Status: ACTIVE | Noted: 2024-05-15

## 2024-09-26 PROCEDURE — 1036F TOBACCO NON-USER: CPT | Performed by: FAMILY MEDICINE

## 2024-09-26 PROCEDURE — 3008F BODY MASS INDEX DOCD: CPT | Performed by: FAMILY MEDICINE

## 2024-09-26 PROCEDURE — 99214 OFFICE O/P EST MOD 30 MIN: CPT | Performed by: FAMILY MEDICINE

## 2024-09-26 PROCEDURE — 1159F MED LIST DOCD IN RCRD: CPT | Performed by: FAMILY MEDICINE

## 2024-09-26 PROCEDURE — 3079F DIAST BP 80-89 MM HG: CPT | Performed by: FAMILY MEDICINE

## 2024-09-26 PROCEDURE — RXMED WILLOW AMBULATORY MEDICATION CHARGE

## 2024-09-26 PROCEDURE — 1170F FXNL STATUS ASSESSED: CPT | Performed by: FAMILY MEDICINE

## 2024-09-26 PROCEDURE — 1160F RVW MEDS BY RX/DR IN RCRD: CPT | Performed by: FAMILY MEDICINE

## 2024-09-26 PROCEDURE — 3075F SYST BP GE 130 - 139MM HG: CPT | Performed by: FAMILY MEDICINE

## 2024-09-26 ASSESSMENT — ACTIVITIES OF DAILY LIVING (ADL)
DRESSING: INDEPENDENT
DOING_HOUSEWORK: NEEDS ASSISTANCE
GROCERY_SHOPPING: NEEDS ASSISTANCE
MANAGING_FINANCES: INDEPENDENT
BATHING: INDEPENDENT
TAKING_MEDICATION: INDEPENDENT

## 2024-09-26 ASSESSMENT — PATIENT HEALTH QUESTIONNAIRE - PHQ9
SUM OF ALL RESPONSES TO PHQ9 QUESTIONS 1 AND 2: 0
1. LITTLE INTEREST OR PLEASURE IN DOING THINGS: NOT AT ALL
2. FEELING DOWN, DEPRESSED OR HOPELESS: NOT AT ALL

## 2024-09-26 NOTE — PROGRESS NOTES
"Subjective   Reason for Visit: Mirella Renae is an 73 y.o. female here for a Medicare Wellness visit.     Past Medical, Surgical, and Family History reviewed and updated in chart.    Reviewed all medications by prescribing practitioner or clinical pharmacist (such as prescriptions, OTCs, herbal therapies and supplements) and documented in the medical record.    HPI  Had dizzy spells for quite some time, before trauma with cervical spine fracture/left brachial plexus injury.    Has passed out three times since last seen.  Felt a little dizzy, then remembers people talking to her while she's lying on the ground.  No loss of bowel or bladder control, no seizure activity.  Wore cardiac monitor for a month this summer, and echo.  These episodes are since wearing the monitor.   She also has no memory of her traumatic fall off their deck that led to serious injury and brachial plexus injury earlier this summer.  Recommend neurology appt, MRI, EEG, and cardiac monitoring/further testing, she wants to wait and talk to her cardiologist first.  Does not drive.  Has appt at [facility] for brachial plexus injury.  Having EMG and team meeting.   Htn, stable she stopped diovan on her own, hasn't taken hydrochlorothiazide today and feels her legs and a little swollen  GERD, stable  MCTD, smoldering multiple myeloma, sees specialists  Declines influenza vcacine      Patient Care Team:  Mirtha Benjamin MD as PCP - General (Family Medicine)  Mirtha Benjamin MD as PCP - O Medicare Advantage PCP     Review of Systems    Objective   Vitals:  /86   Pulse 60   Ht 1.727 m (5' 8\")   Wt 85.3 kg (188 lb)   BMI 28.59 kg/m²       Physical Exam  Vitals and nursing note reviewed.   Constitutional:       General: She is not in acute distress.     Appearance: Normal appearance. She is not toxic-appearing.   HENT:      Head: Normocephalic and atraumatic.   Cardiovascular:      Rate and Rhythm: Normal rate and regular " rhythm.      Heart sounds: No murmur heard.  Pulmonary:      Effort: Pulmonary effort is normal.      Breath sounds: Normal breath sounds.   Musculoskeletal:      Cervical back: Neck supple. No rigidity.      Comments:     Skin:     General: Skin is warm and dry.   Neurological:      Mental Status: She is alert and oriented to person, place, and time.      Comments: Left arm deficit as previously noted   Psychiatric:         Mood and Affect: Mood normal.         Behavior: Behavior normal.       Lab on 09/17/2024   Component Date Value Ref Range Status    WBC 09/17/2024 2.7 (L)  4.4 - 11.3 x10*3/uL Final    nRBC 09/17/2024 0.0  0.0 - 0.0 /100 WBCs Final    RBC 09/17/2024 3.91 (L)  4.00 - 5.20 x10*6/uL Final    Hemoglobin 09/17/2024 11.7 (L)  12.0 - 16.0 g/dL Final    Hematocrit 09/17/2024 37.3  36.0 - 46.0 % Final    MCV 09/17/2024 95  80 - 100 fL Final    MCH 09/17/2024 29.9  26.0 - 34.0 pg Final    MCHC 09/17/2024 31.4 (L)  32.0 - 36.0 g/dL Final    RDW 09/17/2024 12.9  11.5 - 14.5 % Final    Platelets 09/17/2024 157  150 - 450 x10*3/uL Final    Neutrophils % 09/17/2024 44.7  40.0 - 80.0 % Final    Immature Granulocytes %, Automated 09/17/2024 0.0  0.0 - 0.9 % Final    Immature Granulocyte Count (IG) includes promyelocytes, myelocytes and metamyelocytes but does not include bands. Percent differential counts (%) should be interpreted in the context of the absolute cell counts (cells/UL).    Lymphocytes % 09/17/2024 38.7  13.0 - 44.0 % Final    Monocytes % 09/17/2024 12.5  2.0 - 10.0 % Final    Eosinophils % 09/17/2024 3.0  0.0 - 6.0 % Final    Basophils % 09/17/2024 1.1  0.0 - 2.0 % Final    Neutrophils Absolute 09/17/2024 1.21 (L)  1.60 - 5.50 x10*3/uL Final    Percent differential counts (%) should be interpreted in the context of the absolute cell counts (cells/uL).    Immature Granulocytes Absolute, Au* 09/17/2024 0.00  0.00 - 0.50 x10*3/uL Final    Lymphocytes Absolute 09/17/2024 1.05  0.80 - 3.00 x10*3/uL  Final    Monocytes Absolute 09/17/2024 0.34  0.05 - 0.80 x10*3/uL Final    Eosinophils Absolute 09/17/2024 0.08  0.00 - 0.40 x10*3/uL Final    Basophils Absolute 09/17/2024 0.03  0.00 - 0.10 x10*3/uL Final   Lab on 09/04/2024   Component Date Value Ref Range Status    WBC 09/04/2024 2.3 (L)  4.4 - 11.3 x10*3/uL Final    nRBC 09/04/2024 0.0  0.0 - 0.0 /100 WBCs Final    RBC 09/04/2024 4.00  4.00 - 5.20 x10*6/uL Final    Hemoglobin 09/04/2024 12.0  12.0 - 16.0 g/dL Final    Hematocrit 09/04/2024 37.9  36.0 - 46.0 % Final    MCV 09/04/2024 95  80 - 100 fL Final    MCH 09/04/2024 30.0  26.0 - 34.0 pg Final    MCHC 09/04/2024 31.7 (L)  32.0 - 36.0 g/dL Final    RDW 09/04/2024 12.8  11.5 - 14.5 % Final    Platelets 09/04/2024 170  150 - 450 x10*3/uL Final    Neutrophils % 09/04/2024 36.5  40.0 - 80.0 % Final    reran and verified    Immature Granulocytes %, Automated 09/04/2024 0.0  0.0 - 0.9 % Final    Immature Granulocyte Count (IG) includes promyelocytes, myelocytes and metamyelocytes but does not include bands. Percent differential counts (%) should be interpreted in the context of the absolute cell counts (cells/UL).    Lymphocytes % 09/04/2024 44.7  13.0 - 44.0 % Final    Monocytes % 09/04/2024 14.2  2.0 - 10.0 % Final    Eosinophils % 09/04/2024 3.7  0.0 - 6.0 % Final    Basophils % 09/04/2024 0.9  0.0 - 2.0 % Final    Neutrophils Absolute 09/04/2024 0.80 (L)  1.60 - 5.50 x10*3/uL Final    Percent differential counts (%) should be interpreted in the context of the absolute cell counts (cells/uL).    Immature Granulocytes Absolute, Au* 09/04/2024 0.00  0.00 - 0.50 x10*3/uL Final    Lymphocytes Absolute 09/04/2024 0.98  0.80 - 3.00 x10*3/uL Final    Monocytes Absolute 09/04/2024 0.31  0.05 - 0.80 x10*3/uL Final    Eosinophils Absolute 09/04/2024 0.08  0.00 - 0.40 x10*3/uL Final    Basophils Absolute 09/04/2024 0.02  0.00 - 0.10 x10*3/uL Final    Glucose 09/04/2024 90  74 - 99 mg/dL Final    Sodium 09/04/2024 138  136  - 145 mmol/L Final    Potassium 09/04/2024 4.2  3.5 - 5.3 mmol/L Final    Chloride 09/04/2024 104  98 - 107 mmol/L Final    Bicarbonate 09/04/2024 27  21 - 32 mmol/L Final    Anion Gap 09/04/2024 11  10 - 20 mmol/L Final    Urea Nitrogen 09/04/2024 25 (H)  6 - 23 mg/dL Final    Creatinine 09/04/2024 1.01  0.50 - 1.05 mg/dL Final    eGFR 09/04/2024 59 (L)  >60 mL/min/1.73m*2 Final    Calculations of estimated GFR are performed using the 2021 CKD-EPI Study Refit equation without the race variable for the IDMS-Traceable creatinine methods.  https://jasn.asnjournals.org/content/early/2021/09/22/ASN.0897034006    Calcium 09/04/2024 9.3  8.6 - 10.3 mg/dL Final    Albumin 09/04/2024 4.0  3.4 - 5.0 g/dL Final    Alkaline Phosphatase 09/04/2024 64  33 - 136 U/L Final    Total Protein 09/04/2024 7.1  6.4 - 8.2 g/dL Final    AST 09/04/2024 10  9 - 39 U/L Final    Bilirubin, Total 09/04/2024 0.6  0.0 - 1.2 mg/dL Final    ALT 09/04/2024 7  7 - 45 U/L Final    Patients treated with Sulfasalazine may generate falsely decreased results for ALT.    Cholesterol 09/04/2024 121  0 - 199 mg/dL Final          Age      Desirable   Borderline High   High     0-19 Y     0 - 169       170 - 199     >/= 200    20-24 Y     0 - 189       190 - 224     >/= 225         >24 Y     0 - 199       200 - 239     >/= 240   **All ranges are based on fasting samples. Specific   therapeutic targets will vary based on patient-specific   cardiac risk.    Pediatric guidelines reference:Pediatrics 2011, 128(S5).Adult guidelines reference: NCEP ATPIII Guidelines,MARIA DEL CARMEN 2001, 258:2486-97    Venipuncture immediately after or during the administration of Metamizole may lead to falsely low results. Testing should be performed immediately prior to Metamizole dosing.    HDL-Cholesterol 09/04/2024 39.0  mg/dL Final      Age       Very Low   Low     Normal    High    0-19 Y    < 35      < 40     40-45     ----  20-24 Y    ----     < 40      >45      ----        >24 Y       ----     < 40     40-60      >60      Cholesterol/HDL Ratio 09/04/2024 3.1   Final      Ref Values  Desirable  < 3.4  High Risk  > 5.0    LDL Calculated 09/04/2024 62  <=99 mg/dL Final                                Near   Borderline      AGE      Desirable  Optimal    High     High     Very High     0-19 Y     0 - 109     ---    110-129   >/= 130     ----    20-24 Y     0 - 119     ---    120-159   >/= 160     ----      >24 Y     0 -  99   100-129  130-159   160-189     >/=190      VLDL 09/04/2024 20  0 - 40 mg/dL Final    Triglycerides 09/04/2024 98  0 - 149 mg/dL Final       Age         Desirable   Borderline High   High     Very High   0 D-90 D    19 - 174         ----         ----        ----  91 D- 9 Y     0 -  74        75 -  99     >/= 100      ----    10-19 Y     0 -  89        90 - 129     >/= 130      ----    20-24 Y     0 - 114       115 - 149     >/= 150      ----         >24 Y     0 - 149       150 - 199    200- 499    >/= 500    Venipuncture immediately after or during the administration of Metamizole may lead to falsely low results. Testing should be performed immediately prior to Metamizole dosing.    Non HDL Cholesterol 09/04/2024 82  0 - 149 mg/dL Final          Age       Desirable   Borderline High   High     Very High     0-19 Y     0 - 119       120 - 144     >/= 145    >/= 160    20-24 Y     0 - 149       150 - 189     >/= 190      ----         >24 Y    30 mg/dL above LDL Cholesterol goal      Thyroid Stimulating Hormone 09/04/2024 1.98  0.44 - 3.98 mIU/L Final    Vitamin B12 09/04/2024 848  211 - 911 pg/mL Final         Assessment & Plan  Routine general medical examination at health care facility         Mixed connective tissue disease (Multi)         Smoldering multiple myeloma         Primary hypertension         Syncope, unspecified syncope type       As per hpi, call concerns.  Routine followup six months, no labs.  Influenza vaccination declined

## 2024-09-26 NOTE — TELEPHONE ENCOUNTER
Pt asked for refill on lasix, I'm not seeing it on pts medlist. If ok, uses [facility] pharmacy.

## 2024-09-28 ASSESSMENT — ACTIVITIES OF DAILY LIVING (ADL)
BATHING: INDEPENDENT
TAKING_MEDICATION: INDEPENDENT
GROCERY_SHOPPING: NEEDS ASSISTANCE
MANAGING_FINANCES: INDEPENDENT
DRESSING: INDEPENDENT
DOING_HOUSEWORK: NEEDS ASSISTANCE

## 2024-09-28 NOTE — ADDENDUM NOTE
Addended by: MEENU AGUILAR on: 9/28/2024 08:13 AM     Modules accepted: Orders, Level of Service

## 2024-09-28 NOTE — PROGRESS NOTES
"Subjective   Reason for Visit: Mirella Renae is an 73 y.o. female here for a Medicare Wellness visit.     Past Medical, Surgical, and Family History reviewed and updated in chart.    Reviewed all medications by prescribing practitioner or clinical pharmacist (such as prescriptions, OTCs, herbal therapies and supplements) and documented in the medical record.    HPI    Patient Care Team:  Mirtha Benjamin MD as PCP - General (Family Medicine)  Mirtha Benjamin MD as PCP - O Medicare Advantage PCP     Review of Systems    Objective   Vitals:  /86   Pulse 60   Ht 1.727 m (5' 8\")   Wt 85.3 kg (188 lb)   BMI 28.59 kg/m²       Physical Exam    Assessment & Plan  Routine general medical examination at health care facility           Mixed connective tissue disease (Multi)           Smoldering multiple myeloma           Primary hypertension           Syncope, unspecified syncope type           Influenza vaccination declined                   "

## 2024-09-30 ENCOUNTER — LAB (OUTPATIENT)
Dept: LAB | Facility: LAB | Age: 74
End: 2024-09-30
Payer: MEDICARE

## 2024-09-30 DIAGNOSIS — D47.2 SMOLDERING MULTIPLE MYELOMA: ICD-10-CM

## 2024-09-30 DIAGNOSIS — C94.6 MYELODYSPLASTIC DISEASE, NOT ELSEWHERE CLASSIFIED (MULTI): ICD-10-CM

## 2024-09-30 DIAGNOSIS — D70.8 OTHER NEUTROPENIA (CMS-HCC): ICD-10-CM

## 2024-09-30 LAB
ALBUMIN SERPL BCP-MCNC: 4 G/DL (ref 3.4–5)
ALP SERPL-CCNC: 68 U/L (ref 33–136)
ALT SERPL W P-5'-P-CCNC: 6 U/L (ref 7–45)
ANION GAP SERPL CALC-SCNC: 9 MMOL/L (ref 10–20)
AST SERPL W P-5'-P-CCNC: 10 U/L (ref 9–39)
BASOPHILS # BLD AUTO: 0.03 X10*3/UL (ref 0–0.1)
BASOPHILS NFR BLD AUTO: 1.2 %
BILIRUB SERPL-MCNC: 0.6 MG/DL (ref 0–1.2)
BUN SERPL-MCNC: 33 MG/DL (ref 6–23)
CALCIUM SERPL-MCNC: 9.3 MG/DL (ref 8.6–10.3)
CHLORIDE SERPL-SCNC: 104 MMOL/L (ref 98–107)
CMV IGG AVIDITY SERPL IA-RTO: REACTIVE %
CO2 SERPL-SCNC: 29 MMOL/L (ref 21–32)
CREAT SERPL-MCNC: 1.09 MG/DL (ref 0.5–1.05)
EBV EA IGG SER QL: POSITIVE
EBV NA AB SER QL: POSITIVE
EBV VCA IGG SER IA-ACNC: POSITIVE
EBV VCA IGM SER IA-ACNC: NEGATIVE
EGFRCR SERPLBLD CKD-EPI 2021: 54 ML/MIN/1.73M*2
EOSINOPHIL # BLD AUTO: 0.07 X10*3/UL (ref 0–0.4)
EOSINOPHIL NFR BLD AUTO: 2.9 %
ERYTHROCYTE [DISTWIDTH] IN BLOOD BY AUTOMATED COUNT: 12.8 % (ref 11.5–14.5)
GLUCOSE SERPL-MCNC: 99 MG/DL (ref 74–99)
HCT VFR BLD AUTO: 38.5 % (ref 36–46)
HGB BLD-MCNC: 12.1 G/DL (ref 12–16)
IGA SERPL-MCNC: 56 MG/DL (ref 70–400)
IGG SERPL-MCNC: 2440 MG/DL (ref 700–1600)
IGM SERPL-MCNC: 60 MG/DL (ref 40–230)
IMM GRANULOCYTES # BLD AUTO: 0.01 X10*3/UL (ref 0–0.5)
IMM GRANULOCYTES NFR BLD AUTO: 0.4 % (ref 0–0.9)
LYMPHOCYTES # BLD AUTO: 0.95 X10*3/UL (ref 0.8–3)
LYMPHOCYTES NFR BLD AUTO: 38.9 %
MCH RBC QN AUTO: 30 PG (ref 26–34)
MCHC RBC AUTO-ENTMCNC: 31.4 G/DL (ref 32–36)
MCV RBC AUTO: 96 FL (ref 80–100)
MONOCYTES # BLD AUTO: 0.26 X10*3/UL (ref 0.05–0.8)
MONOCYTES NFR BLD AUTO: 10.7 %
NEUTROPHILS # BLD AUTO: 1.12 X10*3/UL (ref 1.6–5.5)
NEUTROPHILS NFR BLD AUTO: 45.9 %
NRBC BLD-RTO: 0 /100 WBCS (ref 0–0)
PLATELET # BLD AUTO: 165 X10*3/UL (ref 150–450)
POTASSIUM SERPL-SCNC: 4.2 MMOL/L (ref 3.5–5.3)
PROT SERPL-MCNC: 7.5 G/DL (ref 6.4–8.2)
PROT SERPL-MCNC: 7.7 G/DL (ref 6.4–8.2)
RBC # BLD AUTO: 4.03 X10*6/UL (ref 4–5.2)
SODIUM SERPL-SCNC: 138 MMOL/L (ref 136–145)
WBC # BLD AUTO: 2.4 X10*3/UL (ref 4.4–11.3)

## 2024-09-30 PROCEDURE — 82784 ASSAY IGA/IGD/IGG/IGM EACH: CPT

## 2024-09-30 PROCEDURE — 88291 CYTO/MOLECULAR REPORT: CPT | Performed by: NURSE PRACTITIONER

## 2024-09-30 PROCEDURE — 88271 CYTOGENETICS DNA PROBE: CPT

## 2024-09-30 PROCEDURE — 84155 ASSAY OF PROTEIN SERUM: CPT

## 2024-09-30 PROCEDURE — 84165 PROTEIN E-PHORESIS SERUM: CPT

## 2024-09-30 PROCEDURE — 81450 HL NEO GSAP 5-50DNA/DNA&RNA: CPT

## 2024-09-30 PROCEDURE — G0452 MOLECULAR PATHOLOGY INTERPR: HCPCS | Performed by: NURSE PRACTITIONER

## 2024-09-30 PROCEDURE — 86664 EPSTEIN-BARR NUCLEAR ANTIGEN: CPT

## 2024-09-30 PROCEDURE — 86663 EPSTEIN-BARR ANTIBODY: CPT

## 2024-09-30 PROCEDURE — 86665 EPSTEIN-BARR CAPSID VCA: CPT

## 2024-09-30 PROCEDURE — 85025 COMPLETE CBC W/AUTO DIFF WBC: CPT

## 2024-09-30 PROCEDURE — 86644 CMV ANTIBODY: CPT

## 2024-09-30 PROCEDURE — 83521 IG LIGHT CHAINS FREE EACH: CPT

## 2024-09-30 PROCEDURE — 86334 IMMUNOFIX E-PHORESIS SERUM: CPT

## 2024-09-30 PROCEDURE — 80053 COMPREHEN METABOLIC PANEL: CPT

## 2024-09-30 PROCEDURE — 36415 COLL VENOUS BLD VENIPUNCTURE: CPT

## 2024-09-30 PROCEDURE — 88275 CYTOGENETICS 100-300: CPT

## 2024-09-30 PROCEDURE — 86645 CMV ANTIBODY IGM: CPT

## 2024-10-01 LAB
KAPPA LC SERPL-MCNC: 4.11 MG/DL (ref 0.33–1.94)
KAPPA LC/LAMBDA SER: 3.26 {RATIO} (ref 0.26–1.65)
LAMBDA LC SERPL-MCNC: 1.26 MG/DL (ref 0.57–2.63)

## 2024-10-02 LAB
ALBUMIN: 4 G/DL (ref 3.4–5)
ALPHA 1 GLOBULIN: 0.3 G/DL (ref 0.2–0.6)
ALPHA 2 GLOBULIN: 0.6 G/DL (ref 0.4–1.1)
BETA GLOBULIN: 0.6 G/DL (ref 0.5–1.2)
CMV IGM SERPL-ACNC: <8 AU/ML
GAMMA GLOBULIN: 2.2 G/DL (ref 0.5–1.4)
IMMUNOFIXATION COMMENT: ABNORMAL
M-PROTEIN 1: 1.4 G/DL
PATH REVIEW - SERUM IMMUNOFIXATION: ABNORMAL
PATH REVIEW-SERUM PROTEIN ELECTROPHORESIS: ABNORMAL
PROTEIN ELECTROPHORESIS COMMENT: ABNORMAL

## 2024-10-03 ENCOUNTER — PHARMACY VISIT (OUTPATIENT)
Dept: PHARMACY | Facility: CLINIC | Age: 74
End: 2024-10-03
Payer: COMMERCIAL

## 2024-10-03 PROCEDURE — RXMED WILLOW AMBULATORY MEDICATION CHARGE

## 2024-10-04 LAB
CHROM ANALY OVERALL INTERP-IMP: NORMAL
ELECTRONICALLY COSIGNED BY CYTOGENETICS: NORMAL
ELECTRONICALLY SIGNED BY CYTOGENETICS: NORMAL
ELECTRONICALLY SIGNED BY: NORMAL
FISH ISCN RESULTS: NORMAL
MYELOID NGS RESULTS: NORMAL

## 2024-10-07 ENCOUNTER — APPOINTMENT (OUTPATIENT)
Dept: LAB | Facility: CLINIC | Age: 74
End: 2024-10-07
Payer: MEDICARE

## 2024-10-07 ENCOUNTER — OFFICE VISIT (OUTPATIENT)
Dept: HEMATOLOGY/ONCOLOGY | Facility: CLINIC | Age: 74
End: 2024-10-07
Payer: MEDICARE

## 2024-10-07 VITALS
WEIGHT: 186.18 LBS | DIASTOLIC BLOOD PRESSURE: 60 MMHG | BODY MASS INDEX: 28.31 KG/M2 | RESPIRATION RATE: 18 BRPM | OXYGEN SATURATION: 98 % | HEART RATE: 69 BPM | TEMPERATURE: 98.5 F | SYSTOLIC BLOOD PRESSURE: 113 MMHG

## 2024-10-07 DIAGNOSIS — C94.6 MYELODYSPLASTIC DISEASE, NOT ELSEWHERE CLASSIFIED (MULTI): ICD-10-CM

## 2024-10-07 DIAGNOSIS — D70.8 OTHER NEUTROPENIA (CMS-HCC): ICD-10-CM

## 2024-10-07 DIAGNOSIS — D47.2 SMOLDERING MULTIPLE MYELOMA: Primary | ICD-10-CM

## 2024-10-07 PROCEDURE — 99215 OFFICE O/P EST HI 40 MIN: CPT | Performed by: NURSE PRACTITIONER

## 2024-10-07 PROCEDURE — 3078F DIAST BP <80 MM HG: CPT | Performed by: NURSE PRACTITIONER

## 2024-10-07 PROCEDURE — 1125F AMNT PAIN NOTED PAIN PRSNT: CPT | Performed by: NURSE PRACTITIONER

## 2024-10-07 PROCEDURE — 1159F MED LIST DOCD IN RCRD: CPT | Performed by: NURSE PRACTITIONER

## 2024-10-07 PROCEDURE — 3074F SYST BP LT 130 MM HG: CPT | Performed by: NURSE PRACTITIONER

## 2024-10-07 ASSESSMENT — ENCOUNTER SYMPTOMS
UNEXPECTED WEIGHT CHANGE: 0
FATIGUE: 1
EYES NEGATIVE: 1
HEMATOLOGIC/LYMPHATIC NEGATIVE: 1
CARDIOVASCULAR NEGATIVE: 1
DIZZINESS: 1
RESPIRATORY NEGATIVE: 1
HEADACHES: 1
FEVER: 0
ARTHRALGIAS: 1
LIGHT-HEADEDNESS: 1
GASTROINTESTINAL NEGATIVE: 1
NUMBNESS: 1
DIAPHORESIS: 0
LOSS OF SENSATION IN FEET: 0
OCCASIONAL FEELINGS OF UNSTEADINESS: 1
DEPRESSION: 0
EXTREMITY WEAKNESS: 1

## 2024-10-07 ASSESSMENT — COLUMBIA-SUICIDE SEVERITY RATING SCALE - C-SSRS
6. HAVE YOU EVER DONE ANYTHING, STARTED TO DO ANYTHING, OR PREPARED TO DO ANYTHING TO END YOUR LIFE?: NO
1. IN THE PAST MONTH, HAVE YOU WISHED YOU WERE DEAD OR WISHED YOU COULD GO TO SLEEP AND NOT WAKE UP?: NO
2. HAVE YOU ACTUALLY HAD ANY THOUGHTS OF KILLING YOURSELF?: NO

## 2024-10-07 ASSESSMENT — PATIENT HEALTH QUESTIONNAIRE - PHQ9
2. FEELING DOWN, DEPRESSED OR HOPELESS: NOT AT ALL
1. LITTLE INTEREST OR PLEASURE IN DOING THINGS: NOT AT ALL
SUM OF ALL RESPONSES TO PHQ9 QUESTIONS 1 AND 2: 0

## 2024-10-07 ASSESSMENT — PAIN SCALES - GENERAL: PAINLEVEL: 2

## 2024-10-07 NOTE — PROGRESS NOTES
"Patient here today for follow up. She last had a telehealth visit in July with [facility].     Fatigue: \"good days and bad days\"; no difficulty with ADLs  PO intake: adequate  N/V/D/C: denies     Medications reviewed with patient.       "

## 2024-10-07 NOTE — PROGRESS NOTES
Patient ID: Mirella Renae is a 73 y.o. female.  Referring Physician: Celi Lyons PA-C  [address]  [place] [address]  Primary Care Provider: Mirtha Benjamin MD  Visit Type: Follow Up      Subjective    HPI  70 year old woman with hx of HTN, anxiety, who is referred for leukopenia, her labs on 6/30/ 2021 and on 8/18/21 showed wbc of 2.4K and  2.6K respectively, ANC was 1.20 and 1.12 respectively, there were 1% atypical lymphs on the first differential. Hg was normal at 12.1 g/dl and platelets normal at 176. B12 level was 288.   Evaluation to date showed labs 6/26/24 WBC 2.5, hgb 9.6 and plt 216 with normal flow cytometry 2021.  Labs 2/22/24 showed IgG 2360, J1dwqptxaspfzue 4.2, kappa 3.95, ratio 2.8, monoclonal spike 1.3 and bone marrow biopsy that showed 10-15% plasma cells.  PET scan was normal.  B12 was 344 on 11/21/23 and she is on supplements  She had a traumatic accident with several fractured spinal vertebra for which she has been incapacitated.   interval history -4/2/2024   She is status post endomyocardial biopsy which did not show any myeloid deposition or other significant pathology    Current WBC 2.4 with ANC 1120, hgb 12.1 and plt 165.  Her B12 has improved from 288 to 848 with supplements.  Her current IgG 2440, kappa 4.11, ratio 3.2, monoclonal spike 1.4, BUN/crt 33/1.09 and calcium 9.3.   She continues to be treated for prior cervical spine fractures and may require upcoming surgery.         had her colonoscopy done march 2022 which was unremarkable         Review of Systems   Constitutional:  Positive for fatigue. Negative for diaphoresis, fever and unexpected weight change.   HENT:  Negative.     Eyes: Negative.    Respiratory: Negative.     Cardiovascular: Negative.    Gastrointestinal: Negative.    Musculoskeletal:  Positive for arthralgias and gait problem.   Neurological:  Positive for dizziness, extremity weakness, gait problem, headaches, light-headedness and numbness.         LUE plegia secondary to cervical spine fractures.    Hematological: Negative.       Objective   BSA: 2.01 meters squared  /60 (BP Location: Right arm, Patient Position: Sitting, BP Cuff Size: Adult long)   Pulse 69   Temp 36.9 °C (98.5 °F) (Temporal)   Resp 18   Wt 84.5 kg (186 lb 2.9 oz)   SpO2 98%   BMI 28.31 kg/m²      has a past medical history of Anxiety disorder, unspecified (09/26/2022), Encounter for general adult medical examination without abnormal findings (07/07/2020), Essential (primary) hypertension (09/26/2022), and Multiple myeloma (Multi).   has a past surgical history that includes Other surgical history (03/30/2020); Other surgical history (03/30/2020); Other surgical history (03/30/2020); Other surgical history (06/08/2020); Cardiac catheterization (N/A, 03/06/2024); Cardiac catheterization (N/A, 03/06/2024); and biopsy.  Family History   Problem Relation Name Age of Onset    Coronary artery disease Mother      Hyperlipidemia Mother      Hypertension Mother      Colon cancer Mother      Heart attack Mother      Thyroid disease Mother      Thyroid cancer Son      Colon cancer Other           Mirella Renae  reports that she has never smoked. She has never used smokeless tobacco.  She  reports that she does not currently use alcohol.  She  reports no history of drug use.    Physical Exam  Constitutional:       Comments: LUE paralysis   Eyes:      Conjunctiva/sclera: Conjunctivae normal.      Pupils: Pupils are equal, round, and reactive to light.   Cardiovascular:      Rate and Rhythm: Normal rate and regular rhythm.      Pulses: Normal pulses.      Heart sounds: Normal heart sounds. No murmur heard.  Pulmonary:      Effort: Pulmonary effort is normal. No respiratory distress.      Breath sounds: Normal breath sounds. No wheezing.   Abdominal:      General: There is no distension.      Palpations: There is no mass.      Tenderness: There is no abdominal tenderness.   Musculoskeletal:          General: Deformity present.   Lymphadenopathy:      Cervical: No cervical adenopathy.   Skin:     Coloration: Skin is not jaundiced or pale.      Findings: No bruising.   Neurological:      Comments: LUE paralysis     WBC   Date/Time Value Ref Range Status   09/30/2024 09:43 AM 2.4 (L) 4.4 - 11.3 x10*3/uL Final   09/17/2024 09:22 AM 2.7 (L) 4.4 - 11.3 x10*3/uL Final   09/04/2024 08:22 AM 2.3 (L) 4.4 - 11.3 x10*3/uL Final     nRBC   Date Value Ref Range Status   09/30/2024 0.0 0.0 - 0.0 /100 WBCs Final   09/17/2024 0.0 0.0 - 0.0 /100 WBCs Final   09/04/2024 0.0 0.0 - 0.0 /100 WBCs Final     RBC   Date Value Ref Range Status   09/30/2024 4.03 4.00 - 5.20 x10*6/uL Final   09/17/2024 3.91 (L) 4.00 - 5.20 x10*6/uL Final   09/04/2024 4.00 4.00 - 5.20 x10*6/uL Final     Hemoglobin   Date Value Ref Range Status   09/30/2024 12.1 12.0 - 16.0 g/dL Final   09/17/2024 11.7 (L) 12.0 - 16.0 g/dL Final   09/04/2024 12.0 12.0 - 16.0 g/dL Final     Hematocrit   Date Value Ref Range Status   09/30/2024 38.5 36.0 - 46.0 % Final   09/17/2024 37.3 36.0 - 46.0 % Final   09/04/2024 37.9 36.0 - 46.0 % Final     MCV   Date/Time Value Ref Range Status   09/30/2024 09:43 AM 96 80 - 100 fL Final   09/17/2024 09:22 AM 95 80 - 100 fL Final   09/04/2024 08:22 AM 95 80 - 100 fL Final     MCH   Date/Time Value Ref Range Status   09/30/2024 09:43 AM 30.0 26.0 - 34.0 pg Final   09/17/2024 09:22 AM 29.9 26.0 - 34.0 pg Final   09/04/2024 08:22 AM 30.0 26.0 - 34.0 pg Final     MCHC   Date/Time Value Ref Range Status   09/30/2024 09:43 AM 31.4 (L) 32.0 - 36.0 g/dL Final   09/17/2024 09:22 AM 31.4 (L) 32.0 - 36.0 g/dL Final   09/04/2024 08:22 AM 31.7 (L) 32.0 - 36.0 g/dL Final     RDW   Date/Time Value Ref Range Status   09/30/2024 09:43 AM 12.8 11.5 - 14.5 % Final   09/17/2024 09:22 AM 12.9 11.5 - 14.5 % Final   09/04/2024 08:22 AM 12.8 11.5 - 14.5 % Final     Platelets   Date/Time Value Ref Range Status   09/30/2024 09:43  150 - 450 x10*3/uL  "Final   09/17/2024 09:22  150 - 450 x10*3/uL Final   09/04/2024 08:22  150 - 450 x10*3/uL Final     No results found for: \"MPV\"  Neutrophils %   Date/Time Value Ref Range Status   09/30/2024 09:43 AM 45.9 40.0 - 80.0 % Final   09/17/2024 09:22 AM 44.7 40.0 - 80.0 % Final   09/04/2024 08:22 AM 36.5 40.0 - 80.0 % Final     Comment:     reran and verified     Immature Granulocytes %, Automated   Date/Time Value Ref Range Status   09/30/2024 09:43 AM 0.4 0.0 - 0.9 % Final     Comment:     Immature Granulocyte Count (IG) includes promyelocytes, myelocytes and metamyelocytes but does not include bands. Percent differential counts (%) should be interpreted in the context of the absolute cell counts (cells/UL).   09/17/2024 09:22 AM 0.0 0.0 - 0.9 % Final     Comment:     Immature Granulocyte Count (IG) includes promyelocytes, myelocytes and metamyelocytes but does not include bands. Percent differential counts (%) should be interpreted in the context of the absolute cell counts (cells/UL).   09/04/2024 08:22 AM 0.0 0.0 - 0.9 % Final     Comment:     Immature Granulocyte Count (IG) includes promyelocytes, myelocytes and metamyelocytes but does not include bands. Percent differential counts (%) should be interpreted in the context of the absolute cell counts (cells/UL).     Lymphocytes %   Date/Time Value Ref Range Status   09/30/2024 09:43 AM 38.9 13.0 - 44.0 % Final   09/17/2024 09:22 AM 38.7 13.0 - 44.0 % Final   09/04/2024 08:22 AM 44.7 13.0 - 44.0 % Final     Monocytes %   Date/Time Value Ref Range Status   09/30/2024 09:43 AM 10.7 2.0 - 10.0 % Final   09/17/2024 09:22 AM 12.5 2.0 - 10.0 % Final   09/04/2024 08:22 AM 14.2 2.0 - 10.0 % Final     Eosinophils %   Date/Time Value Ref Range Status   09/30/2024 09:43 AM 2.9 0.0 - 6.0 % Final   09/17/2024 09:22 AM 3.0 0.0 - 6.0 % Final   09/04/2024 08:22 AM 3.7 0.0 - 6.0 % Final     Basophils %   Date/Time Value Ref Range Status   09/30/2024 09:43 AM 1.2 0.0 - 2.0 % " Final   09/17/2024 09:22 AM 1.1 0.0 - 2.0 % Final   09/04/2024 08:22 AM 0.9 0.0 - 2.0 % Final     Neutrophils Absolute   Date/Time Value Ref Range Status   09/30/2024 09:43 AM 1.12 (L) 1.60 - 5.50 x10*3/uL Final     Comment:     Percent differential counts (%) should be interpreted in the context of the absolute cell counts (cells/uL).   09/17/2024 09:22 AM 1.21 (L) 1.60 - 5.50 x10*3/uL Final     Comment:     Percent differential counts (%) should be interpreted in the context of the absolute cell counts (cells/uL).   09/04/2024 08:22 AM 0.80 (L) 1.60 - 5.50 x10*3/uL Final     Comment:     Percent differential counts (%) should be interpreted in the context of the absolute cell counts (cells/uL).     Immature Granulocytes Absolute, Automated   Date/Time Value Ref Range Status   09/30/2024 09:43 AM 0.01 0.00 - 0.50 x10*3/uL Final   09/17/2024 09:22 AM 0.00 0.00 - 0.50 x10*3/uL Final   09/04/2024 08:22 AM 0.00 0.00 - 0.50 x10*3/uL Final     Lymphocytes Absolute   Date/Time Value Ref Range Status   09/30/2024 09:43 AM 0.95 0.80 - 3.00 x10*3/uL Final   09/17/2024 09:22 AM 1.05 0.80 - 3.00 x10*3/uL Final   09/04/2024 08:22 AM 0.98 0.80 - 3.00 x10*3/uL Final     Monocytes Absolute   Date/Time Value Ref Range Status   09/30/2024 09:43 AM 0.26 0.05 - 0.80 x10*3/uL Final   09/17/2024 09:22 AM 0.34 0.05 - 0.80 x10*3/uL Final   09/04/2024 08:22 AM 0.31 0.05 - 0.80 x10*3/uL Final     Eosinophils Absolute   Date/Time Value Ref Range Status   09/30/2024 09:43 AM 0.07 0.00 - 0.40 x10*3/uL Final   09/17/2024 09:22 AM 0.08 0.00 - 0.40 x10*3/uL Final   09/04/2024 08:22 AM 0.08 0.00 - 0.40 x10*3/uL Final     Basophils Absolute   Date/Time Value Ref Range Status   09/30/2024 09:43 AM 0.03 0.00 - 0.10 x10*3/uL Final   09/17/2024 09:22 AM 0.03 0.00 - 0.10 x10*3/uL Final   09/04/2024 08:22 AM 0.02 0.00 - 0.10 x10*3/uL Final         Assessment/Plan    1. Smoldering multiple myeloma   her evaluation led to the diagnosis of smoldering  multiple myeloma   bone marrow biopsy showed 10-15% plasma cells   no evidence of end organ damage , PET scan without any bone lesions   low risk SMM based on current M spike , FLC and PC percentage   plan for surveillance , the repeat labs demonstrate overall stability of her counts as well as of the paraprotein levels   We will need to repeat and follow these levels        4/28/23- she has noticed increasing dizziness episodes   additionally secondary to her arrythmia, cardiology suggested doing a cardiac MRI but she cannot secondary to marked claustrophobia and  cardiac biopsy 3/6/24 negative for congo red     I thinks she needs neurologic evaluation for autonomic dysfunction, following with cardiology for the time and will see neuro after MRI completed.   additionally will request pathology to run an amyloid stain on the prior bone marrow biopsy   Cr slightly higher, obtain 24 hrs urine collection for protein and UPEP   add BNP and troponin to next labs      if all the above testing is negative, her counts and serum proteins are otherwise stable and will continue surveillance   if testing is suggestive of AL amyloidosis then will warrant therapy      3/1/2024-the repeat PET scan showed heterogeneous marrow which is suggestive of myelomatous involvement however there is no discrete lytic lesions BNP is still elevated,      4/2/2024-   Patient is status post endomyocardial biopsy which did not show any myeloid deposition, she will follow-up with cardiology for discussion of the results later this week  At this time her labs seem to be stable and no discernible end-organ damage, we discussed to repeat a bone marrow biopsy, but she prefers to defer at this time especially if her labs remain stable, we will plan to repeat labs in 3 to 4 months     2. Leukopenia with mild neutropenia   differential diagnosis and work up discussed with patient   plan to obtain the following testing   -nutritional deficiencies: check  folic acid and start PO b12 for borderline level   she has improved B12 levels on B12 supplement  -peripheral blood flow cytometry for phenotypic abnormalities or abnormal cell population  Myeloid malignancy and BCR/ABL negative  -viral testing hep and HIV negative, but positive for CMV and EBV     3. dizziness, fatigue   at this time we will refer to neurology for autonomic dysfunction evaluation has not done this yet  she additionally has A flutter and could potentially be contributing to symptoms      4.  Abnormal thyroid uptake on the PET scan  Will order ultrasound     4/2/2024-ultrasound of the thyroid showed multiple TI-RADS 4 nodules, will refer to ENT for consideration of FNA of the largest nodule     RTC 3 m with labs     Diagnoses and all orders for this visit:  Smoldering multiple myeloma  -     CBC and Auto Differential; Future  -     Comprehensive Metabolic Panel; Future  -     Immunoglobulins (IgG, IgA, IgM); Future  -     Olar/Lambda Free Light Chain, Serum; Future  -     Serum Protein Electrophoresis + Immunofixation; Future  -     Viscosity, Serum; Future  -     Urine Protein Electrophoresis; Future  -     Clinic Appointment Request  -     CBC and Auto Differential; Future  -     Comprehensive Metabolic Panel; Future  -     Olar/Lambda Free Light Chain, Serum; Future  -     Serum Protein Electrophoresis + Immunofixation; Future  -     Immunoglobulins (IgG, IgA, IgM); Future  -     Clinic Appointment Request; Future  Other neutropenia (CMS-HCC)  -     CMV IgG, IgM; Future  -     Pratibha-Barr Virus Antibody Panel; Future  -     Flow Cytometry Test; Future  -     Myeloid Malignancies Panel; Future  -     BCR/ABL1, FISH; Future  Myelodysplastic disease, not elsewhere classified (Multi)  -     BCR/ABL1, FISH; Future           Celi Lyons PA-C

## 2024-10-25 ENCOUNTER — PHARMACY VISIT (OUTPATIENT)
Dept: PHARMACY | Facility: CLINIC | Age: 74
End: 2024-10-25
Payer: COMMERCIAL

## 2024-10-25 DIAGNOSIS — I10 PRIMARY HYPERTENSION: ICD-10-CM

## 2024-10-25 PROCEDURE — RXMED WILLOW AMBULATORY MEDICATION CHARGE

## 2024-10-25 RX ORDER — HYDROCHLOROTHIAZIDE 25 MG/1
25 TABLET ORAL DAILY
Qty: 30 TABLET | Refills: 3 | Status: SHIPPED | OUTPATIENT
Start: 2024-10-25

## 2024-10-31 ENCOUNTER — PHARMACY VISIT (OUTPATIENT)
Dept: PHARMACY | Facility: CLINIC | Age: 74
End: 2024-10-31
Payer: COMMERCIAL

## 2024-10-31 DIAGNOSIS — S14.3XXA INJURY OF BRACHIAL PLEXUS, INITIAL ENCOUNTER: ICD-10-CM

## 2024-10-31 PROCEDURE — RXMED WILLOW AMBULATORY MEDICATION CHARGE

## 2024-10-31 RX ORDER — PREGABALIN 75 MG/1
75 CAPSULE ORAL 2 TIMES DAILY
Qty: 60 CAPSULE | Refills: 2 | Status: SHIPPED | OUTPATIENT
Start: 2024-10-31

## 2024-11-11 ENCOUNTER — APPOINTMENT (OUTPATIENT)
Dept: CARDIOLOGY | Facility: CLINIC | Age: 74
End: 2024-11-11
Payer: MEDICARE

## 2024-11-11 VITALS
HEART RATE: 50 BPM | OXYGEN SATURATION: 96 % | SYSTOLIC BLOOD PRESSURE: 122 MMHG | BODY MASS INDEX: 28.26 KG/M2 | WEIGHT: 186.5 LBS | HEIGHT: 68 IN | DIASTOLIC BLOOD PRESSURE: 70 MMHG

## 2024-11-11 DIAGNOSIS — R42 DIZZINESS: Primary | ICD-10-CM

## 2024-11-11 DIAGNOSIS — I48.11 LONGSTANDING PERSISTENT ATRIAL FIBRILLATION (MULTI): ICD-10-CM

## 2024-11-11 DIAGNOSIS — R06.09 DYSPNEA ON EXERTION: ICD-10-CM

## 2024-11-11 DIAGNOSIS — I10 ESSENTIAL HYPERTENSION: ICD-10-CM

## 2024-11-11 DIAGNOSIS — R55 SYNCOPE AND COLLAPSE: ICD-10-CM

## 2024-11-11 PROCEDURE — 3078F DIAST BP <80 MM HG: CPT

## 2024-11-11 PROCEDURE — 3074F SYST BP LT 130 MM HG: CPT

## 2024-11-11 PROCEDURE — 3008F BODY MASS INDEX DOCD: CPT

## 2024-11-11 PROCEDURE — 99215 OFFICE O/P EST HI 40 MIN: CPT

## 2024-11-11 PROCEDURE — 1159F MED LIST DOCD IN RCRD: CPT

## 2024-11-11 PROCEDURE — 1036F TOBACCO NON-USER: CPT

## 2024-11-11 RX ORDER — REGADENOSON 0.08 MG/ML
0.4 INJECTION, SOLUTION INTRAVENOUS
OUTPATIENT
Start: 2024-11-11

## 2024-11-11 RX ORDER — AMINOPHYLLINE 25 MG/ML
125 INJECTION, SOLUTION INTRAVENOUS ONCE AS NEEDED
OUTPATIENT
Start: 2024-11-11

## 2024-11-11 NOTE — PROGRESS NOTES
[facility]  Cardiology Clinic Visit Note    History of present illness:  This is a 73 y.o. female never smoker with a history of hypertension, atrial fibrillation, status post endomyocardial biopsy that was negative for cardiac amyloid and history of multiple myeloma who presents to the clinic with concerns of dizziness and syncope.   She reports of episodes of syncope.  May 2024 she was reaching up to fill the bird feeder on her back deck when she reportedly fell and woke up on the ground 15 feet below.  Her  found her after estimated time for an hour.  She sustained traumatic injuries and was treated at [facility] requiring anterior cervical discectomy.  She reports impaired memory for 3 weeks after that event.  She remembers feeling very thinner and then remembers waking up on the ground.  In July 2024 she passed out while walking into a grocery store.  Her  was with her and her walking when she slowed down and stopped and then he proceeded to help lower to the ground.  She is not remember the event is unclear whether or not she fully lost consciousness.  Similar episode happened in August 2024.  She has not had a syncopal/near syncopal episode since.    Subjective:  Patient reports dizziness and lightheadedness at random times.  She gets short of breath when doing activities of daily living around the house.  Denies chest pain or pressure, leg edema, orthopnea, fever or chills.    Active Issues and Current Diagnoses  Syncope  -30-day cardiac event monitor from [facility] dated 5/23/2024 reports 100% atrial fibrillation burden with PVC and PAC burden less than 1%, heart rate range 63 to 105 bpm with an average of 77 bpm.  There was 1 episode of nonsustained VT lasting 9 beats.    Longstanding persistent atrial fibrillation, nonvalvular  -first diagnosed in oncology office 11/2023  -rate control strategy with metoprolol tartrate 25 mg BID  -Primary stroke  prevention with Xarelto 20 mg daily.   -most recent Echocardiogram 7/23/2024 from outside facility shows normal biventricular systolic function with LVEF of 57%, no hemodynamically significant valvular disease is present and there is some evidence of pulm hypertension with estimated RV systolic pressure of 52 mmHg.  Previous echo 8/31/2022 showed normal LVEF, mild AI and biatrial dilation.  -Cardiac event monitor as described above    Hypertension  -current regimen with hydrochlorothiazide 25 mg daily    Past Medical History  Past Medical History:   Diagnosis Date    Anxiety disorder, unspecified 09/26/2022    Anxiety disorder    Encounter for general adult medical examination without abnormal findings 07/07/2020    Medicare annual wellness visit, initial    Essential (primary) hypertension 09/26/2022    Hypertension    Multiple myeloma (Multi)        Past Surgical History  Past Surgical History:   Procedure Laterality Date    BIOPSY      CARDIAC CATHETERIZATION N/A 03/06/2024    Procedure: Endomyocardial Biopsy;  Surgeon: Carlos Calixto DO;  Location: [place] [address] Cardiac Cath Lab;  Service: Cardiovascular;  Laterality: N/A;    CARDIAC CATHETERIZATION N/A 03/06/2024    Procedure: Right Heart Cath;  Surgeon: Carlos Calixto DO;  Location: [place] [address] Cardiac Cath Lab;  Service: Cardiovascular;  Laterality: N/A;    OTHER SURGICAL HISTORY  03/30/2020    Appendectomy    OTHER SURGICAL HISTORY  03/30/2020    Tubal ligation    OTHER SURGICAL HISTORY  03/30/2020    Tumor excision    OTHER SURGICAL HISTORY  06/08/2020    Colonoscopy       Medications  Current Outpatient Medications   Medication Instructions    acetaminophen (TYLENOL) 1,000 mg, oral, Every 6 hours PRN    cyanocobalamin (VITAMIN B-12) 1,000 mcg, oral, Daily    hydroCHLOROthiazide (HYDRODIURIL) 25 mg, oral, Daily    hydroxychloroquine (PlaqueniL) 200 mg tablet Take 1 Tablet by mouth every day    metoprolol tartrate (Lopressor) 25 mg tablet TAKE 1  TABLET TWICE DAILY.    multivitamin tablet Take 1 tablet by mouth once daily.    naloxone (Narcan) 4 mg/0.1 mL nasal spray Administer 1 spray into one nostril for known or suspected opioid overdose. If patient worsens or does not respond, may repeat in 2-3 minutes. .    pantoprazole (PROTONIX) 40 mg, oral, Daily RT    pregabalin (LYRICA) 75 mg, oral, 2 times daily    rivaroxaban (Xarelto) 20 mg tablet TAKE ONE TABLET BY MOUTH DAILY    traMADol (Ultram) 50 mg tablet Take 1 (one) tablet (50 mg total) by mouth every 8 (eight) hours as needed for pain .       Allergies  No Known Allergies    Social History  Social History     Tobacco Use    Smoking status: Never    Smokeless tobacco: Never   Substance Use Topics    Alcohol use: Not Currently    Drug use: Never       Family History  Family History   Problem Relation Name Age of Onset    Coronary artery disease Mother      Hyperlipidemia Mother      Hypertension Mother      Colon cancer Mother      Heart attack Mother      Thyroid disease Mother      Thyroid cancer Son      Colon cancer Other         VITALS  Vitals:    11/11/24 1259   BP: 122/70   Pulse: 50   SpO2: 96%       Weight  Vitals:    11/11/24 1329   Weight: 84.6 kg (186 lb 8 oz)       PHYSICAL EXAM  Physical Exam  Vitals and nursing note reviewed.   Constitutional:       General: She is not in acute distress.  HENT:      Head: Normocephalic and atraumatic.      Mouth/Throat:      Mouth: Mucous membranes are moist.      Pharynx: Oropharynx is clear.   Eyes:      General: No scleral icterus.     Pupils: Pupils are equal, round, and reactive to light.   Cardiovascular:      Rate and Rhythm: Normal rate. Rhythm irregular.      Pulses: Normal pulses.      Heart sounds: Normal heart sounds, S1 normal and S2 normal. No murmur heard.     No friction rub.   Pulmonary:      Effort: Pulmonary effort is normal.      Breath sounds: Normal breath sounds.   Abdominal:      General: Bowel sounds are normal. There is no  distension.      Palpations: Abdomen is soft.      Tenderness: There is no abdominal tenderness.   Musculoskeletal:         General: Normal range of motion.      Cervical back: Normal range of motion and neck supple.      Right lower leg: No edema.      Left lower leg: No edema.   Skin:     General: Skin is warm and dry.      Capillary Refill: Capillary refill takes less than 2 seconds.      Findings: No rash.   Neurological:      General: No focal deficit present.      Mental Status: She is alert.   Psychiatric:         Mood and Affect: Mood normal.         Behavior: Behavior normal.         Cardiovascular Labs  Lab Results   Component Value Date    HGB 12.1 09/30/2024    HGB 11.7 (L) 09/17/2024    HGB 12.0 09/04/2024     09/30/2024    WBC 2.4 (L) 09/30/2024     09/30/2024    K 4.2 09/30/2024    CREATININE 1.09 (H) 09/30/2024    CREATININE 1.01 09/04/2024    CREATININE 0.93 07/03/2024    BUN 33 (H) 09/30/2024    CALCIUM 9.3 09/30/2024     (H) 02/22/2024    TROPHS 5 02/22/2024    TROPHS 5 08/11/2023    LDLF 71 09/20/2023       Echocardiogram  Results for orders placed in visit on 07/26/24    Transthoracic Echo Complete       The ASCVD Risk score (Mayur ISLAS, et al., 2019) failed to calculate for the following reasons:    The valid total cholesterol range is 130 to 320 mg/dL  Low Risk: <5%  Borderline Risk: 5%-7.4%  Intermediate Risk: 7.5% - 19.9%  High Risk: >20%    Assessment and Plan  -Recurrent syncope/near syncope.  Persistent atrial fibrillation, she remains in A-fib by exam today.  Cardiac event monitor May 2024 showed 1 episode of 9 beats of nonsustained VT.  She is angina equivalent of dyspnea on exertion so proceed with ischemic valuation of a Lexiscan, she cannot walk 2 city blocks due to dizziness and joint pain.  Will also obtain carotid artery duplex study.  Her atrial fibrillation is rate controlled very well, if her stress test and carotid are negative she might benefit from a loop  recorder for long-term cardiac monitoring.  Now that we know she does not have cardiac amyloid we should attempt to get her back in rhythm.  At next appointment we will discuss rhythm control strategies. She may benefit from a neurological evaluation concerning dizziness and recurrent syncope.  Return to Care:  After testing    If your symptoms worsen or progress please go directory to your nearest emergency department for evaluation.     Thank you for this interesting clinical case and allowing me to participate in the care of this patient. Please reach me out if you have any questions or if you need any clarifications regarding this patient's care.    **Disclaimer: This note was dictated by speech recognition, and every effort has been made to prevent any error in transcription, however minor errors may be present**  ___________________________________________________  Carlos Lee, MSN, CNP, ACNPC, CCRN  Advanced Practice Provider, Nurse Practitioner  Division of Cardiovascular Medicine  [facility]  [facility]

## 2024-11-21 ENCOUNTER — PHARMACY VISIT (OUTPATIENT)
Dept: PHARMACY | Facility: CLINIC | Age: 74
End: 2024-11-21
Payer: COMMERCIAL

## 2024-11-21 DIAGNOSIS — I48.0 PAROXYSMAL ATRIAL FIBRILLATION (MULTI): ICD-10-CM

## 2024-11-21 PROCEDURE — RXMED WILLOW AMBULATORY MEDICATION CHARGE

## 2024-11-21 RX ORDER — METOPROLOL TARTRATE 25 MG/1
25 TABLET, FILM COATED ORAL 2 TIMES DAILY
Qty: 180 TABLET | Refills: 3 | Status: SHIPPED | OUTPATIENT
Start: 2024-11-21 | End: 2025-11-21

## 2024-11-25 ENCOUNTER — HOSPITAL ENCOUNTER (OUTPATIENT)
Dept: RADIOLOGY | Facility: HOSPITAL | Age: 74
Discharge: HOME | End: 2024-11-25
Payer: MEDICARE

## 2024-11-25 ENCOUNTER — HOSPITAL ENCOUNTER (OUTPATIENT)
Dept: CARDIOLOGY | Facility: HOSPITAL | Age: 74
Discharge: HOME | End: 2024-11-25
Payer: MEDICARE

## 2024-11-25 ENCOUNTER — HOSPITAL ENCOUNTER (OUTPATIENT)
Dept: VASCULAR MEDICINE | Facility: HOSPITAL | Age: 74
Discharge: HOME | End: 2024-11-25
Payer: MEDICARE

## 2024-11-25 DIAGNOSIS — E04.2 MULTIPLE THYROID NODULES: ICD-10-CM

## 2024-11-25 DIAGNOSIS — R06.09 DYSPNEA ON EXERTION: ICD-10-CM

## 2024-11-25 DIAGNOSIS — R06.02 SHORTNESS OF BREATH: ICD-10-CM

## 2024-11-25 DIAGNOSIS — R42 DIZZINESS: ICD-10-CM

## 2024-11-25 PROCEDURE — 3430000001 HC RX 343 DIAGNOSTIC RADIOPHARMACEUTICALS

## 2024-11-25 PROCEDURE — 2500000004 HC RX 250 GENERAL PHARMACY W/ HCPCS (ALT 636 FOR OP/ED)

## 2024-11-25 PROCEDURE — 93880 EXTRACRANIAL BILAT STUDY: CPT

## 2024-11-25 PROCEDURE — 93880 EXTRACRANIAL BILAT STUDY: CPT | Performed by: STUDENT IN AN ORGANIZED HEALTH CARE EDUCATION/TRAINING PROGRAM

## 2024-11-25 PROCEDURE — A9502 TC99M TETROFOSMIN: HCPCS

## 2024-11-25 PROCEDURE — 78452 HT MUSCLE IMAGE SPECT MULT: CPT

## 2024-11-25 PROCEDURE — 78452 HT MUSCLE IMAGE SPECT MULT: CPT | Performed by: RADIOLOGY

## 2024-11-25 PROCEDURE — 93017 CV STRESS TEST TRACING ONLY: CPT

## 2024-11-25 PROCEDURE — 93016 CV STRESS TEST SUPVJ ONLY: CPT | Performed by: STUDENT IN AN ORGANIZED HEALTH CARE EDUCATION/TRAINING PROGRAM

## 2024-11-25 PROCEDURE — 93018 CV STRESS TEST I&R ONLY: CPT | Performed by: STUDENT IN AN ORGANIZED HEALTH CARE EDUCATION/TRAINING PROGRAM

## 2024-11-25 RX ORDER — AMINOPHYLLINE 25 MG/ML
125 INJECTION, SOLUTION INTRAVENOUS ONCE AS NEEDED
Status: DISCONTINUED | OUTPATIENT
Start: 2024-11-25 | End: 2024-11-26 | Stop reason: HOSPADM

## 2024-11-25 RX ORDER — REGADENOSON 0.08 MG/ML
0.4 INJECTION, SOLUTION INTRAVENOUS
Status: COMPLETED | OUTPATIENT
Start: 2024-11-25 | End: 2024-11-25

## 2024-12-02 ENCOUNTER — PHARMACY VISIT (OUTPATIENT)
Dept: PHARMACY | Facility: CLINIC | Age: 74
End: 2024-12-02
Payer: COMMERCIAL

## 2024-12-02 ENCOUNTER — APPOINTMENT (OUTPATIENT)
Dept: CARDIOLOGY | Facility: CLINIC | Age: 74
End: 2024-12-02
Payer: MEDICARE

## 2024-12-02 VITALS
DIASTOLIC BLOOD PRESSURE: 66 MMHG | SYSTOLIC BLOOD PRESSURE: 130 MMHG | HEIGHT: 68 IN | BODY MASS INDEX: 28.37 KG/M2 | HEART RATE: 70 BPM | WEIGHT: 187.2 LBS | OXYGEN SATURATION: 98 %

## 2024-12-02 DIAGNOSIS — I10 ESSENTIAL HYPERTENSION: ICD-10-CM

## 2024-12-02 DIAGNOSIS — R42 DIZZINESS: ICD-10-CM

## 2024-12-02 DIAGNOSIS — I48.11 LONGSTANDING PERSISTENT ATRIAL FIBRILLATION (MULTI): Primary | ICD-10-CM

## 2024-12-02 DIAGNOSIS — R55 SYNCOPE AND COLLAPSE: ICD-10-CM

## 2024-12-02 PROCEDURE — 3008F BODY MASS INDEX DOCD: CPT

## 2024-12-02 PROCEDURE — RXMED WILLOW AMBULATORY MEDICATION CHARGE

## 2024-12-02 PROCEDURE — 3075F SYST BP GE 130 - 139MM HG: CPT

## 2024-12-02 PROCEDURE — 3078F DIAST BP <80 MM HG: CPT

## 2024-12-02 PROCEDURE — 1036F TOBACCO NON-USER: CPT

## 2024-12-02 PROCEDURE — 1159F MED LIST DOCD IN RCRD: CPT

## 2024-12-02 PROCEDURE — 99214 OFFICE O/P EST MOD 30 MIN: CPT

## 2024-12-02 NOTE — PROGRESS NOTES
[facility]  Cardiology Clinic Visit Note    History of present illness:  This is a 73 y.o. female never smoker with a history of hypertension, atrial fibrillation, status post endomyocardial biopsy that was negative for cardiac amyloid and history of multiple myeloma who presents to the clinic for testing follow-up.     She reports of episodes of syncope.  May 2024 she was reaching up to fill the bird feeder on her back deck when she reportedly fell and woke up on the ground 15 feet below.  Her  found her after estimated time for an hour.  She sustained traumatic injuries and was treated at [facility] requiring anterior cervical discectomy.  She reports impaired memory for 3 weeks after that event.  She remembers feeling very thinner and then remembers waking up on the ground.  In July 2024 she passed out while walking into a grocery store.  Her  was with her and her walking when she slowed down and stopped and then he proceeded to help lower to the ground.  She is not remember the event is unclear whether or not she fully lost consciousness.  Similar episode happened in August 2024.  She has not had a syncopal/near syncopal episode since.    At her previous visit I elected for an ischemic evaluation with a Lexiscan nuclear stress test and carotid artery duplex study for dizziness and syncope.    Subjective:  She continues to endorse dizziness but denied any recurrent syncopal episodes.  She denies chest pain or pressure or shortness of breath fever chills orthopnea.    Active Issues and Current Diagnoses  Syncope  -30-day cardiac event monitor from [facility] dated 5/23/2024 reports 100% atrial fibrillation burden with PVC and PAC burden less than 1%, heart rate range 63 to 105 bpm with an average of 77 bpm.  There was 1 episode of nonsustained VT lasting 9 beats.  -Lexiscan 11/25/2024 showed normal perfusion, negative for ischemia  -Carotid artery duplex study  11/25/2024 shows less than 50% stenosis of right and left internal carotid arteries.     Longstanding persistent atrial fibrillation, nonvalvular  -first diagnosed in oncology office 11/2023  -rate control strategy with metoprolol tartrate 25 mg BID  -Primary stroke prevention with Xarelto 20 mg daily.   -most recent Echocardiogram 7/23/2024 from outside facility shows normal biventricular systolic function with LVEF of 57%, no hemodynamically significant valvular disease is present and there is some evidence of pulm hypertension with estimated RV systolic pressure of 52 mmHg.  Previous echo 8/31/2022 showed normal LVEF, mild AI and biatrial dilation.  -Cardiac event monitor as described above     Hypertension  -current regimen with hydrochlorothiazide 25 mg daily    Assessment and Plan  Preoperative Cardiovascular Examination and Risk Stratification  Larson Risk for Myocardia Infarction or Cardiac Arrest (CB)  0.4% risk of myocardial infraction or cardiac arrest, intraoperatively or up to 30 days post-op.  Low risk: major adverse cardiac event risk <1%    -Lexiscan was normal and carotid artery duplex study showed less than 50% bilateral stenosis of the internal carotid arteries.  I do not have a good cardiac explanation for her syncopal episode.  She is compliant with her active anticoagulation at the time of the event and she denied any palpitations or fast heart rates what she can him at that time so it is unlikely that her syncope was from her A-fib.  She remains in rate controlled A-fib by exam today.  She states she has never been cardioverted or had any rhythm control strategies.  I think it is reasonable to pursue rhythm control strategies however she has been A-fib for at least a year so it may be difficult to obtain rhythm control.  If she were to be cardioverted back to sinus rhythm that is unlikely she will maintain sinus rhythm without use of an antiarrhythmic medication.  Because she has been in  A-fib for at least a year I do not think flecainide would be very effective and she would like to avoid the use of amiodarone due to long-term effects.  I will refer her to [facility] for consideration of rhythm control strategies such as Tikosyn/sotalol load or catheter-based intervention.  She states she has an upcoming nerve surgery for brachial plexopathy.  Her stress test was negative for ischemia with a risk of 0.4% for major adverse cardiac event so I believe it is safe for her to proceed with a surgery from a cardiac standpoint however because of her persistent atrial fibrillation but it will be ideal that she does not stop her Xarelto for more than 48 hours due to her high stroke risk.  However, it is up to the patient to determine if the benefits of the planned surgery outweighs the risk of stroke.     Return to Care:  General cardiology follow-up in 6 months    Objective  Past Medical History  Past Medical History:   Diagnosis Date    Anxiety disorder, unspecified 09/26/2022    Anxiety disorder    Encounter for general adult medical examination without abnormal findings 07/07/2020    Medicare annual wellness visit, initial    Essential (primary) hypertension 09/26/2022    Hypertension    Multiple myeloma        Past Surgical History  Past Surgical History:   Procedure Laterality Date    BIOPSY      CARDIAC CATHETERIZATION N/A 03/06/2024    Procedure: Endomyocardial Biopsy;  Surgeon: Carlos Calixto DO;  Location: [place] [address] Cardiac Cath Lab;  Service: Cardiovascular;  Laterality: N/A;    CARDIAC CATHETERIZATION N/A 03/06/2024    Procedure: Right Heart Cath;  Surgeon: Carlos Calixto DO;  Location: [place] [address] Cardiac Cath Lab;  Service: Cardiovascular;  Laterality: N/A;    OTHER SURGICAL HISTORY  03/30/2020    Appendectomy    OTHER SURGICAL HISTORY  03/30/2020    Tubal ligation    OTHER SURGICAL HISTORY  03/30/2020    Tumor excision    OTHER SURGICAL HISTORY  06/08/2020     Colonoscopy       Medications  Current Outpatient Medications   Medication Instructions    acetaminophen (TYLENOL) 1,000 mg, Every 6 hours PRN    cyanocobalamin (VITAMIN B-12) 1,000 mcg, Daily    hydroCHLOROthiazide (HYDRODIURIL) 25 mg, oral, Daily    hydroxychloroquine (PlaqueniL) 200 mg tablet Take 1 Tablet by mouth every day    metoprolol tartrate (Lopressor) 25 mg tablet TAKE 1 TABLET TWICE DAILY.    multivitamin tablet Take 1 tablet by mouth once daily.    naloxone (Narcan) 4 mg/0.1 mL nasal spray Administer 1 spray into one nostril for known or suspected opioid overdose. If patient worsens or does not respond, may repeat in 2-3 minutes. .    pantoprazole (PROTONIX) 40 mg, Daily RT    pregabalin (LYRICA) 75 mg, oral, 2 times daily    rivaroxaban (Xarelto) 20 mg tablet TAKE ONE TABLET BY MOUTH DAILY    traMADol (Ultram) 50 mg tablet Take 1 (one) tablet (50 mg total) by mouth every 8 (eight) hours as needed for pain .       Allergies  No Known Allergies    Social History  Social History     Tobacco Use    Smoking status: Never    Smokeless tobacco: Never   Substance Use Topics    Alcohol use: Not Currently    Drug use: Never       Family History  Family History   Problem Relation Name Age of Onset    Coronary artery disease Mother      Hyperlipidemia Mother      Hypertension Mother      Colon cancer Mother      Heart attack Mother      Thyroid disease Mother      Thyroid cancer Son      Colon cancer Other         VITALS  Vitals:    12/02/24 1028   BP: 130/66   Pulse: 70   SpO2: 98%       Weight  Vitals:    12/02/24 1028   Weight: 84.9 kg (187 lb 3.2 oz)       PHYSICAL EXAM  Physical Exam  Vitals and nursing note reviewed.   Constitutional:       General: She is not in acute distress.  HENT:      Head: Normocephalic and atraumatic.      Mouth/Throat:      Mouth: Mucous membranes are moist.      Pharynx: Oropharynx is clear.   Eyes:      General: No scleral icterus.     Pupils: Pupils are equal, round, and reactive  to light.   Cardiovascular:      Rate and Rhythm: Normal rate. Rhythm irregular.      Pulses: Normal pulses.      Heart sounds: Normal heart sounds, S1 normal and S2 normal. No murmur heard.     No friction rub.   Pulmonary:      Effort: Pulmonary effort is normal.      Breath sounds: Normal breath sounds.   Abdominal:      General: Bowel sounds are normal. There is no distension.      Palpations: Abdomen is soft.      Tenderness: There is no abdominal tenderness.   Musculoskeletal:         General: Normal range of motion.      Cervical back: Normal range of motion and neck supple.      Right lower leg: No edema.      Left lower leg: No edema.   Skin:     General: Skin is warm and dry.      Capillary Refill: Capillary refill takes less than 2 seconds.      Findings: No rash.   Neurological:      General: No focal deficit present.      Mental Status: She is alert.   Psychiatric:         Mood and Affect: Mood normal.         Behavior: Behavior normal.         Cardiovascular Labs  Lab Results   Component Value Date    HGB 12.1 09/30/2024    HGB 11.7 (L) 09/17/2024    HGB 12.0 09/04/2024     09/30/2024    WBC 2.4 (L) 09/30/2024     09/30/2024    K 4.2 09/30/2024    CREATININE 1.09 (H) 09/30/2024    CREATININE 1.01 09/04/2024    CREATININE 0.93 07/03/2024    BUN 33 (H) 09/30/2024    CALCIUM 9.3 09/30/2024     (H) 02/22/2024    TROPHS 5 02/22/2024    TROPHS 5 08/11/2023    LDLF 71 09/20/2023       Echocardiogram  Results for orders placed in visit on 07/26/24    Transthoracic Echo Complete       The ASCVD Risk score ([place] DK, et al., 2019) failed to calculate for the following reasons:    The valid total cholesterol range is 130 to 320 mg/dL  Low Risk: <5%  Borderline Risk: 5%-7.4%  Intermediate Risk: 7.5% - 19.9%  High Risk: >20%    If your symptoms worsen or progress please go directory to your nearest emergency department for evaluation.     Thank you for this interesting clinical case and  allowing me to participate in the care of this patient. Please reach me out if you have any questions or if you need any clarifications regarding this patient's care.    **Disclaimer: This note was dictated by speech recognition, and every effort has been made to prevent any error in transcription, however minor errors may be present**  ___________________________________________________  Carlos Lee, MSN, CNP, ACNPC, CCRN  Advanced Practice Provider, Nurse Practitioner  Division of Cardiovascular Medicine  [facility]  [facility]

## 2024-12-23 ENCOUNTER — PHARMACY VISIT (OUTPATIENT)
Dept: PHARMACY | Facility: CLINIC | Age: 74
End: 2024-12-23
Payer: COMMERCIAL

## 2024-12-23 PROCEDURE — RXMED WILLOW AMBULATORY MEDICATION CHARGE

## 2024-12-30 ENCOUNTER — LAB (OUTPATIENT)
Dept: LAB | Facility: LAB | Age: 74
End: 2024-12-30
Payer: MEDICARE

## 2024-12-30 DIAGNOSIS — D47.2 SMOLDERING MULTIPLE MYELOMA: ICD-10-CM

## 2024-12-30 LAB
ALBUMIN SERPL BCP-MCNC: 4 G/DL (ref 3.4–5)
ALP SERPL-CCNC: 62 U/L (ref 33–136)
ALT SERPL W P-5'-P-CCNC: 10 U/L (ref 7–45)
ANION GAP SERPL CALC-SCNC: 11 MMOL/L (ref 10–20)
AST SERPL W P-5'-P-CCNC: 14 U/L (ref 9–39)
BASOPHILS # BLD AUTO: 0.02 X10*3/UL (ref 0–0.1)
BASOPHILS NFR BLD AUTO: 0.7 %
BILIRUB SERPL-MCNC: 0.5 MG/DL (ref 0–1.2)
BUN SERPL-MCNC: 34 MG/DL (ref 6–23)
CALCIUM SERPL-MCNC: 8.9 MG/DL (ref 8.6–10.3)
CHLORIDE SERPL-SCNC: 107 MMOL/L (ref 98–107)
CO2 SERPL-SCNC: 24 MMOL/L (ref 21–32)
CREAT SERPL-MCNC: 0.95 MG/DL (ref 0.5–1.05)
EGFRCR SERPLBLD CKD-EPI 2021: 63 ML/MIN/1.73M*2
EOSINOPHIL # BLD AUTO: 0.04 X10*3/UL (ref 0–0.4)
EOSINOPHIL NFR BLD AUTO: 1.3 %
ERYTHROCYTE [DISTWIDTH] IN BLOOD BY AUTOMATED COUNT: 13.2 % (ref 11.5–14.5)
GLUCOSE SERPL-MCNC: 107 MG/DL (ref 74–99)
HCT VFR BLD AUTO: 38 % (ref 36–46)
HGB BLD-MCNC: 12.2 G/DL (ref 12–16)
IMM GRANULOCYTES # BLD AUTO: 0 X10*3/UL (ref 0–0.5)
IMM GRANULOCYTES NFR BLD AUTO: 0 % (ref 0–0.9)
LYMPHOCYTES # BLD AUTO: 1.08 X10*3/UL (ref 0.8–3)
LYMPHOCYTES NFR BLD AUTO: 36.4 %
MCH RBC QN AUTO: 30.8 PG (ref 26–34)
MCHC RBC AUTO-ENTMCNC: 32.1 G/DL (ref 32–36)
MCV RBC AUTO: 96 FL (ref 80–100)
MONOCYTES # BLD AUTO: 0.32 X10*3/UL (ref 0.05–0.8)
MONOCYTES NFR BLD AUTO: 10.8 %
NEUTROPHILS # BLD AUTO: 1.51 X10*3/UL (ref 1.6–5.5)
NEUTROPHILS NFR BLD AUTO: 50.8 %
NRBC BLD-RTO: 0 /100 WBCS (ref 0–0)
PLATELET # BLD AUTO: 157 X10*3/UL (ref 150–450)
POTASSIUM SERPL-SCNC: 4 MMOL/L (ref 3.5–5.3)
PROT SERPL-MCNC: 7.4 G/DL (ref 6.4–8.2)
PROT SERPL-MCNC: 7.8 G/DL (ref 6.4–8.2)
PROT UR-ACNC: 12 MG/DL (ref 5–25)
RBC # BLD AUTO: 3.96 X10*6/UL (ref 4–5.2)
SODIUM SERPL-SCNC: 138 MMOL/L (ref 136–145)
WBC # BLD AUTO: 3 X10*3/UL (ref 4.4–11.3)

## 2024-12-30 PROCEDURE — 83521 IG LIGHT CHAINS FREE EACH: CPT

## 2024-12-30 PROCEDURE — 82784 ASSAY IGA/IGD/IGG/IGM EACH: CPT

## 2024-12-30 PROCEDURE — 84156 ASSAY OF PROTEIN URINE: CPT

## 2024-12-30 PROCEDURE — 84155 ASSAY OF PROTEIN SERUM: CPT

## 2024-12-30 PROCEDURE — 86320 SERUM IMMUNOELECTROPHORESIS: CPT | Performed by: NURSE PRACTITIONER

## 2024-12-30 PROCEDURE — 85025 COMPLETE CBC W/AUTO DIFF WBC: CPT

## 2024-12-30 PROCEDURE — 85810 BLOOD VISCOSITY EXAMINATION: CPT

## 2024-12-30 PROCEDURE — 84166 PROTEIN E-PHORESIS/URINE/CSF: CPT

## 2024-12-30 PROCEDURE — 86334 IMMUNOFIX E-PHORESIS SERUM: CPT

## 2024-12-30 PROCEDURE — 84166 PROTEIN E-PHORESIS/URINE/CSF: CPT | Performed by: NURSE PRACTITIONER

## 2024-12-30 PROCEDURE — 84165 PROTEIN E-PHORESIS SERUM: CPT

## 2024-12-30 PROCEDURE — 80053 COMPREHEN METABOLIC PANEL: CPT

## 2024-12-30 PROCEDURE — 84165 PROTEIN E-PHORESIS SERUM: CPT | Performed by: NURSE PRACTITIONER

## 2024-12-31 LAB
HETEROPH AB SER QL: 2.02 CP (ref 1.5–1.8)
IGA SERPL-MCNC: 49 MG/DL (ref 70–400)
IGG SERPL-MCNC: 2450 MG/DL (ref 700–1600)
IGM SERPL-MCNC: 41 MG/DL (ref 40–230)
KAPPA LC SERPL-MCNC: 3.78 MG/DL (ref 0.33–1.94)
KAPPA LC/LAMBDA SER: 3.41 {RATIO} (ref 0.26–1.65)
LAMBDA LC SERPL-MCNC: 1.11 MG/DL (ref 0.57–2.63)

## 2025-01-02 ENCOUNTER — PHARMACY VISIT (OUTPATIENT)
Dept: PHARMACY | Facility: CLINIC | Age: 75
End: 2025-01-02
Payer: COMMERCIAL

## 2025-01-02 LAB
ALBUMIN MFR UR ELPH: 32 %
ALPHA1 GLOB MFR UR ELPH: 4.9 %
ALPHA2 GLOB MFR UR ELPH: 13.2 %
B-GLOBULIN MFR UR ELPH: 24.6 %
GAMMA GLOB MFR UR ELPH: 25.3 %
M-PROTEIN 1 URINE %: 6.5 %
PATH REVIEW-URINE PROTEIN ELECTROPHORESIS: ABNORMAL
URINE ELECTROPHORESIS COMMENT: ABNORMAL

## 2025-01-02 PROCEDURE — RXMED WILLOW AMBULATORY MEDICATION CHARGE

## 2025-01-04 LAB
ALBUMIN: 4.2 G/DL (ref 3.4–5)
ALPHA 1 GLOBULIN: 0.3 G/DL (ref 0.2–0.6)
ALPHA 2 GLOBULIN: 0.5 G/DL (ref 0.4–1.1)
BETA GLOBULIN: 0.5 G/DL (ref 0.5–1.2)
GAMMA GLOBULIN: 2.3 G/DL (ref 0.5–1.4)
IMMUNOFIXATION COMMENT: ABNORMAL
M-PROTEIN 1: 1.6 G/DL
PATH REVIEW - SERUM IMMUNOFIXATION: ABNORMAL
PATH REVIEW-SERUM PROTEIN ELECTROPHORESIS: ABNORMAL
PROTEIN ELECTROPHORESIS COMMENT: ABNORMAL

## 2025-01-06 ENCOUNTER — APPOINTMENT (OUTPATIENT)
Dept: CARDIOLOGY | Facility: CLINIC | Age: 75
End: 2025-01-06
Payer: MEDICARE

## 2025-01-07 ENCOUNTER — APPOINTMENT (OUTPATIENT)
Dept: HEMATOLOGY/ONCOLOGY | Facility: CLINIC | Age: 75
End: 2025-01-07
Payer: MEDICARE

## 2025-01-21 ENCOUNTER — PHARMACY VISIT (OUTPATIENT)
Dept: PHARMACY | Facility: CLINIC | Age: 75
End: 2025-01-21
Payer: COMMERCIAL

## 2025-01-21 PROCEDURE — RXMED WILLOW AMBULATORY MEDICATION CHARGE

## 2025-01-31 ENCOUNTER — LAB REQUISITION (OUTPATIENT)
Dept: LAB | Facility: HOSPITAL | Age: 75
End: 2025-01-31
Payer: MEDICARE

## 2025-01-31 DIAGNOSIS — R09.02 HYPOXEMIA: ICD-10-CM

## 2025-01-31 DIAGNOSIS — I10 ESSENTIAL (PRIMARY) HYPERTENSION: ICD-10-CM

## 2025-01-31 LAB
ALBUMIN SERPL BCP-MCNC: 3.1 G/DL (ref 3.4–5)
ALP SERPL-CCNC: 53 U/L (ref 33–136)
ALT SERPL W P-5'-P-CCNC: 23 U/L (ref 7–45)
ANION GAP SERPL CALC-SCNC: 12 MMOL/L (ref 10–20)
AST SERPL W P-5'-P-CCNC: 24 U/L (ref 9–39)
BILIRUB SERPL-MCNC: 1.7 MG/DL (ref 0–1.2)
BUN SERPL-MCNC: 21 MG/DL (ref 6–23)
CALCIUM SERPL-MCNC: 8.5 MG/DL (ref 8.6–10.3)
CHLORIDE SERPL-SCNC: 101 MMOL/L (ref 98–107)
CO2 SERPL-SCNC: 25 MMOL/L (ref 21–32)
CREAT SERPL-MCNC: 0.67 MG/DL (ref 0.5–1.05)
EGFRCR SERPLBLD CKD-EPI 2021: >90 ML/MIN/1.73M*2
ERYTHROCYTE [DISTWIDTH] IN BLOOD BY AUTOMATED COUNT: 12.8 % (ref 11.5–14.5)
GLUCOSE SERPL-MCNC: 84 MG/DL (ref 74–99)
HCT VFR BLD AUTO: 27.7 % (ref 36–46)
HGB BLD-MCNC: 8.8 G/DL (ref 12–16)
MCH RBC QN AUTO: 30.3 PG (ref 26–34)
MCHC RBC AUTO-ENTMCNC: 31.8 G/DL (ref 32–36)
MCV RBC AUTO: 96 FL (ref 80–100)
NRBC BLD-RTO: 0 /100 WBCS (ref 0–0)
PLATELET # BLD AUTO: 210 X10*3/UL (ref 150–450)
POTASSIUM SERPL-SCNC: 4.4 MMOL/L (ref 3.5–5.3)
PROT SERPL-MCNC: 6 G/DL (ref 6.4–8.2)
RBC # BLD AUTO: 2.9 X10*6/UL (ref 4–5.2)
SODIUM SERPL-SCNC: 134 MMOL/L (ref 136–145)
WBC # BLD AUTO: 3.8 X10*3/UL (ref 4.4–11.3)

## 2025-01-31 PROCEDURE — 36415 COLL VENOUS BLD VENIPUNCTURE: CPT | Mod: OUT | Performed by: INTERNAL MEDICINE

## 2025-01-31 PROCEDURE — 85027 COMPLETE CBC AUTOMATED: CPT | Mod: OUT | Performed by: INTERNAL MEDICINE

## 2025-01-31 PROCEDURE — 80053 COMPREHEN METABOLIC PANEL: CPT | Mod: OUT | Performed by: INTERNAL MEDICINE

## 2025-02-02 ENCOUNTER — NURSING HOME VISIT (OUTPATIENT)
Dept: POST ACUTE CARE | Facility: EXTERNAL LOCATION | Age: 75
End: 2025-02-02
Payer: MEDICARE

## 2025-02-02 DIAGNOSIS — R09.02 HYPOXIA: Primary | ICD-10-CM

## 2025-02-02 DIAGNOSIS — Z48.811 AFTERCARE FOLLOWING SURGERY OF THE NERVOUS SYSTEM: ICD-10-CM

## 2025-02-02 PROBLEM — Z79.899 HIGH RISK MEDICATION USE: Status: ACTIVE | Noted: 2025-02-02

## 2025-02-02 PROBLEM — I48.0 PAROXYSMAL ATRIAL FIBRILLATION (MULTI): Status: RESOLVED | Noted: 2023-02-04 | Resolved: 2025-02-02

## 2025-02-02 PROCEDURE — 99304 1ST NF CARE SF/LOW MDM 25: CPT | Performed by: INTERNAL MEDICINE

## 2025-02-02 NOTE — PROGRESS NOTES
Pt was seen in the NH.  Pt is 73 yo f with PMH HTN MM Afib mixed connective tissue disease cardiomyopathy who developed respiratory failure and hypotension after left shoulder surgery(nerve transplant). Eels fine now   General appearance: Comfortable, no distress  ROS: No SOB  Medications reviewed  Head: Normal  Neck: Soft  Heart: Regular  Lungs: Clear  Abdomen: soft  Ext left upper ext on sling    Plan:   1) PT OT, clinically improving  2) To continue vicodin 5/325 qid/prn    Problem List Items Addressed This Visit    None  Visit Diagnoses       Hypoxia    -  Primary    Aftercare following surgery of the nervous system

## 2025-02-02 NOTE — PROGRESS NOTES
Reason For Consult:    Atrial Fibrillation    History Of Present Illness:    This is a 74-year-old female with atrial fibrillation.  She presents today for an electrophysiology consultation.  In January 2025 the patient presented to an emergency department with hypoxia and a syncopal episode.  She was treated for acute hypoxic respiratory failure and was weaned off oxygen.  Orthostatic vitals at that time were abnormal.  Hospital records reviewed.    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 She has never used smokeless tobacco. She reports that she does not currently use alcohol. She reports that she does not use drugs.    Family History:  Family History   Problem Relation Name Age of Onset    Coronary artery disease Mother      Hyperlipidemia Mother      Hypertension Mother      Colon cancer Mother      Heart attack Mother      Thyroid disease Mother      Thyroid cancer Son      Colon cancer Other         Allergies:  Patient has no known allergies.    Medications:  Current Outpatient Medications   Medication Instructions    acetaminophen (TYLENOL) 1,000 mg, Every 6 hours PRN    cyanocobalamin (VITAMIN B-12) 1,000 mcg, Daily    hydroCHLOROthiazide (HYDRODIURIL) 25 mg, oral, Daily    hydroxychloroquine (PlaqueniL) 200 mg tablet Take 1 Tablet by mouth every day    metoprolol tartrate (Lopressor) 25 mg tablet TAKE 1 TABLET TWICE DAILY.    multivitamin tablet Take 1 tablet by mouth once daily.    naloxone (Narcan) 4 mg/0.1 mL nasal spray Administer 1 spray into one nostril for  "known or suspected opioid overdose. If patient worsens or does not respond, may repeat in 2-3 minutes. .    pantoprazole (PROTONIX) 40 mg, Daily RT    pregabalin (LYRICA) 75 mg, oral, 2 times daily    rivaroxaban (Xarelto) 20 mg tablet TAKE ONE TABLET BY MOUTH DAILY    traMADol (Ultram) 50 mg tablet Take 1 (one) tablet (50 mg total) by mouth every 8 (eight) hours as needed for pain .       Vitals:      8/5/2024     1:01 PM 9/26/2024    10:52 AM 9/26/2024    12:22 PM 10/7/2024     1:41 PM 11/11/2024    12:59 PM 11/11/2024     1:29 PM 12/2/2024    10:28 AM   Vitals   Systolic 110 140 136 113 122  130   Diastolic 66 82 86 60 70  66   BP Location Right arm Right arm  Right arm      Heart Rate 68 60  69 50  70   Temp    36.9 °C (98.5 °F)      Resp    18      Height 1.727 m (5' 8\") 1.727 m (5' 8\")   1.727 m (5' 8\")  1.727 m (5' 8\")   Weight (lb) 184 188  186.18  186.5 187.2   BMI 27.98 kg/m2 28.59 kg/m2  28.31 kg/m2 28.31 kg/m2 28.36 kg/m2 28.46 kg/m2   BSA (m2) 2 m2 2.02 m2  2.01 m2 2.01 m2 2.01 m2 2.02 m2   Visit Report Report Report Report Report Report Report Report       Physical Exam:    General Appearance:  Alert, oriented, no distress  Skin:  Warm and dry  Head and Neck:  No elevation of JVP, no carotid bruits  Cardiac Exam:  Rhythm is irregular, S1 and S2 are normal, no murmur S3 or S4  Lungs:  Clear to auscultation  Extremities:  no edema  Neurologic:  No focal deficits  Psychiatric:  Appropriate mood and behavior    Lab Results:     CMP:  Recent Labs     01/31/25  0707 12/30/24  1014 09/30/24  0943 09/04/24  0822 07/03/24  1255 06/26/24  1234 03/11/24  1010 02/22/24  0924   * 138 138 138 136 137 138 139   K 4.4 4.0 4.2 4.2 4.0 4.4 4.0 3.9    107 104 104 102 105 106 106   CO2 25 24 29 27 27 25 24 26   ANIONGAP 12 11 9* 11 11 11 12 11   BUN 21 34* 33* 25* 29* 22 28* 30*   CREATININE 0.67 0.95 1.09* 1.01 0.93 0.85 1.02 0.97   EGFR >90 63 54* 59* 65 73 59* 62     Recent Labs     01/31/25  0707 " "12/30/24  1014 09/30/24  0943 09/04/24  0822 07/03/24  1255   ALBUMIN 3.1* 4.0 4.0 4.0 4.1   ALKPHOS 53 62 68 64 74   ALT 23 10 6* 7 10   AST 24 14 10 10 12   BILITOT 1.7* 0.5 0.6 0.6 0.8     CBC:  Recent Labs     01/31/25  0707 12/30/24  1014 09/30/24  0943 09/17/24  0922 09/04/24  0822 07/03/24  1255 06/26/24  1234 03/11/24  1010   WBC 3.8* 3.0* 2.4* 2.7* 2.3* 2.7* 2.5* 3.2*   HGB 8.8* 12.2 12.1 11.7* 12.0 10.5* 9.6* 12.4   HCT 27.7* 38.0 38.5 37.3 37.9 34.1* 31.8* 38.0    157 165 157 170 231 216 181   MCV 96 96 96 95 95 96 97 96     COAG: No results for input(s): \"PTT\", \"INR\", \"HAUF\", \"DDIMERVTE\", \"HAPTOGLOBIN\", \"FIBRINOGEN\" in the last 70292 hours.  HEME/ENDO:  Recent Labs     09/04/24  0822 07/03/24  1255 06/26/24  1234 09/20/23  0801 12/12/22  0817   FERRITIN  --  383*  --   --   --    IRONSAT  --  19*  --   --   --    TSH 1.98  --  1.30 1.77 2.66      CARDIAC:   Recent Labs     06/26/24  1234 02/22/24  0924 08/11/23  0914 01/12/22  0753 10/22/21  0742     --   --  114 118   TROPHS  --  5 5  --   --    BNP  --  317* 397*  --   --      Recent Labs     09/04/24  0822 09/20/23  0801 08/22/22  0750 08/18/21  0734   CHOL 121 132 127 141   LDLF  --  71 56 73   HDL 39.0 36.0* 46.0 41.0   TRIG 98 124 123 135       Test Results (personally reviewed):    Event monitor July 2024: Atrial fibrillation with a minimum heart rate of 63 bpm, maximum heart rate 105 bpm, and average heart rate 77 bpm.    Echocardiogram May 2024: Ejection fraction 57%, RVSP 52 mmHg    EKG August 15, 2022: Atrial flutter  EKG August 2023: Atrial fibrillation  EKG November 2024: Atrial fibrillation    Assessment/Plan  {Assess/Plan SmartLinks:[id number]}  James C Ramicone, DO    "

## 2025-02-02 NOTE — LETTER
Patient: Mirella Renae  : 1950    Encounter Date: 2025    Pt was seen in the NH.  Pt is 75 yo f with PMH HTN MM Afib mixed connective tissue disease cardiomyopathy who developed respiratory failure and hypotension after left shoulder surgery(nerve transplant). Eels fine now   General appearance: Comfortable, no distress  ROS: No SOB  Medications reviewed  Head: Normal  Neck: Soft  Heart: Regular  Lungs: Clear  Abdomen: soft  Ext left upper ext on sling    Plan:   1) PT OT, clinically improving  2) To continue vicodin 5/325 qid/prn    Problem List Items Addressed This Visit    None  Visit Diagnoses       Hypoxia    -  Primary    Aftercare following surgery of the nervous system                   Electronically Signed By: Jon Comer MD   25  2:47 PM

## 2025-02-03 ENCOUNTER — APPOINTMENT (OUTPATIENT)
Dept: CARDIOLOGY | Facility: CLINIC | Age: 75
End: 2025-02-03
Payer: MEDICARE

## 2025-02-03 DIAGNOSIS — I48.11 LONGSTANDING PERSISTENT ATRIAL FIBRILLATION (MULTI): ICD-10-CM

## 2025-02-03 DIAGNOSIS — Z79.899 HIGH RISK MEDICATION USE: Primary | ICD-10-CM

## 2025-02-03 PROBLEM — R09.02 HYPOXIA: Status: ACTIVE | Noted: 2025-01-26

## 2025-02-17 ENCOUNTER — TELEPHONE (OUTPATIENT)
Dept: HEMATOLOGY/ONCOLOGY | Facility: CLINIC | Age: 75
End: 2025-02-17
Payer: MEDICARE

## 2025-02-17 NOTE — TELEPHONE ENCOUNTER
Message left with request to have labs drawn prior to appt to allow provider to review on the phone at her virtual appt./

## 2025-02-21 ENCOUNTER — PHARMACY VISIT (OUTPATIENT)
Dept: PHARMACY | Facility: CLINIC | Age: 75
End: 2025-02-21
Payer: COMMERCIAL

## 2025-02-21 DIAGNOSIS — S14.3XXA INJURY OF BRACHIAL PLEXUS, INITIAL ENCOUNTER: ICD-10-CM

## 2025-02-21 PROCEDURE — RXMED WILLOW AMBULATORY MEDICATION CHARGE

## 2025-02-21 RX ORDER — PREGABALIN 75 MG/1
75 CAPSULE ORAL 2 TIMES DAILY
Qty: 60 CAPSULE | Refills: 2 | Status: SHIPPED | OUTPATIENT
Start: 2025-02-21

## 2025-02-25 ENCOUNTER — LAB (OUTPATIENT)
Dept: LAB | Facility: HOSPITAL | Age: 75
End: 2025-02-25
Payer: MEDICARE

## 2025-02-25 ENCOUNTER — APPOINTMENT (OUTPATIENT)
Dept: HEMATOLOGY/ONCOLOGY | Facility: CLINIC | Age: 75
End: 2025-02-25
Payer: MEDICARE

## 2025-02-25 DIAGNOSIS — D63.8 ANEMIA IN OTHER CHRONIC DISEASES CLASSIFIED ELSEWHERE: ICD-10-CM

## 2025-02-25 DIAGNOSIS — D63.1 ANEMIA IN CHRONIC KIDNEY DISEASE (CODE): ICD-10-CM

## 2025-02-25 DIAGNOSIS — D47.2 SMOLDERING MULTIPLE MYELOMA: ICD-10-CM

## 2025-02-25 DIAGNOSIS — D47.2 SMOLDERING MULTIPLE MYELOMA: Primary | ICD-10-CM

## 2025-02-25 LAB
ALBUMIN SERPL BCP-MCNC: 3.9 G/DL (ref 3.4–5)
ALP SERPL-CCNC: 70 U/L (ref 33–136)
ALT SERPL W P-5'-P-CCNC: 6 U/L (ref 7–45)
ANION GAP SERPL CALC-SCNC: 11 MMOL/L (ref 10–20)
AST SERPL W P-5'-P-CCNC: 10 U/L (ref 9–39)
BASOPHILS # BLD AUTO: 0.02 X10*3/UL (ref 0–0.1)
BASOPHILS NFR BLD AUTO: 0.7 %
BILIRUB SERPL-MCNC: 0.8 MG/DL (ref 0–1.2)
BUN SERPL-MCNC: 30 MG/DL (ref 6–23)
CALCIUM SERPL-MCNC: 9.1 MG/DL (ref 8.6–10.3)
CHLORIDE SERPL-SCNC: 102 MMOL/L (ref 98–107)
CO2 SERPL-SCNC: 26 MMOL/L (ref 21–32)
CREAT SERPL-MCNC: 0.85 MG/DL (ref 0.5–1.05)
EGFRCR SERPLBLD CKD-EPI 2021: 72 ML/MIN/1.73M*2
EOSINOPHIL # BLD AUTO: 0.03 X10*3/UL (ref 0–0.4)
EOSINOPHIL NFR BLD AUTO: 1 %
ERYTHROCYTE [DISTWIDTH] IN BLOOD BY AUTOMATED COUNT: 16.3 % (ref 11.5–14.5)
FERRITIN SERPL-MCNC: 310 NG/ML (ref 8–150)
GLUCOSE SERPL-MCNC: 102 MG/DL (ref 74–99)
HCT VFR BLD AUTO: 33.2 % (ref 36–46)
HGB BLD-MCNC: 10.5 G/DL (ref 12–16)
IMM GRANULOCYTES # BLD AUTO: 0.01 X10*3/UL (ref 0–0.5)
IMM GRANULOCYTES NFR BLD AUTO: 0.3 % (ref 0–0.9)
IRON SATN MFR SERPL: 24 % (ref 25–45)
IRON SERPL-MCNC: 68 UG/DL (ref 35–150)
LYMPHOCYTES # BLD AUTO: 0.84 X10*3/UL (ref 0.8–3)
LYMPHOCYTES NFR BLD AUTO: 28.4 %
MCH RBC QN AUTO: 31.4 PG (ref 26–34)
MCHC RBC AUTO-ENTMCNC: 31.6 G/DL (ref 32–36)
MCV RBC AUTO: 99 FL (ref 80–100)
MONOCYTES # BLD AUTO: 0.35 X10*3/UL (ref 0.05–0.8)
MONOCYTES NFR BLD AUTO: 11.8 %
NEUTROPHILS # BLD AUTO: 1.71 X10*3/UL (ref 1.6–5.5)
NEUTROPHILS NFR BLD AUTO: 57.8 %
NRBC BLD-RTO: 0 /100 WBCS (ref 0–0)
PLATELET # BLD AUTO: 221 X10*3/UL (ref 150–450)
POTASSIUM SERPL-SCNC: 4.2 MMOL/L (ref 3.5–5.3)
PROT SERPL-MCNC: 7.2 G/DL (ref 6.4–8.2)
RBC # BLD AUTO: 3.34 X10*6/UL (ref 4–5.2)
SODIUM SERPL-SCNC: 135 MMOL/L (ref 136–145)
TIBC SERPL-MCNC: 283 UG/DL (ref 240–445)
UIBC SERPL-MCNC: 215 UG/DL (ref 110–370)
WBC # BLD AUTO: 3 X10*3/UL (ref 4.4–11.3)

## 2025-02-25 PROCEDURE — 85025 COMPLETE CBC W/AUTO DIFF WBC: CPT

## 2025-02-25 PROCEDURE — 86334 IMMUNOFIX E-PHORESIS SERUM: CPT

## 2025-02-25 PROCEDURE — 84155 ASSAY OF PROTEIN SERUM: CPT

## 2025-02-25 PROCEDURE — 82784 ASSAY IGA/IGD/IGG/IGM EACH: CPT

## 2025-02-25 PROCEDURE — 83540 ASSAY OF IRON: CPT

## 2025-02-25 PROCEDURE — 83550 IRON BINDING TEST: CPT

## 2025-02-25 PROCEDURE — 80053 COMPREHEN METABOLIC PANEL: CPT

## 2025-02-25 PROCEDURE — 84165 PROTEIN E-PHORESIS SERUM: CPT

## 2025-02-25 PROCEDURE — 82607 VITAMIN B-12: CPT

## 2025-02-25 PROCEDURE — 82728 ASSAY OF FERRITIN: CPT

## 2025-02-26 LAB
IGA SERPL-MCNC: 56 MG/DL (ref 70–400)
IGG SERPL-MCNC: 2790 MG/DL (ref 700–1600)
IGM SERPL-MCNC: 59 MG/DL (ref 40–230)
PROT SERPL-MCNC: 7.5 G/DL (ref 6.4–8.2)
VIT B12 SERPL-MCNC: 737 PG/ML (ref 211–911)

## 2025-02-27 LAB
ALBUMIN: 3.9 G/DL (ref 3.4–5)
ALPHA 1 GLOBULIN: 0.3 G/DL (ref 0.2–0.6)
ALPHA 2 GLOBULIN: 0.6 G/DL (ref 0.4–1.1)
BETA GLOBULIN: 0.6 G/DL (ref 0.5–1.2)
GAMMA GLOBULIN: 2.1 G/DL (ref 0.5–1.4)
IMMUNOFIXATION COMMENT: ABNORMAL
M-PROTEIN 1: 1.3 G/DL
PATH REVIEW - SERUM IMMUNOFIXATION: ABNORMAL
PATH REVIEW-SERUM PROTEIN ELECTROPHORESIS: ABNORMAL
PROTEIN ELECTROPHORESIS COMMENT: ABNORMAL

## 2025-03-01 PROBLEM — M62.81 MUSCLE WEAKNESS (GENERALIZED): Status: ACTIVE | Noted: 2025-01-29

## 2025-03-01 PROBLEM — I25.10 ATHEROSCLEROTIC HEART DISEASE OF NATIVE CORONARY ARTERY WITHOUT ANGINA PECTORIS: Status: ACTIVE | Noted: 2025-01-29

## 2025-03-01 PROBLEM — E78.5 HYPERLIPIDEMIA, UNSPECIFIED: Status: ACTIVE | Noted: 2025-01-29

## 2025-03-01 PROBLEM — R09.02 HYPOXEMIA: Status: ACTIVE | Noted: 2025-01-29

## 2025-03-01 NOTE — PROGRESS NOTES
Reason For Consult:    Atrial Fibrillation    History Of Present Illness:    This is a 74-year-old female with atrial fibrillation.  She presents today for an electrophysiology consultation.  In January 2025 the patient presented to an emergency department with hypoxia and a syncopal episode.  She was treated for acute hypoxic respiratory failure and was weaned off oxygen.  Prior to this she had extensive surgery for a nerve transplant to the left shoulder which took approximately 9 hours.  The patient had a traumatic fall with C-spine fracture requiring surgery.  Orthostatic vitals at that time were abnormal.  Hospital records reviewed.    Past medical history:    Persistent atrial fibrillation  Coronary artery disease  Hyperlipidemia  Hypertension  Obesity  History of endomyocardial biopsy that was negative for cardiac amyloid  History of multiple myeloma  History of syncope    Past surgical history:    Left shoulder nerve transplant surgery January 2024  Anterior cervical spine surgery with discectomy and fusion    Social history: Non-smoker    Family history: Negative for premature CAD    Review of systems:  Other review of systems negative other than as outlined in HPI    Social History:  She reports that she has never smoked. She has never used smokeless tobacco. She reports that she does not currently use alcohol. She reports that she does not use drugs.    Family History:  Family History   Problem Relation Name Age of Onset    Coronary artery disease Mother      Hyperlipidemia Mother      Hypertension Mother      Colon cancer Mother      Heart attack Mother      Thyroid disease Mother      Thyroid cancer Son      Colon cancer Other         Allergies:  Patient has no known allergies.    Medications:  Current Outpatient Medications   Medication Instructions    acetaminophen (TYLENOL) 1,000 mg, Every 6 hours PRN    cyanocobalamin (VITAMIN B-12) 1,000 mcg, Daily    hydroCHLOROthiazide (HYDRODIURIL) 25 mg,  "oral, Daily    hydroxychloroquine (PlaqueniL) 200 mg tablet Take 1 Tablet by mouth every day    metoprolol tartrate (Lopressor) 25 mg tablet TAKE 1 TABLET TWICE DAILY.    multivitamin tablet Take 1 tablet by mouth once daily.    naloxone (Narcan) 4 mg/0.1 mL nasal spray Administer 1 spray into one nostril for known or suspected opioid overdose. If patient worsens or does not respond, may repeat in 2-3 minutes. .    pantoprazole (PROTONIX) 40 mg, Daily RT    pregabalin (LYRICA) 75 mg, oral, 2 times daily    rivaroxaban (Xarelto) 20 mg tablet TAKE ONE TABLET BY MOUTH DAILY    traMADol (Ultram) 50 mg tablet Take 1 (one) tablet (50 mg total) by mouth every 8 (eight) hours as needed for pain .       Vitals:      8/5/2024     1:01 PM 9/26/2024    10:52 AM 9/26/2024    12:22 PM 10/7/2024     1:41 PM 11/11/2024    12:59 PM 11/11/2024     1:29 PM 12/2/2024    10:28 AM   Vitals   Systolic 110 140 136 113 122  130   Diastolic 66 82 86 60 70  66   BP Location Right arm Right arm  Right arm      Heart Rate 68 60  69 50  70   Temp    36.9 °C (98.5 °F)      Resp    18      Height 1.727 m (5' 8\") 1.727 m (5' 8\")   1.727 m (5' 8\")  1.727 m (5' 8\")   Weight (lb) 184 188  186.18  186.5 187.2   BMI 27.98 kg/m2 28.59 kg/m2  28.31 kg/m2 28.31 kg/m2 28.36 kg/m2 28.46 kg/m2   BSA (m2) 2 m2 2.02 m2  2.01 m2 2.01 m2 2.01 m2 2.02 m2   Visit Report Report Report Report Report Report Report Report       Physical Exam:    General Appearance:  Alert, oriented, no distress  Skin:  Warm and dry  Head and Neck:  No elevation of JVP, no carotid bruits  Cardiac Exam:  Rhythm is irregular, S1 and S2 are normal, no murmur S3 or S4  Lungs:  Clear to auscultation  Extremities:  no edema  Neurologic: Unable to move left arm  Psychiatric:  Appropriate mood and behavior    Lab Results:     CMP:  Recent Labs     02/25/25  1147 01/31/25  0707 12/30/24  1014 09/30/24  0943 09/04/24  0822 07/03/24  1255 06/26/24  1234 03/11/24  1010   * 134* 138 138 138 " "136 137 138   K 4.2 4.4 4.0 4.2 4.2 4.0 4.4 4.0    101 107 104 104 102 105 106   CO2 26 25 24 29 27 27 25 24   ANIONGAP 11 12 11 9* 11 11 11 12   BUN 30* 21 34* 33* 25* 29* 22 28*   CREATININE 0.85 0.67 0.95 1.09* 1.01 0.93 0.85 1.02   EGFR 72 >90 63 54* 59* 65 73 59*     Recent Labs     02/25/25  1147 01/31/25  0707 12/30/24  1014 09/30/24  0943 09/04/24  0822   ALBUMIN 3.9 3.1* 4.0 4.0 4.0   ALKPHOS 70 53 62 68 64   ALT 6* 23 10 6* 7   AST 10 24 14 10 10   BILITOT 0.8 1.7* 0.5 0.6 0.6     CBC:  Recent Labs     02/25/25  1147 01/31/25  0707 12/30/24  1014 09/30/24  0943 09/17/24  0922 09/04/24  0822 07/03/24  1255 06/26/24  1234   WBC 3.0* 3.8* 3.0* 2.4* 2.7* 2.3* 2.7* 2.5*   HGB 10.5* 8.8* 12.2 12.1 11.7* 12.0 10.5* 9.6*   HCT 33.2* 27.7* 38.0 38.5 37.3 37.9 34.1* 31.8*    210 157 165 157 170 231 216   MCV 99 96 96 96 95 95 96 97     COAG: No results for input(s): \"PTT\", \"INR\", \"HAUF\", \"DDIMERVTE\", \"HAPTOGLOBIN\", \"FIBRINOGEN\" in the last 34420 hours.  HEME/ENDO:  Recent Labs     02/25/25  1147 09/04/24  0822 07/03/24  1255 06/26/24  1234 09/20/23  0801 12/12/22  0817   FERRITIN 310*  --  383*  --   --   --    IRONSAT 24*  --  19*  --   --   --    TSH  --  1.98  --  1.30 1.77 2.66      CARDIAC:   Recent Labs     06/26/24  1234 02/22/24  0924 08/11/23  0914 01/12/22  0753 10/22/21  0742     --   --  114 118   TROPHS  --  5 5  --   --    BNP  --  317* 397*  --   --      Recent Labs     09/04/24  0822 09/20/23  0801 08/22/22  0750 08/18/21  0734   CHOL 121 132 127 141   LDLF  --  71 56 73   HDL 39.0 36.0* 46.0 41.0   TRIG 98 124 123 135       Test Results (personally reviewed):    Event monitor July 2024: Atrial fibrillation with a minimum heart rate of 63 bpm, maximum heart rate 105 bpm, and average heart rate 77 bpm.    Echocardiogram May 2024: Ejection fraction 57%, RVSP 52 mmHg    EKG August 15, 2022: Atrial flutter  EKG August 2023: Atrial fibrillation  EKG November 2024: Atrial " fibrillation    Assessment/Plan  Problem List Items Addressed This Visit             ICD-10-CM    Essential hypertension I10     The patient has had spells of lightheadedness when she changes from seated to standing and she has felt near syncopal.  I am suspicious that she is experiencing orthostatic hypotension.  She also has reported some low blood pressure readings at home.  The most recent basic metabolic panel showed a BUN of 30 mg/dL.  I have advised her to decrease the hydrochlorothiazide to 12.5 mg daily as she may be slightly volume depleted.  If symptoms persist, the hydrochlorothiazide will be discontinued.         Relevant Medications    hydroCHLOROthiazide (Microzide) 12.5 mg capsule    Longstanding persistent atrial fibrillation (Multi) I48.11     Mirella has been in atrial fibrillation since at least August 15, 2022 based on review of the electrocardiograms.  The ventricular response is under good control.  She had a cardiac event monitor back in July 2024 which showed atrial fibrillation with a minimum heart rate of 63 bpm, maximum heart rate 105 bpm, and average heart rate 77 bpm.  Continue metoprolol at the same dosage.  I would not recommend rhythm control strategies given the duration of this arrhythmia and lack of symptoms related to the atrial fibrillation.  EP follow-up in 8 to 10 months.         High risk medication use - Primary Z79.899     No hemorrhagic problems on anticoagulation.          Other Visit Diagnoses         Codes    Primary hypertension     I10    Relevant Medications    hydroCHLOROthiazide (Microzide) 12.5 mg capsule    Paroxysmal atrial fibrillation (Multi)     I48.0    Relevant Medications    rivaroxaban (Xarelto) 20 mg tablet          James C Ramicone, DO

## 2025-03-03 ENCOUNTER — PHARMACY VISIT (OUTPATIENT)
Dept: PHARMACY | Facility: CLINIC | Age: 75
End: 2025-03-03
Payer: COMMERCIAL

## 2025-03-03 ENCOUNTER — OFFICE VISIT (OUTPATIENT)
Dept: CARDIOLOGY | Facility: CLINIC | Age: 75
End: 2025-03-03
Payer: MEDICARE

## 2025-03-03 VITALS
BODY MASS INDEX: 27.58 KG/M2 | OXYGEN SATURATION: 98 % | HEIGHT: 68 IN | WEIGHT: 182 LBS | DIASTOLIC BLOOD PRESSURE: 78 MMHG | SYSTOLIC BLOOD PRESSURE: 122 MMHG | HEART RATE: 71 BPM

## 2025-03-03 DIAGNOSIS — Z79.899 HIGH RISK MEDICATION USE: Primary | ICD-10-CM

## 2025-03-03 DIAGNOSIS — I48.0 PAROXYSMAL ATRIAL FIBRILLATION (MULTI): ICD-10-CM

## 2025-03-03 DIAGNOSIS — I10 PRIMARY HYPERTENSION: ICD-10-CM

## 2025-03-03 DIAGNOSIS — I48.11 LONGSTANDING PERSISTENT ATRIAL FIBRILLATION (MULTI): ICD-10-CM

## 2025-03-03 DIAGNOSIS — I10 ESSENTIAL HYPERTENSION: ICD-10-CM

## 2025-03-03 PROCEDURE — RXMED WILLOW AMBULATORY MEDICATION CHARGE

## 2025-03-03 PROCEDURE — 3074F SYST BP LT 130 MM HG: CPT | Performed by: INTERNAL MEDICINE

## 2025-03-03 PROCEDURE — 1036F TOBACCO NON-USER: CPT | Performed by: INTERNAL MEDICINE

## 2025-03-03 PROCEDURE — 99214 OFFICE O/P EST MOD 30 MIN: CPT | Performed by: INTERNAL MEDICINE

## 2025-03-03 PROCEDURE — 3008F BODY MASS INDEX DOCD: CPT | Performed by: INTERNAL MEDICINE

## 2025-03-03 PROCEDURE — 1159F MED LIST DOCD IN RCRD: CPT | Performed by: INTERNAL MEDICINE

## 2025-03-03 PROCEDURE — 3078F DIAST BP <80 MM HG: CPT | Performed by: INTERNAL MEDICINE

## 2025-03-03 RX ORDER — HYDROCHLOROTHIAZIDE 12.5 MG/1
12.5 CAPSULE ORAL DAILY
Qty: 90 CAPSULE | Refills: 3 | Status: SHIPPED | OUTPATIENT
Start: 2025-03-03 | End: 2026-03-03

## 2025-03-03 NOTE — ASSESSMENT & PLAN NOTE
Mirella has been in atrial fibrillation since at least August 15, 2022 based on review of the electrocardiograms.  The ventricular response is under good control.  She had a cardiac event monitor back in July 2024 which showed atrial fibrillation with a minimum heart rate of 63 bpm, maximum heart rate 105 bpm, and average heart rate 77 bpm.  Continue metoprolol at the same dosage.  I would not recommend rhythm control strategies given the duration of this arrhythmia and lack of symptoms related to the atrial fibrillation.  EP follow-up in 8 to 10 months.

## 2025-03-03 NOTE — ASSESSMENT & PLAN NOTE
The patient has had spells of lightheadedness when she changes from seated to standing and she has felt near syncopal.  I am suspicious that she is experiencing orthostatic hypotension.  She also has reported some low blood pressure readings at home.  The most recent basic metabolic panel showed a BUN of 30 mg/dL.  I have advised her to decrease the hydrochlorothiazide to 12.5 mg daily as she may be slightly volume depleted.  If symptoms persist, the hydrochlorothiazide will be discontinued.

## 2025-03-03 NOTE — LETTER
March 3, 2025     Carlos Lee, APRN-CNP  [address]   [facility]  [facility] [address]    Patient: Mirella Renae   YOB: 1950   Date of Visit: 3/3/2025       Dear Carlos,    Thank you for referring Mirella Renae to me for evaluation. Below are my notes for this consultation.  If you have questions, please do not hesitate to call me. I look forward to following your patient along with you.       Sincerely,     James C Ramicone, DO      CC: Mirtha Benjamin MD  ______________________________________________________________________________________          Reason For Consult:    Atrial Fibrillation    History Of Present Illness:    This is a 74-year-old female with atrial fibrillation.  She presents today for an electrophysiology consultation.  In January 2025 the patient presented to an emergency department with hypoxia and a syncopal episode.  She was treated for acute hypoxic respiratory failure and was weaned off oxygen.  Prior to this she had extensive surgery for a nerve transplant to the left shoulder which took approximately 9 hours.  The patient had a traumatic fall with C-spine fracture requiring surgery.  Orthostatic vitals at that time were abnormal.  Hospital records reviewed.    Past medical history:    Persistent atrial fibrillation  Coronary artery disease  Hyperlipidemia  Hypertension  Obesity  History of endomyocardial biopsy that was negative for cardiac amyloid  History of multiple myeloma  History of syncope    Past surgical history:    Left shoulder nerve transplant surgery January 2024  Anterior cervical spine surgery with discectomy and fusion    Social history: Non-smoker    Family history: Negative for premature CAD    Review of systems:  Other review of systems negative other than as outlined in HPI    Social History:  She reports that she has never smoked. She has never used smokeless tobacco. She reports that she does not currently use alcohol. She reports that she  "does not use drugs.    Family History:  Family History   Problem Relation Name Age of Onset   • Coronary artery disease Mother     • Hyperlipidemia Mother     • Hypertension Mother     • Colon cancer Mother     • Heart attack Mother     • Thyroid disease Mother     • Thyroid cancer Son     • Colon cancer Other         Allergies:  Patient has no known allergies.    Medications:  Current Outpatient Medications   Medication Instructions   • acetaminophen (TYLENOL) 1,000 mg, Every 6 hours PRN   • cyanocobalamin (VITAMIN B-12) 1,000 mcg, Daily   • hydroCHLOROthiazide (HYDRODIURIL) 25 mg, oral, Daily   • hydroxychloroquine (PlaqueniL) 200 mg tablet Take 1 Tablet by mouth every day   • metoprolol tartrate (Lopressor) 25 mg tablet TAKE 1 TABLET TWICE DAILY.   • multivitamin tablet Take 1 tablet by mouth once daily.   • naloxone (Narcan) 4 mg/0.1 mL nasal spray Administer 1 spray into one nostril for known or suspected opioid overdose. If patient worsens or does not respond, may repeat in 2-3 minutes. .   • pantoprazole (PROTONIX) 40 mg, Daily RT   • pregabalin (LYRICA) 75 mg, oral, 2 times daily   • rivaroxaban (Xarelto) 20 mg tablet TAKE ONE TABLET BY MOUTH DAILY   • traMADol (Ultram) 50 mg tablet Take 1 (one) tablet (50 mg total) by mouth every 8 (eight) hours as needed for pain .       Vitals:      8/5/2024     1:01 PM 9/26/2024    10:52 AM 9/26/2024    12:22 PM 10/7/2024     1:41 PM 11/11/2024    12:59 PM 11/11/2024     1:29 PM 12/2/2024    10:28 AM   Vitals   Systolic 110 140 136 113 122  130   Diastolic 66 82 86 60 70  66   BP Location Right arm Right arm  Right arm      Heart Rate 68 60  69 50  70   Temp    36.9 °C (98.5 °F)      Resp    18      Height 1.727 m (5' 8\") 1.727 m (5' 8\")   1.727 m (5' 8\")  1.727 m (5' 8\")   Weight (lb) 184 188  186.18  186.5 187.2   BMI 27.98 kg/m2 28.59 kg/m2  28.31 kg/m2 28.31 kg/m2 28.36 kg/m2 28.46 kg/m2   BSA (m2) 2 m2 2.02 m2  2.01 m2 2.01 m2 2.01 m2 2.02 m2   Visit Report Report " "Report Report Report Report Report Report       Physical Exam:    General Appearance:  Alert, oriented, no distress  Skin:  Warm and dry  Head and Neck:  No elevation of JVP, no carotid bruits  Cardiac Exam:  Rhythm is irregular, S1 and S2 are normal, no murmur S3 or S4  Lungs:  Clear to auscultation  Extremities:  no edema  Neurologic: Unable to move left arm  Psychiatric:  Appropriate mood and behavior    Lab Results:     CMP:  Recent Labs     02/25/25  1147 01/31/25  0707 12/30/24  1014 09/30/24  0943 09/04/24  0822 07/03/24  1255 06/26/24  1234 03/11/24  1010   * 134* 138 138 138 136 137 138   K 4.2 4.4 4.0 4.2 4.2 4.0 4.4 4.0    101 107 104 104 102 105 106   CO2 26 25 24 29 27 27 25 24   ANIONGAP 11 12 11 9* 11 11 11 12   BUN 30* 21 34* 33* 25* 29* 22 28*   CREATININE 0.85 0.67 0.95 1.09* 1.01 0.93 0.85 1.02   EGFR 72 >90 63 54* 59* 65 73 59*     Recent Labs     02/25/25  1147 01/31/25  0707 12/30/24  1014 09/30/24  0943 09/04/24  0822   ALBUMIN 3.9 3.1* 4.0 4.0 4.0   ALKPHOS 70 53 62 68 64   ALT 6* 23 10 6* 7   AST 10 24 14 10 10   BILITOT 0.8 1.7* 0.5 0.6 0.6     CBC:  Recent Labs     02/25/25  1147 01/31/25  0707 12/30/24  1014 09/30/24  0943 09/17/24  0922 09/04/24  0822 07/03/24  1255 06/26/24  1234   WBC 3.0* 3.8* 3.0* 2.4* 2.7* 2.3* 2.7* 2.5*   HGB 10.5* 8.8* 12.2 12.1 11.7* 12.0 10.5* 9.6*   HCT 33.2* 27.7* 38.0 38.5 37.3 37.9 34.1* 31.8*    210 157 165 157 170 231 216   MCV 99 96 96 96 95 95 96 97     COAG: No results for input(s): \"PTT\", \"INR\", \"HAUF\", \"DDIMERVTE\", \"HAPTOGLOBIN\", \"FIBRINOGEN\" in the last 70007 hours.  HEME/ENDO:  Recent Labs     02/25/25  1147 09/04/24  0822 07/03/24  1255 06/26/24  1234 09/20/23  0801 12/12/22  0817   FERRITIN 310*  --  383*  --   --   --    IRONSAT 24*  --  19*  --   --   --    TSH  --  1.98  --  1.30 1.77 2.66      CARDIAC:   Recent Labs     06/26/24  1234 02/22/24  0924 08/11/23  0914 01/12/22  0753 10/22/21  0742     --   --  114 118 "   TROPHS  --  5 5  --   --    BNP  --  317* 397*  --   --      Recent Labs     09/04/24  0822 09/20/23  0801 08/22/22  0750 08/18/21  0734   CHOL 121 132 127 141   LDLF  --  71 56 73   HDL 39.0 36.0* 46.0 41.0   TRIG 98 124 123 135       Test Results (personally reviewed):    Event monitor July 2024: Atrial fibrillation with a minimum heart rate of 63 bpm, maximum heart rate 105 bpm, and average heart rate 77 bpm.    Echocardiogram May 2024: Ejection fraction 57%, RVSP 52 mmHg    EKG August 15, 2022: Atrial flutter  EKG August 2023: Atrial fibrillation  EKG November 2024: Atrial fibrillation    Assessment/Plan  Problem List Items Addressed This Visit             ICD-10-CM    Essential hypertension I10     The patient has had spells of lightheadedness when she changes from seated to standing and she has felt near syncopal.  I am suspicious that she is experiencing orthostatic hypotension.  She also has reported some low blood pressure readings at home.  The most recent basic metabolic panel showed a BUN of 30 mg/dL.  I have advised her to decrease the hydrochlorothiazide to 12.5 mg daily as she may be slightly volume depleted.  If symptoms persist, the hydrochlorothiazide will be discontinued.         Relevant Medications    hydroCHLOROthiazide (Microzide) 12.5 mg capsule    Longstanding persistent atrial fibrillation (Multi) I48.11     Mirella has been in atrial fibrillation since at least August 15, 2022 based on review of the electrocardiograms.  The ventricular response is under good control.  She had a cardiac event monitor back in July 2024 which showed atrial fibrillation with a minimum heart rate of 63 bpm, maximum heart rate 105 bpm, and average heart rate 77 bpm.  Continue metoprolol at the same dosage.  I would not recommend rhythm control strategies given the duration of this arrhythmia and lack of symptoms related to the atrial fibrillation.  EP follow-up in 8 to 10 months.         High risk medication use  - Primary Z79.899     No hemorrhagic problems on anticoagulation.          Other Visit Diagnoses         Codes    Primary hypertension     I10    Relevant Medications    hydroCHLOROthiazide (Microzide) 12.5 mg capsule    Paroxysmal atrial fibrillation (Multi)     I48.0    Relevant Medications    rivaroxaban (Xarelto) 20 mg tablet          James C Ramicone, DO

## 2025-03-06 LAB
KAPPA LC SERPL-MCNC: 3.96 MG/DL (ref 0.33–1.94)
KAPPA LC/LAMBDA SER: 2.85 {RATIO} (ref 0.26–1.65)
LAMBDA LC SERPL-MCNC: 1.39 MG/DL (ref 0.57–2.63)

## 2025-03-07 ENCOUNTER — TELEMEDICINE (OUTPATIENT)
Dept: HEMATOLOGY/ONCOLOGY | Facility: CLINIC | Age: 75
End: 2025-03-07
Payer: MEDICARE

## 2025-03-07 DIAGNOSIS — D47.2 SMOLDERING MULTIPLE MYELOMA: ICD-10-CM

## 2025-03-07 PROCEDURE — 99213 OFFICE O/P EST LOW 20 MIN: CPT | Performed by: NURSE PRACTITIONER

## 2025-03-07 ASSESSMENT — ENCOUNTER SYMPTOMS
EYE PROBLEMS: 0
FEVER: 0
ABDOMINAL DISTENTION: 0
BRUISES/BLEEDS EASILY: 0
ARTHRALGIAS: 0
SHORTNESS OF BREATH: 0
COUGH: 0
CHEST TIGHTNESS: 0
DIAPHORESIS: 0
ADENOPATHY: 0
PALPITATIONS: 0
LEG SWELLING: 0
DIZZINESS: 1
FATIGUE: 1
HEADACHES: 0
MYALGIAS: 0
ABDOMINAL PAIN: 0
BACK PAIN: 0
LIGHT-HEADEDNESS: 1
EXTREMITY WEAKNESS: 0
HEMOPTYSIS: 0

## 2025-03-07 NOTE — PROGRESS NOTES
Patient ID: Mirella Renae is a 74 y.o. female.  Referring Physician: Celi Lyons PA-C  [address] [place]  [place] [address]  Primary Care Provider: Mirtha Benjamin MD  Visit Type: Phone visit, I have permission to talk to her      Subjective    HPI  74 year old woman with hx of HTN, anxiety, who is referred for leukopenia, her labs on 6/30/ 2021 and on 8/18/21 showed wbc of 2.4K and  2.6K respectively, ANC was 1.20 and 1.12 respectively, there were 1% atypical lymphs on the first differential. Hg was normal at 12.1 g/dl and platelets normal at 176. B12 level was 288.   Evaluation to date showed labs 6/26/24 WBC 2.5, hgb 9.6 and plt 216 with normal flow cytometry 2021.  Labs 2/22/24 showed IgG 2360, O2fgtfymlqaukwl 4.2, kappa 3.95, ratio 2.8, monoclonal spike 1.3 and bone marrow biopsy that showed 10-15% plasma cells.  PET scan was normal 2/23/24.  B12 was 344  and she is on supplements  She had a traumatic accident with several fractured spinal vertebra for which she has been incapacitated.   interval history -4/2/2024   She is status post endomyocardial biopsy which did not show any myeloid deposition or other significant pathology     Current WBC 3.8 hgb 8.8 improved to 10 and plt 210.  Her B12 has improved from 288 to 848 with supplements.  Her current IgG 2790 kappa 3.78,  monoclonal spike 1.3, BUN/crt 33/1.09 and calcium 9.3.   She continues to be treated for prior cervical spine fractures and may require upcoming surgery.   She was found to be positive for EBV and CMV.  She is on Xarelto for atrial fibrillation and is followed by cardiology for this as well as syncope and hypoxia.   She has had nerve transplant and had some issues after this procedure        had her colonoscopy done march 2022 which was unremarkable      Review of Systems   Constitutional:  Positive for fatigue. Negative for diaphoresis and fever.   HENT:   Negative for hearing loss, lump/mass, mouth sores and nosebleeds.    Eyes:   Negative for eye problems.   Respiratory:  Negative for chest tightness, cough, hemoptysis and shortness of breath.    Cardiovascular:  Negative for chest pain, leg swelling and palpitations.   Gastrointestinal:  Negative for abdominal distention and abdominal pain.   Musculoskeletal:  Negative for arthralgias, back pain, gait problem and myalgias.   Skin: Negative.  Negative for itching.   Neurological:  Positive for dizziness and light-headedness. Negative for extremity weakness, gait problem and headaches.   Hematological:  Negative for adenopathy. Does not bruise/bleed easily.      Objective   BSA: There is no height or weight on file to calculate BSA.  There were no vitals taken for this visit.     has a past medical history of Anxiety disorder, unspecified (09/26/2022), Encounter for general adult medical examination without abnormal findings (07/07/2020), Essential (primary) hypertension (09/26/2022), and Multiple myeloma.   has a past surgical history that includes Other surgical history (03/30/2020); Other surgical history (03/30/2020); Other surgical history (03/30/2020); Other surgical history (06/08/2020); Cardiac catheterization (N/A, 03/06/2024); Cardiac catheterization (N/A, 03/06/2024); and biopsy.  Family History   Problem Relation Name Age of Onset    Coronary artery disease Mother      Hyperlipidemia Mother      Hypertension Mother      Colon cancer Mother      Heart attack Mother      Thyroid disease Mother      Thyroid cancer Son      Colon cancer Other           Mirella Renae  reports that she has never smoked. She has never used smokeless tobacco.  She  reports that she does not currently use alcohol.  She  reports no history of drug use.        WBC   Date/Time Value Ref Range Status   02/25/2025 11:47 AM 3.0 (L) 4.4 - 11.3 x10*3/uL Final   01/31/2025 07:07 AM 3.8 (L) 4.4 - 11.3 x10*3/uL Final   12/30/2024 10:14 AM 3.0 (L) 4.4 - 11.3 x10*3/uL Final     [facility]   Date Value Ref Range Status  "  02/25/2025 0.0 0.0 - 0.0 /100 WBCs Final   01/31/2025 0.0 0.0 - 0.0 /100 WBCs Final   12/30/2024 0.0 0.0 - 0.0 /100 WBCs Final     RBC   Date Value Ref Range Status   02/25/2025 3.34 (L) 4.00 - 5.20 x10*6/uL Final   01/31/2025 2.90 (L) 4.00 - 5.20 x10*6/uL Final   12/30/2024 3.96 (L) 4.00 - 5.20 x10*6/uL Final     Hemoglobin   Date Value Ref Range Status   02/25/2025 10.5 (L) 12.0 - 16.0 g/dL Final   01/31/2025 8.8 (L) 12.0 - 16.0 g/dL Final   12/30/2024 12.2 12.0 - 16.0 g/dL Final     Hematocrit   Date Value Ref Range Status   02/25/2025 33.2 (L) 36.0 - 46.0 % Final   01/31/2025 27.7 (L) 36.0 - 46.0 % Final   12/30/2024 38.0 36.0 - 46.0 % Final     MCV   Date/Time Value Ref Range Status   02/25/2025 11:47 AM 99 80 - 100 fL Final   01/31/2025 07:07 AM 96 80 - 100 fL Final   12/30/2024 10:14 AM 96 80 - 100 fL Final     MCH   Date/Time Value Ref Range Status   02/25/2025 11:47 AM 31.4 26.0 - 34.0 pg Final   01/31/2025 07:07 AM 30.3 26.0 - 34.0 pg Final   12/30/2024 10:14 AM 30.8 26.0 - 34.0 pg Final     MCHC   Date/Time Value Ref Range Status   02/25/2025 11:47 AM 31.6 (L) 32.0 - 36.0 g/dL Final   01/31/2025 07:07 AM 31.8 (L) 32.0 - 36.0 g/dL Final   12/30/2024 10:14 AM 32.1 32.0 - 36.0 g/dL Final     RDW   Date/Time Value Ref Range Status   02/25/2025 11:47 AM 16.3 (H) 11.5 - 14.5 % Final   01/31/2025 07:07 AM 12.8 11.5 - 14.5 % Final   12/30/2024 10:14 AM 13.2 11.5 - 14.5 % Final     Platelets   Date/Time Value Ref Range Status   02/25/2025 11:47  150 - 450 x10*3/uL Final   01/31/2025 07:07  150 - 450 x10*3/uL Final   12/30/2024 10:14  150 - 450 x10*3/uL Final     No results found for: \"MPV\"  Neutrophils %   Date/Time Value Ref Range Status   02/25/2025 11:47 AM 57.8 40.0 - 80.0 % Final   12/30/2024 10:14 AM 50.8 40.0 - 80.0 % Final   09/30/2024 09:43 AM 45.9 40.0 - 80.0 % Final     Immature Granulocytes %, Automated   Date/Time Value Ref Range Status   02/25/2025 11:47 AM 0.3 0.0 - 0.9 % Final "     Comment:     Immature Granulocyte Count (IG) includes promyelocytes, myelocytes and metamyelocytes but does not include bands. Percent differential counts (%) should be interpreted in the context of the absolute cell counts (cells/UL).   12/30/2024 10:14 AM 0.0 0.0 - 0.9 % Final     Comment:     Immature Granulocyte Count (IG) includes promyelocytes, myelocytes and metamyelocytes but does not include bands. Percent differential counts (%) should be interpreted in the context of the absolute cell counts (cells/UL).   09/30/2024 09:43 AM 0.4 0.0 - 0.9 % Final     Comment:     Immature Granulocyte Count (IG) includes promyelocytes, myelocytes and metamyelocytes but does not include bands. Percent differential counts (%) should be interpreted in the context of the absolute cell counts (cells/UL).     Lymphocytes %   Date/Time Value Ref Range Status   02/25/2025 11:47 AM 28.4 13.0 - 44.0 % Final   12/30/2024 10:14 AM 36.4 13.0 - 44.0 % Final   09/30/2024 09:43 AM 38.9 13.0 - 44.0 % Final     Monocytes %   Date/Time Value Ref Range Status   02/25/2025 11:47 AM 11.8 2.0 - 10.0 % Final   12/30/2024 10:14 AM 10.8 2.0 - 10.0 % Final   09/30/2024 09:43 AM 10.7 2.0 - 10.0 % Final     Eosinophils %   Date/Time Value Ref Range Status   02/25/2025 11:47 AM 1.0 0.0 - 6.0 % Final   12/30/2024 10:14 AM 1.3 0.0 - 6.0 % Final   09/30/2024 09:43 AM 2.9 0.0 - 6.0 % Final     Basophils %   Date/Time Value Ref Range Status   02/25/2025 11:47 AM 0.7 0.0 - 2.0 % Final   12/30/2024 10:14 AM 0.7 0.0 - 2.0 % Final   09/30/2024 09:43 AM 1.2 0.0 - 2.0 % Final     Neutrophils Absolute   Date/Time Value Ref Range Status   02/25/2025 11:47 AM 1.71 1.60 - 5.50 x10*3/uL Final     Comment:     Percent differential counts (%) should be interpreted in the context of the absolute cell counts (cells/uL).   12/30/2024 10:14 AM 1.51 (L) 1.60 - 5.50 x10*3/uL Final     Comment:     Percent differential counts (%) should be interpreted in the context of the  absolute cell counts (cells/uL).   09/30/2024 09:43 AM 1.12 (L) 1.60 - 5.50 x10*3/uL Final     Comment:     Percent differential counts (%) should be interpreted in the context of the absolute cell counts (cells/uL).     Immature Granulocytes Absolute, Automated   Date/Time Value Ref Range Status   02/25/2025 11:47 AM 0.01 0.00 - 0.50 x10*3/uL Final   12/30/2024 10:14 AM 0.00 0.00 - 0.50 x10*3/uL Final   09/30/2024 09:43 AM 0.01 0.00 - 0.50 x10*3/uL Final     Lymphocytes Absolute   Date/Time Value Ref Range Status   02/25/2025 11:47 AM 0.84 0.80 - 3.00 x10*3/uL Final   12/30/2024 10:14 AM 1.08 0.80 - 3.00 x10*3/uL Final   09/30/2024 09:43 AM 0.95 0.80 - 3.00 x10*3/uL Final     Monocytes Absolute   Date/Time Value Ref Range Status   02/25/2025 11:47 AM 0.35 0.05 - 0.80 x10*3/uL Final   12/30/2024 10:14 AM 0.32 0.05 - 0.80 x10*3/uL Final   09/30/2024 09:43 AM 0.26 0.05 - 0.80 x10*3/uL Final     Eosinophils Absolute   Date/Time Value Ref Range Status   02/25/2025 11:47 AM 0.03 0.00 - 0.40 x10*3/uL Final   12/30/2024 10:14 AM 0.04 0.00 - 0.40 x10*3/uL Final   09/30/2024 09:43 AM 0.07 0.00 - 0.40 x10*3/uL Final     Basophils Absolute   Date/Time Value Ref Range Status   02/25/2025 11:47 AM 0.02 0.00 - 0.10 x10*3/uL Final   12/30/2024 10:14 AM 0.02 0.00 - 0.10 x10*3/uL Final   09/30/2024 09:43 AM 0.03 0.00 - 0.10 x10*3/uL Final         Assessment/Plan    1. Smoldering multiple myeloma   bone marrow biopsy showed 10-15% plasma cells   no evidence of end organ damage , PET scan 2/23/24 without any bone lesions   low risk SMM based on current M spike stabel at 1.3 , FLC and PC percentage   plan for surveillance , the repeat labs demonstrate overall stability of her counts as well as of the paraprotein levels   We will need to repeat and follow these levels        4/28/23- she has noticed increasing dizziness episodes   additionally secondary to her arrythmia, cardiology suggested doing a cardiac MRI but she cannot secondary  to marked claustrophobia and  cardiac biopsy 3/6/24 negative for congo red     4/2/2024-   Patient is status post endomyocardial biopsy which did not show any myeloid deposition, she will follow-up with cardiology for discussion of the results later this week  At this time her labs seem to be stable and no discernible end-organ damage, we discussed to repeat a bone marrow biopsy, but she prefers to defer at this time especially if her labs remain stable, we will plan to repeat labs in 3 to 4 months     2. Leukopenia with mild neutropenia   differential diagnosis and work up discussed with patient   plan to obtain the following testing   -nutritional deficiencies: check folic acid and start PO b12 for borderline level   she has improved B12 levels on B12 supplement  -peripheral blood flow cytometry for phenotypic abnormalities or abnormal cell population  Myeloid malignancy and BCR/ABL negative  -viral testing hep and HIV negative, but positive for CMV and EBV     3. dizziness, fatigue   at this time we will refer to neurology for autonomic dysfunction evaluation has not done this yet  she additionally has A flutter and could potentially be contributing to symptoms      4.  Abnormal thyroid uptake on the PET scan  Will order ultrasound     4/2/2024-ultrasound of the thyroid showed multiple TI-RADS 4 nodules, will refer to ENT for consideration of FNA of the largest nodule     RTC 3 m with labs     Diagnoses and all orders for this visit:  Smoldering multiple myeloma  -     Clinic Appointment Request  -     CBC and Auto Differential; Future  -     Comprehensive Metabolic Panel; Future  -     Vitamin B12; Future  -     Immunoglobulins (IgG, IgA, IgM); Future  -     [place]ils/Lambda Free Light Chain, Serum; Future  -     Serum Protein Electrophoresis + Immunofixation; Future  -     Clinic Appointment Request; Future           Celi Lyons PA-C

## 2025-03-17 ENCOUNTER — TELEPHONE (OUTPATIENT)
Age: 75
End: 2025-03-17
Payer: MEDICARE

## 2025-03-17 NOTE — PROGRESS NOTES
Subjective     Mirella Renae is a 74 y.o. year old female with PMHx of HTN, HLD, CAD, persistent atrial fibrillation, smoldering myeloma, and history of syncope referred by cardiology for autonomic evaluation for possible orthostatic hypotension.    HPI  Per chart review:  Patient fell off her deck (leaned back against the railing) in May of 2024 sustaining severe injuries including: spinal cord contusion, C7/T1/T2 transverse process fractures, R L2 TP fracture, and brachial plexus contusion. She underwent AC diskectomy C5-C7 as well as insertion of intervertebral biomechanical prosthetic cage. Noted to have no use of her left arm by PCP note in June 2024 (3/5 strength per June NSGY note), as well as numbness and tingling. EMG in October 2024 shows evidence of L brachial plexopathy in the upper and middle trunks as well as patchy lower trunk involvement.     She follows closely with cardiology. She saw cardiology in November 2024 with concerns of dizziness and syncope. She notably also had a endomyocardial biopsy that is negative for cardiac amyloid. At that visit, she reports syncopal episode in May when refilling her bird feeder, and syncope while walking around the grocery store in July as well as a similar episode in August. 30-day heart monitor from June - July 2024 showed she was in afib 100% of the time with 1 episode of NSVT (9 beats). ROS + lightheadedness and SOB. Full cardiac work-up with Lexiscan showed normal perfusion, and carotid duplex US showed <50% stenosis of carotids bilaterally. She remains rate-controlled.     Of note, patient underwent prolonged left nerve transplant surgery (~9 hrs) at [facility] 1/24/25 and was discharged home same-day but then had a syncopal episode ( and son caught pt, pt was out for ~2 minutes). She was noted to desat to 84% and needed NC. She was admitted to the hospital for a few days after this where orthostatic vitals were positive. Hydrochlorothiazide dose was then  decreased from 25 mg to 12.5 mg at her cardiology follow-up in March.    On interview:    Review of Systems    Patient Active Problem List   Diagnosis    Anxiety disorder    Essential hypertension    Mixed connective tissue disease (Multi)    Smoldering multiple myeloma    Multiple myeloma not having achieved remission (Multi)    Other restrictive cardiomyopathy    Inflammatory polyarthropathy (Multi)    Injury of left brachial plexus    Syncope and collapse    Dizziness    Longstanding persistent atrial fibrillation (Multi)    Dyspnea on exertion    High risk medication use    Hypoxia    Muscle weakness (generalized)    Hypoxemia    Hyperlipidemia    Atherosclerotic heart disease of native coronary artery without angina pectoris     Past Medical History:   Diagnosis Date    Anxiety disorder, unspecified 09/26/2022    Anxiety disorder    Encounter for general adult medical examination without abnormal findings 07/07/2020    Medicare annual wellness visit, initial    Essential (primary) hypertension 09/26/2022    Hypertension    Multiple myeloma      Past Surgical History:   Procedure Laterality Date    BIOPSY      CARDIAC CATHETERIZATION N/A 03/06/2024    Procedure: Endomyocardial Biopsy;  Surgeon: Carlos Calixto DO;  Location: [place] [address] Cardiac Cath Lab;  Service: Cardiovascular;  Laterality: N/A;    CARDIAC CATHETERIZATION N/A 03/06/2024    Procedure: Right Heart Cath;  Surgeon: Carlos Calixto DO;  Location: [place] [address] Cardiac Cath Lab;  Service: Cardiovascular;  Laterality: N/A;    OTHER SURGICAL HISTORY  03/30/2020    Appendectomy    OTHER SURGICAL HISTORY  03/30/2020    Tubal ligation    OTHER SURGICAL HISTORY  03/30/2020    Tumor excision    OTHER SURGICAL HISTORY  06/08/2020    Colonoscopy     Social History     Tobacco Use    Smoking status: Never    Smokeless tobacco: Never   Substance Use Topics    Alcohol use: Not Currently     family history includes Colon cancer in her mother and  another family member; Coronary artery disease in her mother; Heart attack in her mother; Hyperlipidemia in her mother; Hypertension in her mother; Thyroid cancer in her son; Thyroid disease in her mother.    Current Outpatient Medications:     acetaminophen (Tylenol) 500 mg tablet, Take 2 tablets (1,000 mg) by mouth every 6 hours if needed for mild pain (1 - 3)., Disp: , Rfl:     cyanocobalamin (Vitamin B-12) 1,000 mcg tablet, Take 1 tablet (1,000 mcg) by mouth once daily., Disp: , Rfl:     hydroCHLOROthiazide (Microzide) 12.5 mg capsule, Take 1 capsule (12.5 mg) by mouth once daily., Disp: 90 capsule, Rfl: 3    hydroxychloroquine (PlaqueniL) 200 mg tablet, Take 1 Tablet by mouth every day, Disp: 90 tablet, Rfl: 0    metoprolol tartrate (Lopressor) 25 mg tablet, TAKE 1 TABLET TWICE DAILY., Disp: 180 tablet, Rfl: 3    multivitamin tablet, Take 1 tablet by mouth once daily., Disp: , Rfl:     naloxone (Narcan) 4 mg/0.1 mL nasal spray, Administer 1 spray into one nostril for known or suspected opioid overdose. If patient worsens or does not respond, may repeat in 2-3 minutes. ., Disp: , Rfl:     pregabalin (Lyrica) 75 mg capsule, Take 1 capsule (75 mg) by mouth 2 times a day., Disp: 60 capsule, Rfl: 2    rivaroxaban (Xarelto) 20 mg tablet, TAKE ONE TABLET BY MOUTH DAILY, Disp: 90 tablet, Rfl: 3    traMADol (Ultram) 50 mg tablet, Take 1 (one) tablet (50 mg total) by mouth every 8 (eight) hours as needed for pain ., Disp: 15 tablet, Rfl: 0  No Known Allergies    Objective   Neurological Exam  Physical Exam    There were no vitals taken for this visit.     Orthostatic vitals:  Lying:  Standing:  Sitting:    GENERAL APPEARANCE:  No distress, alert and cooperative.     MENTAL STATE:  Orientation was normal to time, place and person. Recent and remote memory was intact.  Attention span and concentration were normal. Language testing was normal for comprehension, repetition, expression, and naming. Calculation was intact. The  patient could correctly interpret a picture, and copy a diagram. General fund of knowledge was intact. Mini-mental status examination was performed with no errors.     CRANIAL NERVES:  Cranial nerves were normal.      CN 2- Visual Acuity  OD: 20/20 (corrected)   OS: 20/20 (corrected); visual fields full to confrontation.      CN 3, 4, 6-  Pupils round, 4 mm in diameter, equally reactive to light. No ptosis. EOMs normal alignment, full range of movement, no nystagmus     CN 5- Facial sensation intact bilaterally. Normal corneal reflexes.      CN 7- Normal and symmetric facial strength. Nasolabial folds symmetric.     CN 8- Hearing intact to finger rub, whisper.      CN 9- Palate elevates symmetrically. Normal gag reflex.      CN 11- Normal strength of shoulder shrug and neck turning      CN 12- Tongue midline, with normal bulk and strength; no fasciculations.     MOTOR:  Motor exam was normal. Muscle bulk and tone were normal in both upper and lower extremities. Muscle strength was 5/5 in distal and proximal muscles in both upper and lower extremities. No fasciculations, tremor or other abnormal movements were present.     REFLEXES:  Right/ Left:  Biceps 2/2, brachioradialis 2/2, triceps 2/2, patellar 2/2, ankle 2/2  Babinski: toes downgoing to plantar stimulation. No clonus, frontal release signs or other pathologic reflexes present.     SENSORY: Sensory exam was intact to light touch, sharp/dull, vibration and position sense in both UE and LE.     COORDINATION: CHICA were intact in upper and lower extremities.  In UE- finger-nose-finger was intact and in LE- heel-to-shin was intact without dysmetria or overshoot.      GAIT: Station was stable with a normal base and negative Romberg sign. Gait was stable with a normal arm swing and speed. No ataxia, shuffling, steppage or waddling was noted. Tandem gait was intact. Postural reflexes were normal.     LABS:  CBC: known leukopenia to 3, Hgb 10.5, plt 221  CMP: wnl  Iron  68, TIBC 283, % sat 24  Ferritin 310  B12 737    IMAGING:  CT 5/18/24:   FINDINGS:   Intracranially, minor background chronic small vessel ischemic changes are again noted. Basal ganglia calcifications are again identified bilaterally. There is no hemorrhage, midline shift, dominant mass lesion or abnormal extra-axial fluid collection noted. The gray-white matter differentiation is otherwise grossly intact.     Right posterior oblique position of the head within the gantry is noted. The mastoid air cells and middle ear cavities are clear.  Mild arthritic changes associated with the temporomandibular articulations are noted again. The paranasal sinuses are essentially clear.  No acute calvarial fracture.     Upper parapharyngeal fat planes are grossly intact. No acute intraorbital abnormality is otherwise noted.     Assessment/Plan   74F with PMHx of HTN, HLD, CAD, persistent atrial fibrillation, smoldering myeloma, and history of syncope referred by cardiology for autonomic evaluation for possible orthostatic hypotension. She has a strong cardiac history though full evaluation for syncope has been negative overall. History notable for ____________. Exam notable for ____________. Differentials include prodromal symptom of neurodegenerative condition such as PD or MSA vs. Primary orthostatic hypotension vs. Dysautonomia.     PLAN:  - Referral for autonomic testing    Viv Mcneal MD  Neurology PGY-2    Patient seen and discussed with  __________

## 2025-03-20 PROCEDURE — RXMED WILLOW AMBULATORY MEDICATION CHARGE

## 2025-03-21 ENCOUNTER — PHARMACY VISIT (OUTPATIENT)
Dept: PHARMACY | Facility: CLINIC | Age: 75
End: 2025-03-21
Payer: COMMERCIAL

## 2025-03-26 ENCOUNTER — APPOINTMENT (OUTPATIENT)
Dept: NEUROLOGY | Facility: HOSPITAL | Age: 75
End: 2025-03-26
Payer: MEDICARE

## 2025-03-27 ENCOUNTER — APPOINTMENT (OUTPATIENT)
Age: 75
End: 2025-03-27
Payer: MEDICARE

## 2025-03-27 VITALS
BODY MASS INDEX: 28.49 KG/M2 | DIASTOLIC BLOOD PRESSURE: 70 MMHG | WEIGHT: 188 LBS | HEART RATE: 76 BPM | HEIGHT: 68 IN | SYSTOLIC BLOOD PRESSURE: 130 MMHG

## 2025-03-27 DIAGNOSIS — S14.3XXD INJURY OF LEFT BRACHIAL PLEXUS, SUBSEQUENT ENCOUNTER: ICD-10-CM

## 2025-03-27 DIAGNOSIS — Z00.00 ROUTINE GENERAL MEDICAL EXAMINATION AT HEALTH CARE FACILITY: ICD-10-CM

## 2025-03-27 DIAGNOSIS — D47.2 SMOLDERING MULTIPLE MYELOMA: ICD-10-CM

## 2025-03-27 DIAGNOSIS — M06.4 INFLAMMATORY POLYARTHROPATHY (MULTI): ICD-10-CM

## 2025-03-27 DIAGNOSIS — I50.32 CHRONIC HEART FAILURE WITH PRESERVED EJECTION FRACTION: Primary | ICD-10-CM

## 2025-03-27 DIAGNOSIS — M35.1 MIXED CONNECTIVE TISSUE DISEASE (MULTI): ICD-10-CM

## 2025-03-27 PROBLEM — R06.09 DYSPNEA ON EXERTION: Status: RESOLVED | Noted: 2024-11-11 | Resolved: 2025-03-27

## 2025-03-27 PROBLEM — R55 SYNCOPE AND COLLAPSE: Status: RESOLVED | Noted: 2024-11-11 | Resolved: 2025-03-27

## 2025-03-27 PROBLEM — R09.02 HYPOXIA: Status: RESOLVED | Noted: 2025-01-26 | Resolved: 2025-03-27

## 2025-03-27 PROBLEM — R09.02 HYPOXEMIA: Status: RESOLVED | Noted: 2025-01-29 | Resolved: 2025-03-27

## 2025-03-27 PROBLEM — C90.00 MULTIPLE MYELOMA NOT HAVING ACHIEVED REMISSION (MULTI): Status: RESOLVED | Noted: 2023-09-25 | Resolved: 2025-03-27

## 2025-03-27 PROBLEM — R42 DIZZINESS: Status: RESOLVED | Noted: 2024-11-11 | Resolved: 2025-03-27

## 2025-03-27 PROCEDURE — 3075F SYST BP GE 130 - 139MM HG: CPT | Performed by: FAMILY MEDICINE

## 2025-03-27 PROCEDURE — G2211 COMPLEX E/M VISIT ADD ON: HCPCS | Performed by: FAMILY MEDICINE

## 2025-03-27 PROCEDURE — 99214 OFFICE O/P EST MOD 30 MIN: CPT | Performed by: FAMILY MEDICINE

## 2025-03-27 PROCEDURE — 1036F TOBACCO NON-USER: CPT | Performed by: FAMILY MEDICINE

## 2025-03-27 PROCEDURE — 3008F BODY MASS INDEX DOCD: CPT | Performed by: FAMILY MEDICINE

## 2025-03-27 PROCEDURE — 1170F FXNL STATUS ASSESSED: CPT | Performed by: FAMILY MEDICINE

## 2025-03-27 PROCEDURE — G0439 PPPS, SUBSEQ VISIT: HCPCS | Performed by: FAMILY MEDICINE

## 2025-03-27 PROCEDURE — 1123F ACP DISCUSS/DSCN MKR DOCD: CPT | Performed by: FAMILY MEDICINE

## 2025-03-27 PROCEDURE — 1160F RVW MEDS BY RX/DR IN RCRD: CPT | Performed by: FAMILY MEDICINE

## 2025-03-27 PROCEDURE — 3078F DIAST BP <80 MM HG: CPT | Performed by: FAMILY MEDICINE

## 2025-03-27 PROCEDURE — 1159F MED LIST DOCD IN RCRD: CPT | Performed by: FAMILY MEDICINE

## 2025-03-27 ASSESSMENT — ACTIVITIES OF DAILY LIVING (ADL)
DOING_HOUSEWORK: NEEDS ASSISTANCE
DRESSING: INDEPENDENT
GROCERY_SHOPPING: INDEPENDENT
TAKING_MEDICATION: INDEPENDENT
BATHING: INDEPENDENT
MANAGING_FINANCES: INDEPENDENT

## 2025-03-27 NOTE — PROGRESS NOTES
"Subjective   Reason for Visit: Mirella Renae is an 74 y.o. female here for a Medicare Wellness visit.     Past Medical, Surgical, and Family History reviewed and updated in chart.    Reviewed all medications by prescribing practitioner or clinical pharmacist (such as prescriptions, OTCs, herbal therapies and supplements) and documented in the medical record.    HPI  Had nerve transplant for left brachial plexus injury , in January.  Nerves from left hand.  States was 9 hour surgery.  Was admitted after for hypoxemia and other complications and had few days in rehab.  Doing much better  Htn, meds adjusted by cardiology , feels better since  CAD, a fib, stable on current regimen  Smoldering multiple myeloma,  sees hem/onc  MCTD, inflammatory polyarthropathy, sees rheum    Patient Care Team:  Mirtha Benjamin MD as PCP - General (Family Medicine)  Mirtha Benjamin MD as PCP - O Medicare Advantage PCP     Review of Systems   All other systems reviewed and are negative.      Objective   Vitals:  /70 (BP Location: Right arm, Patient Position: Sitting)   Pulse 76   Ht 1.727 m (5' 8\")   Wt 85.3 kg (188 lb)   BMI 28.59 kg/m²       Physical Exam  Vitals and nursing note reviewed.   Constitutional:       General: She is not in acute distress.     Appearance: Normal appearance. She is not toxic-appearing.   HENT:      Head: Normocephalic and atraumatic.   Cardiovascular:      Rate and Rhythm: Normal rate and regular rhythm.      Heart sounds: No murmur heard.  Pulmonary:      Effort: Pulmonary effort is normal.      Breath sounds: Normal breath sounds.   Musculoskeletal:      Cervical back: Neck supple. No rigidity.      Comments:     Skin:     General: Skin is warm and dry.   Neurological:      General: No focal deficit present.      Mental Status: She is alert and oriented to person, place, and time.   Psychiatric:         Mood and Affect: Mood normal.         Assessment & Plan  Chronic heart failure with preserved " ejection fraction         Inflammatory polyarthropathy (Multi)         Injury of left brachial plexus, subsequent encounter         Smoldering multiple myeloma         Mixed connective tissue disease (Multi)         Routine general medical examination at health care facility       Continue current care.  Followup six months, no labs, call concerns.

## 2025-03-31 ENCOUNTER — PHARMACY VISIT (OUTPATIENT)
Dept: PHARMACY | Facility: CLINIC | Age: 75
End: 2025-03-31
Payer: COMMERCIAL

## 2025-03-31 PROCEDURE — RXMED WILLOW AMBULATORY MEDICATION CHARGE

## 2025-04-07 ENCOUNTER — HOSPITAL ENCOUNTER (EMERGENCY)
Facility: HOSPITAL | Age: 75
Discharge: HOME | End: 2025-04-07
Payer: MEDICARE

## 2025-04-07 VITALS
WEIGHT: 183 LBS | RESPIRATION RATE: 16 BRPM | DIASTOLIC BLOOD PRESSURE: 80 MMHG | HEART RATE: 91 BPM | BODY MASS INDEX: 28.72 KG/M2 | TEMPERATURE: 98.3 F | HEIGHT: 67 IN | SYSTOLIC BLOOD PRESSURE: 153 MMHG | OXYGEN SATURATION: 98 %

## 2025-04-07 DIAGNOSIS — R04.0 LEFT-SIDED EPISTAXIS: Primary | ICD-10-CM

## 2025-04-07 PROCEDURE — 2500000005 HC RX 250 GENERAL PHARMACY W/O HCPCS: Performed by: NURSE PRACTITIONER

## 2025-04-07 PROCEDURE — 30901 CONTROL OF NOSEBLEED: CPT | Mod: LT

## 2025-04-07 PROCEDURE — 99282 EMERGENCY DEPT VISIT SF MDM: CPT

## 2025-04-07 RX ORDER — LIDOCAINE HYDROCHLORIDE 20 MG/ML
1.25 SOLUTION OROPHARYNGEAL ONCE
Status: COMPLETED | OUTPATIENT
Start: 2025-04-07 | End: 2025-04-07

## 2025-04-07 RX ADMIN — LIDOCAINE HYDROCHLORIDE 1.25 ML: 20 SOLUTION ORAL at 20:47

## 2025-04-07 RX ADMIN — PHENYLEPHRINE HYDROCHLORIDE 2 SPRAY: 0.25 SPRAY NASAL at 20:47

## 2025-04-07 ASSESSMENT — PAIN SCALES - GENERAL: PAINLEVEL_OUTOF10: 0 - NO PAIN

## 2025-04-07 ASSESSMENT — PAIN - FUNCTIONAL ASSESSMENT: PAIN_FUNCTIONAL_ASSESSMENT: 0-10

## 2025-04-08 NOTE — ED PROVIDER NOTES
Chief Complaint   Patient presents with    Epistaxis (Nose Bleed)     Reports nosebleed since 1500, is on Xarelto. States she seen her PCP last week for like issues but none lasting to this extent        Patient History    Past Medical History:   Diagnosis Date    Anxiety disorder, unspecified 09/26/2022    Anxiety disorder    Atrial fibrillation (Multi)     Encounter for general adult medical examination without abnormal findings 07/07/2020    Medicare annual wellness visit, initial    Essential (primary) hypertension 09/26/2022    Hypertension    Multiple myeloma       Past Surgical History:   Procedure Laterality Date    BIOPSY      CARDIAC CATHETERIZATION N/A 03/06/2024    Procedure: Endomyocardial Biopsy;  Surgeon: Carlos Calixto DO;  Location: [place] [address] Cardiac [facility];  Service: Cardiovascular;  Laterality: N/A;    CARDIAC CATHETERIZATION N/A 03/06/2024    Procedure: Right Heart Cath;  Surgeon: Carlos Calixto DO;  Location: [place] [address] Cardiac Cath Lab;  Service: Cardiovascular;  Laterality: N/A;    OTHER SURGICAL HISTORY  03/30/2020    Appendectomy    OTHER SURGICAL HISTORY  03/30/2020    Tubal ligation    OTHER SURGICAL HISTORY  03/30/2020    Tumor excision    OTHER SURGICAL HISTORY  06/08/2020    Colonoscopy      Family History   Problem Relation Name Age of Onset    Coronary artery disease Mother      Hyperlipidemia Mother      Hypertension Mother      Colon cancer Mother      Heart attack Mother      Thyroid disease Mother      Thyroid cancer Son      Colon cancer Other        Social History     Social History Narrative    Not on file      No Known Allergies     PMH: Reviewed  PSH: Reviewed  Social History: Reviewed.   Allergies reviewed.     HPI: Mirella Renae is a 74 y.o. female who presents to the ED today accompanied by  her  with complaints of nosebleed.  Patient states her nosebleed started around 3:00 this afternoon, slows down at times but never fully stops.  Is on  Xarelto, denies nasal injury or trauma.  Has had nosebleeds in the past but nothing to this extent.  Thought initially they were because of dryness in the home and she was advised to use nasal saline.  States that the nosebleed stopped so she had not started using nasal saline yet.  Nuys feeling dizzy or lightheaded.    PHYSICAL EXAM:    GENERAL: Vitals noted, no distress. Alert and oriented x 3. Non-toxic.       HEAD: Normocephalic, atraumatic. Pupils equally round and reactive to light. EOMI. bright red blood noted in the left naris, lesser extent noted in the right naris.  Blood noted in the posterior oropharynx as well.    NECK: Supple. No midline or paraspinal tenderness through full range of motion.      CARDIAC: Regular rate, rhythm. No murmurs or rubs.    RESPIRATORY: Lungs clear and equal bilaterally. No respiratory distress.     MUSCULOSKELETAL & SKIN:  Warm, dry, and intact. No rash/lesions. No peripheral edema.     NEURO: No focal neurologic deficits, acting appropriately.     Labs Reviewed - No data to display     No orders to display        Medical Decision Making         ED COURSE: This patient was seen and examined by myself independently.  Nose packed with Arnav-Synephrine and viscous lidocaine soaked cotton. Packing removed after about 45 minutes and large blood clot evacuated from the left nares. Additional sprays of Arnav-Synephrine placed in the left nares. Continues with bleeding and has a 5.5cm anterior rhino-rocket placed after soaking it in viscous lidocaine for comfort. She tolerated this well with 3cc air instilled. Will have her follow up with Dr Tesfaye in 3-4 days for removal and re-evaluation. She is discharged home in a stable condition with computer instructions given and is encouraged to return to the ER for any new or worsening symptoms.       DIAGNOSTIC IMPRESSION: #1 nosebleed     Jaz LANDRY Hooks, APRN-CNP  04/07/25 2205

## 2025-04-10 ENCOUNTER — APPOINTMENT (OUTPATIENT)
Dept: CARDIOLOGY | Facility: CLINIC | Age: 75
End: 2025-04-10
Payer: MEDICARE

## 2025-04-21 ENCOUNTER — PHARMACY VISIT (OUTPATIENT)
Dept: PHARMACY | Facility: CLINIC | Age: 75
End: 2025-04-21
Payer: COMMERCIAL

## 2025-04-21 PROCEDURE — RXMED WILLOW AMBULATORY MEDICATION CHARGE

## 2025-05-23 ENCOUNTER — PHARMACY VISIT (OUTPATIENT)
Dept: PHARMACY | Facility: CLINIC | Age: 75
End: 2025-05-23
Payer: COMMERCIAL

## 2025-05-23 DIAGNOSIS — S14.3XXA INJURY OF BRACHIAL PLEXUS, INITIAL ENCOUNTER: ICD-10-CM

## 2025-05-23 PROCEDURE — RXMED WILLOW AMBULATORY MEDICATION CHARGE

## 2025-05-23 RX ORDER — PREGABALIN 75 MG/1
75 CAPSULE ORAL 2 TIMES DAILY
Qty: 60 CAPSULE | Refills: 2 | Status: SHIPPED | OUTPATIENT
Start: 2025-05-23

## 2025-05-30 ENCOUNTER — LAB (OUTPATIENT)
Dept: LAB | Facility: HOSPITAL | Age: 75
End: 2025-05-30
Payer: MEDICARE

## 2025-05-30 ENCOUNTER — APPOINTMENT (OUTPATIENT)
Dept: LAB | Facility: HOSPITAL | Age: 75
End: 2025-05-30
Payer: MEDICARE

## 2025-05-30 ENCOUNTER — PHARMACY VISIT (OUTPATIENT)
Dept: PHARMACY | Facility: CLINIC | Age: 75
End: 2025-05-30
Payer: COMMERCIAL

## 2025-05-30 LAB
ALBUMIN SERPL BCP-MCNC: 4 G/DL (ref 3.4–5)
ALP SERPL-CCNC: 61 U/L (ref 33–136)
ALT SERPL W P-5'-P-CCNC: 9 U/L (ref 7–45)
ANION GAP SERPL CALC-SCNC: 12 MMOL/L (ref 10–20)
AST SERPL W P-5'-P-CCNC: 12 U/L (ref 9–39)
BASOPHILS # BLD AUTO: 0.03 X10*3/UL (ref 0–0.1)
BASOPHILS NFR BLD AUTO: 1.2 %
BILIRUB SERPL-MCNC: 0.7 MG/DL (ref 0–1.2)
BUN SERPL-MCNC: 32 MG/DL (ref 6–23)
CALCIUM SERPL-MCNC: 8.8 MG/DL (ref 8.6–10.3)
CHLORIDE SERPL-SCNC: 108 MMOL/L (ref 98–107)
CO2 SERPL-SCNC: 23 MMOL/L (ref 21–32)
CREAT SERPL-MCNC: 1.19 MG/DL (ref 0.5–1.05)
EGFRCR SERPLBLD CKD-EPI 2021: 48 ML/MIN/1.73M*2
EOSINOPHIL # BLD AUTO: 0.05 X10*3/UL (ref 0–0.4)
EOSINOPHIL NFR BLD AUTO: 2 %
ERYTHROCYTE [DISTWIDTH] IN BLOOD BY AUTOMATED COUNT: 13.6 % (ref 11.5–14.5)
FERRITIN SERPL-MCNC: 157 NG/ML (ref 8–150)
GLUCOSE SERPL-MCNC: 115 MG/DL (ref 74–99)
HCT VFR BLD AUTO: 33.9 % (ref 36–46)
HGB BLD-MCNC: 10.6 G/DL (ref 12–16)
IGA SERPL-MCNC: 44 MG/DL (ref 70–400)
IGG SERPL-MCNC: 2570 MG/DL (ref 700–1600)
IGM SERPL-MCNC: 50 MG/DL (ref 40–230)
IMM GRANULOCYTES # BLD AUTO: 0.01 X10*3/UL (ref 0–0.5)
IMM GRANULOCYTES NFR BLD AUTO: 0.4 % (ref 0–0.9)
IRON SATN MFR SERPL: 20 % (ref 25–45)
IRON SERPL-MCNC: 63 UG/DL (ref 35–150)
LYMPHOCYTES # BLD AUTO: 0.87 X10*3/UL (ref 0.8–3)
LYMPHOCYTES NFR BLD AUTO: 35.5 %
MCH RBC QN AUTO: 31.5 PG (ref 26–34)
MCHC RBC AUTO-ENTMCNC: 31.3 G/DL (ref 32–36)
MCV RBC AUTO: 101 FL (ref 80–100)
MONOCYTES # BLD AUTO: 0.27 X10*3/UL (ref 0.05–0.8)
MONOCYTES NFR BLD AUTO: 11 %
NEUTROPHILS # BLD AUTO: 1.22 X10*3/UL (ref 1.6–5.5)
NEUTROPHILS NFR BLD AUTO: 49.9 %
NRBC BLD-RTO: 0 /100 WBCS (ref 0–0)
PLATELET # BLD AUTO: 142 X10*3/UL (ref 150–450)
POTASSIUM SERPL-SCNC: 4.1 MMOL/L (ref 3.5–5.3)
PROT SERPL-MCNC: 7.3 G/DL (ref 6.4–8.2)
PROT SERPL-MCNC: 7.8 G/DL (ref 6.4–8.2)
RBC # BLD AUTO: 3.36 X10*6/UL (ref 4–5.2)
SODIUM SERPL-SCNC: 139 MMOL/L (ref 136–145)
TIBC SERPL-MCNC: 310 UG/DL (ref 240–445)
TSH SERPL-ACNC: 1.66 MIU/L (ref 0.44–3.98)
UIBC SERPL-MCNC: 247 UG/DL (ref 110–370)
VIT B12 SERPL-MCNC: 1000 PG/ML (ref 211–911)
WBC # BLD AUTO: 2.5 X10*3/UL (ref 4.4–11.3)

## 2025-05-30 PROCEDURE — 83550 IRON BINDING TEST: CPT

## 2025-05-30 PROCEDURE — 80053 COMPREHEN METABOLIC PANEL: CPT

## 2025-05-30 PROCEDURE — 84155 ASSAY OF PROTEIN SERUM: CPT

## 2025-05-30 PROCEDURE — 84165 PROTEIN E-PHORESIS SERUM: CPT

## 2025-05-30 PROCEDURE — 82728 ASSAY OF FERRITIN: CPT

## 2025-05-30 PROCEDURE — 85025 COMPLETE CBC W/AUTO DIFF WBC: CPT

## 2025-05-30 PROCEDURE — 82607 VITAMIN B-12: CPT

## 2025-05-30 PROCEDURE — 86334 IMMUNOFIX E-PHORESIS SERUM: CPT

## 2025-05-30 PROCEDURE — 84443 ASSAY THYROID STIM HORMONE: CPT

## 2025-05-30 PROCEDURE — RXMED WILLOW AMBULATORY MEDICATION CHARGE

## 2025-05-30 PROCEDURE — 82784 ASSAY IGA/IGD/IGG/IGM EACH: CPT

## 2025-05-30 PROCEDURE — 83540 ASSAY OF IRON: CPT

## 2025-05-30 PROCEDURE — 83521 IG LIGHT CHAINS FREE EACH: CPT

## 2025-06-01 LAB
KAPPA LC SERPL-MCNC: 4.47 MG/DL (ref 0.33–1.94)
KAPPA LC/LAMBDA SER: 3.44 {RATIO} (ref 0.26–1.65)
LAMBDA LC SERPL-MCNC: 1.3 MG/DL (ref 0.57–2.63)

## 2025-06-02 ENCOUNTER — APPOINTMENT (OUTPATIENT)
Dept: CARDIOLOGY | Facility: CLINIC | Age: 75
End: 2025-06-02
Payer: MEDICARE

## 2025-06-02 VITALS
DIASTOLIC BLOOD PRESSURE: 74 MMHG | BODY MASS INDEX: 30.64 KG/M2 | SYSTOLIC BLOOD PRESSURE: 140 MMHG | HEIGHT: 67 IN | OXYGEN SATURATION: 100 % | HEART RATE: 73 BPM | WEIGHT: 195.2 LBS

## 2025-06-02 DIAGNOSIS — I48.11 LONGSTANDING PERSISTENT ATRIAL FIBRILLATION (MULTI): ICD-10-CM

## 2025-06-02 DIAGNOSIS — I50.32 CHRONIC HEART FAILURE WITH PRESERVED EJECTION FRACTION: ICD-10-CM

## 2025-06-02 DIAGNOSIS — I10 ESSENTIAL HYPERTENSION: Primary | ICD-10-CM

## 2025-06-02 DIAGNOSIS — Z87.898 HISTORY OF SYNCOPE: ICD-10-CM

## 2025-06-02 PROCEDURE — 1159F MED LIST DOCD IN RCRD: CPT

## 2025-06-02 PROCEDURE — G2211 COMPLEX E/M VISIT ADD ON: HCPCS

## 2025-06-02 PROCEDURE — 1036F TOBACCO NON-USER: CPT

## 2025-06-02 PROCEDURE — 99215 OFFICE O/P EST HI 40 MIN: CPT

## 2025-06-02 PROCEDURE — 3078F DIAST BP <80 MM HG: CPT

## 2025-06-02 PROCEDURE — 3077F SYST BP >= 140 MM HG: CPT

## 2025-06-02 PROCEDURE — 3008F BODY MASS INDEX DOCD: CPT

## 2025-06-02 PROCEDURE — 93000 ELECTROCARDIOGRAM COMPLETE: CPT

## 2025-06-02 NOTE — PROGRESS NOTES
[facility]  Cardiology Clinic Visit Note    History of present illness:  This is a 74 y.o. female with a history of hypertension, atrial fibrillation, status post endomyocardial biopsy that was negative for cardiac amyloid and history of multiple myeloma who presents to the clinic for 6-month follow-up.    May 2024 she was reaching up to fill the bird feeder on her back deck when she reportedly fell and woke up on the ground 15 feet below. Her  found her after estimated time for an hour. She sustained traumatic injuries and was treated at [facility] requiring anterior cervical discectomy. She reports impaired memory for 3 weeks after that event. She remembers feeling very thinner and then remembers waking up on the ground. In July 2024 she passed out while walking into a grocery store. Her  was with her and her walking when she slowed down and stopped and then he proceeded to help lower to the ground. She is not remember the event is unclear whether or not she fully lost consciousness. Similar episode happened in August 2024. She has not had a syncopal/near syncopal episode since.     At her previous visit I referred her to electrophysiology for rhythm control strategies.  She met with electrophysiologist Dr. Ramicone on 3/3/2025 who given the duration of her A-fib and lack of symptoms recommended to pursue rate control strategy he does not believe that her previous symptoms were related to her A-fib.  His impression was that she is dehydrated from her thiazide diuretic and reduced her HCTZ.    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  She was discharged home feeling very groggy later that night suffered a syncopal attack hypoxia and was transferred to [facility] for care.  She was weaned off oxygen and discharged on 1/29/2025.    ED visit on 4/7/2025 for  nosebleed feeling dizzy and lightheaded.    Subjective:  Dizziness with postural changes and activity.     Cardiac Testing  Myocardial SPECT (November 2024): Normal perfusion, no signs of ischemia  Carotid artery duplex study (November 2024): Bilateral internal carotid artery disease with less than 50% stenosis.  Heterogeneous plaque buildup.  Right heart catheterization (March 2024): Endomyocardial biopsy performed.  RA 9, PA 90/18 with a mean of 25, wedge pressure 17 with a V wave of 27, cardiac index 2.5.  30-day event monitor (May 2024): Reported on percent A-fib burden with PVC and PAC burden less than 1%, heart rate range 63 to 105 bpm with an average of 77 bpm.  1 episode of nonsustained VT lasting 9 beats.  Echo (May 2024): LVEF 57% with indeterminate LV diastolic function.  Normal RV size and systolic function.  Normal LA, normal RA.  RVSP 32 mmHg.  No significant valvular disease.  [id number] Score  Age 65-74: 1   Sex Female: 1   CHF History No: 0   HTN Yes: 1   Stroke/TIA/Thromboembolism No: 0   Vascular Dz: CAD/PAD/Aortic Plaque No: 0   DM No: 0   Total Score 3     Assessment and Plan  Paroxysmal atrial fibrillation  -AF Dx History: 11/2023; h/o Cardioversion: No; AAD Use: None; Anticoagulation use: Xarelto 20mg Daily (current); h/o Ablation: no; [id number] Score: 3  - rate controlled afib by ECG today.   - Asymptomatic from an A-fib standpoint  -Denies any adverse bleeding events  -Continue Xarelto 20 mg daily  - Continue Lopressor 25 mg twice daily    ACC stage B chronic heart failure preserved ejection fraction  Primary hypertension  - Moderately elevated filling pressures on right heart cath March 2024  - No heart failure symptoms  -Blood pressure acceptably controlled  - Continue current dose of thiazide diuretic.    History of syncope  - cardiac workup so far has been negative  - Unclear etiology    Return to Care:  Follow up in 6 months or sooner if needed.     Objective  VITALS  Vitals:     06/02/25 1054   BP: 140/74   Pulse: 73   SpO2: 100%       Weight  Vitals:    06/02/25 1054   Weight: 88.5 kg (195 lb 3.2 oz)       Past Medical History  Medical History[1]    Past Surgical History  Surgical History[2]    Medications  Current Outpatient Medications   Medication Instructions    acetaminophen (TYLENOL) 1,000 mg, Every 6 hours PRN    cyanocobalamin (VITAMIN B-12) 1,000 mcg, Daily    hydroCHLOROthiazide (MICROZIDE) 12.5 mg, oral, Daily    hydroxychloroquine (PlaqueniL) 200 mg tablet Take 1 Tablet by mouth every day    metoprolol tartrate (Lopressor) 25 mg tablet TAKE 1 TABLET TWICE DAILY.    multivitamin tablet Take 1 tablet by mouth once daily.    naloxone (Narcan) 4 mg/0.1 mL nasal spray Administer 1 spray into one nostril for known or suspected opioid overdose. If patient worsens or does not respond, may repeat in 2-3 minutes. .    pregabalin (LYRICA) 75 mg, oral, 2 times daily    rivaroxaban (Xarelto) 20 mg tablet TAKE ONE TABLET BY MOUTH DAILY    traMADol (Ultram) 50 mg tablet Take 1 (one) tablet (50 mg total) by mouth every 8 (eight) hours as needed for pain .       Allergies  Allergies[3]    Social History  Social History[4]    Family History  Family History[5]    PHYSICAL EXAM  Physical Exam  Vitals and nursing note reviewed.   Constitutional:       General: She is not in acute distress.  HENT:      Head: Normocephalic and atraumatic.      Mouth/Throat:      Mouth: Mucous membranes are moist.      Pharynx: Oropharynx is clear.   Eyes:      General: No scleral icterus.     Pupils: Pupils are equal, round, and reactive to light.   Cardiovascular:      Rate and Rhythm: Normal rate. Rhythm irregular.      Pulses: Normal pulses.      Heart sounds: Normal heart sounds, S1 normal and S2 normal. No murmur heard.     No friction rub.   Pulmonary:      Effort: Pulmonary effort is normal.      Breath sounds: Normal breath sounds.   Abdominal:      General: Bowel sounds are normal. There is no distension.       Palpations: Abdomen is soft.      Tenderness: There is no abdominal tenderness.   Musculoskeletal:         General: Normal range of motion.      Cervical back: Normal range of motion and neck supple.      Right lower leg: No edema.      Left lower leg: No edema.   Skin:     General: Skin is warm and dry.      Capillary Refill: Capillary refill takes less than 2 seconds.      Findings: No rash.   Neurological:      General: No focal deficit present.      Mental Status: She is alert.   Psychiatric:         Mood and Affect: Mood normal.         Behavior: Behavior normal.         Cardiovascular Labs  Lab Results   Component Value Date    HGB 10.6 (L) 05/30/2025    HGB 10.5 (L) 02/25/2025    HGB 8.8 (L) 01/31/2025     (L) 05/30/2025    WBC 2.5 (L) 05/30/2025     05/30/2025    K 4.1 05/30/2025    CREATININE 1.19 (H) 05/30/2025    CREATININE 0.85 02/25/2025    CREATININE 0.67 01/31/2025    BUN 32 (H) 05/30/2025    CALCIUM 8.8 05/30/2025     (H) 02/22/2024    TROPHS 5 02/22/2024    TROPHS 5 08/11/2023    LDLF 71 09/20/2023       Echocardiogram  Results for orders placed in visit on 07/26/24    Transthoracic Echo Complete       The ASCVD Risk score (Mayur ISLAS, et al., 2019) failed to calculate for the following reasons:    Risk score cannot be calculated because patient has a medical history suggesting prior/existing ASCVD  Low Risk: <5%  Borderline Risk: 5%-7.4%  Intermediate Risk: 7.5% - 19.9%  High Risk: >20%    If your symptoms worsen or progress please go directory to your nearest emergency department for evaluation.     Thank you for this interesting clinical case and allowing me to participate in the care of this patient. Please reach me out if you have any questions or if you need any clarifications regarding this patient's care.    **Disclaimer: This note was dictated by speech recognition, and every effort has been made to prevent any error in transcription, however minor errors may be  present**  ___________________________________________________  Carlos Lee, MSN, CNP, ACNPC, CCRN  Advanced Practice Provider, Nurse Practitioner  Division of Cardiovascular Medicine  [facility]  [facility]         [1]   Past Medical History:  Diagnosis Date    Anxiety disorder, unspecified 09/26/2022    Anxiety disorder    Atrial fibrillation (Multi)     Encounter for general adult medical examination without abnormal findings 07/07/2020    Medicare annual wellness visit, initial    Essential (primary) hypertension 09/26/2022    Hypertension    Multiple myeloma    [2]   Past Surgical History:  Procedure Laterality Date    BIOPSY      CARDIAC CATHETERIZATION N/A 03/06/2024    Procedure: Endomyocardial Biopsy;  Surgeon: Carlos Calixto DO;  Location: [place] [address] Cardiac Cath Lab;  Service: Cardiovascular;  Laterality: N/A;    CARDIAC CATHETERIZATION N/A 03/06/2024    Procedure: Right Heart Cath;  Surgeon: Carlos Calixto DO;  Location: [place] [address] Cardiac Cath Lab;  Service: Cardiovascular;  Laterality: N/A;    OTHER SURGICAL HISTORY  03/30/2020    Appendectomy    OTHER SURGICAL HISTORY  03/30/2020    Tubal ligation    OTHER SURGICAL HISTORY  03/30/2020    Tumor excision    OTHER SURGICAL HISTORY  06/08/2020    Colonoscopy   [3] No Known Allergies  [4]   Social History  Tobacco Use    Smoking status: Never    Smokeless tobacco: Never   Substance Use Topics    Alcohol use: Not Currently    Drug use: Never   [5]   Family History  Problem Relation Name Age of Onset    Coronary artery disease Mother      Hyperlipidemia Mother      Hypertension Mother      Colon cancer Mother      Heart attack Mother      Thyroid disease Mother      Thyroid cancer Son      Colon cancer Other

## 2025-06-03 ENCOUNTER — TELEPHONE (OUTPATIENT)
Dept: HEMATOLOGY/ONCOLOGY | Facility: CLINIC | Age: 75
End: 2025-06-03
Payer: MEDICARE

## 2025-06-03 NOTE — TELEPHONE ENCOUNTER
Reason for Conversation  appointment    Background   Patient calling,she has an appt Friday with Celi. She typically sees her via phone visit. This appt is an in office. Can she do phone visit or does she need to be seen in office this visit? [phone]    Disposition   No disposition on file.

## 2025-06-04 LAB
ALBUMIN: 4.2 G/DL (ref 3.4–5)
ALPHA 1 GLOBULIN: 0.3 G/DL (ref 0.2–0.6)
ALPHA 2 GLOBULIN: 0.5 G/DL (ref 0.4–1.1)
BETA GLOBULIN: 0.5 G/DL (ref 0.5–1.2)
GAMMA GLOBULIN: 2.3 G/DL (ref 0.5–1.4)
IMMUNOFIXATION COMMENT: ABNORMAL
M-PROTEIN 1: 1.4 G/DL (ref ?–0)
PATH REVIEW - SERUM IMMUNOFIXATION: ABNORMAL
PATH REVIEW-SERUM PROTEIN ELECTROPHORESIS: ABNORMAL
PROTEIN ELECTROPHORESIS COMMENT: ABNORMAL

## 2025-06-06 ENCOUNTER — TELEMEDICINE (OUTPATIENT)
Dept: HEMATOLOGY/ONCOLOGY | Facility: CLINIC | Age: 75
End: 2025-06-06
Payer: MEDICARE

## 2025-06-06 ENCOUNTER — PHARMACY VISIT (OUTPATIENT)
Dept: PHARMACY | Facility: CLINIC | Age: 75
End: 2025-06-06
Payer: COMMERCIAL

## 2025-06-06 DIAGNOSIS — D50.8 IRON DEFICIENCY ANEMIA SECONDARY TO INADEQUATE DIETARY IRON INTAKE: ICD-10-CM

## 2025-06-06 DIAGNOSIS — D47.2 SMOLDERING MULTIPLE MYELOMA: Primary | ICD-10-CM

## 2025-06-06 DIAGNOSIS — C94.6 MYELODYSPLASTIC DISEASE, NOT ELSEWHERE CLASSIFIED (MULTI): ICD-10-CM

## 2025-06-06 DIAGNOSIS — E04.2 MULTIPLE THYROID NODULES: ICD-10-CM

## 2025-06-06 PROCEDURE — 99215 OFFICE O/P EST HI 40 MIN: CPT | Performed by: NURSE PRACTITIONER

## 2025-06-06 PROCEDURE — RXMED WILLOW AMBULATORY MEDICATION CHARGE

## 2025-06-06 ASSESSMENT — ENCOUNTER SYMPTOMS
EYES NEGATIVE: 1
FATIGUE: 1
EXTREMITY WEAKNESS: 0
FEVER: 0
HEMOPTYSIS: 0
DIZZINESS: 1
NECK PAIN: 1
ARTHRALGIAS: 1
COUGH: 0
ABDOMINAL DISTENTION: 0
BLOOD IN STOOL: 0
DIARRHEA: 0
SORE THROAT: 0
LEG SWELLING: 0
PALPITATIONS: 1
MYALGIAS: 1
LIGHT-HEADEDNESS: 1
HEADACHES: 0
BACK PAIN: 1
UNEXPECTED WEIGHT CHANGE: 0
ABDOMINAL PAIN: 0
HEMATOLOGIC/LYMPHATIC NEGATIVE: 1
SHORTNESS OF BREATH: 0
TROUBLE SWALLOWING: 0
CONSTIPATION: 0

## 2025-06-06 NOTE — PROGRESS NOTES
Patient ID: Mirella Renae is a 74 y.o. female.  Referring Physician: Celi Lyons PA-C  [address]  [place] [address]  Primary Care Provider: Mirtha Benjamin MD  Visit Type: Follow Up      Subjective  Telephone note, I have permission to talk to her.  Unable to travel due to nerve transplant procedure and dizziness.   HPI  74 year old woman with hx of HTN, anxiety, who is referred for leukopenia, her labs on 6/30/ 2021 and on 8/18/21 showed wbc of 2.4K and  2.6K respectively, ANC was 1.20 and 1.12 respectively, there were 1% atypical lymphs on the first differential. Hg was normal at 12.1 g/dl and platelets normal at 176. B12 level was 288.   Evaluation to date showed  normal flow cytometry 2021.  Labs 2/22/24 showed IgG 2360, H2ebnlcquevojjy 4.2, kappa 3.95, ratio 2.8, monoclonal spike 1.3 and bone marrow biopsy that showed 10-15% plasma cells.  PET scan was normal 2/23/24.  B12 was 344  and she is on supplements   She was found to be positive for EBV and CMV.   Current labs 5/30/25 showe WBC 2.5, hgb 10.6, plt 142 with ANC 1220 and normal lymphocytes.  Ferritan 157, iron 63, 5 sat 20 with B12 of 1000 on supplements.  IgG 2570, kappa 4.47 and M spike 1.4.  She is not taking iron supplements  She had a traumatic accident with several fractured spinal vertebra for which she has been incapacitated.  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   interval history -4/2/2024   She is status post endomyocardial biopsy which did not show any myeloid deposition or other significant pathology      She is on Xarelto for atrial fibrillation and is followed by cardiology for this as well as syncope and hypoxia.  She states she continues to have dizziness which cardiology feels is related to the atrial fibrillation.  She has complaints of low back and hip pain today   had her colonoscopy done march 2022 which was unremarkable     Review of  Systems   Constitutional:  Positive for fatigue. Negative for fever and unexpected weight change.   HENT:   Negative for hearing loss, lump/mass, mouth sores, nosebleeds, sore throat, tinnitus and trouble swallowing.    Eyes: Negative.    Respiratory:  Negative for cough, hemoptysis and shortness of breath.    Cardiovascular:  Positive for palpitations. Negative for chest pain and leg swelling.   Gastrointestinal:  Negative for abdominal distention, abdominal pain, blood in stool, constipation and diarrhea.   Musculoskeletal:  Positive for arthralgias, back pain, myalgias and neck pain.   Skin: Negative.    Neurological:  Positive for dizziness and light-headedness. Negative for extremity weakness and headaches.        See HPI regarding brachial plexus nerve transplant and need for electrostimulator   Surgery was 1/25/25 and she has not had recovery of neurologic sensation or function   Hematological: Negative.       Objective   BSA: There is no height or weight on file to calculate BSA.  There were no vitals taken for this visit.     has a past medical history of Anxiety disorder, unspecified (09/26/2022), Atrial fibrillation (Multi), Encounter for general adult medical examination without abnormal findings (07/07/2020), Essential (primary) hypertension (09/26/2022), and Multiple myeloma.   has a past surgical history that includes Other surgical history (03/30/2020); Other surgical history (03/30/2020); Other surgical history (03/30/2020); Other surgical history (06/08/2020); Cardiac catheterization (N/A, 03/06/2024); Cardiac catheterization (N/A, 03/06/2024); and biopsy.  Family History[1]      Mirella Renae  reports that she has never smoked. She has never used smokeless tobacco.  She  reports that she does not currently use alcohol.  She  reports no history of drug use.        WBC   Date/Time Value Ref Range Status   05/30/2025 11:37 AM 2.5 (L) 4.4 - 11.3 x10*3/uL Final   02/25/2025 11:47 AM 3.0 (L) 4.4 - 11.3  "x10*3/uL Final   01/31/2025 07:07 AM 3.8 (L) 4.4 - 11.3 x10*3/uL Final     nRBC   Date Value Ref Range Status   05/30/2025 0.0 0.0 - 0.0 /100 WBCs Final   02/25/2025 0.0 0.0 - 0.0 /100 WBCs Final   01/31/2025 0.0 0.0 - 0.0 /100 WBCs Final     RBC   Date Value Ref Range Status   05/30/2025 3.36 (L) 4.00 - 5.20 x10*6/uL Final   02/25/2025 3.34 (L) 4.00 - 5.20 x10*6/uL Final   01/31/2025 2.90 (L) 4.00 - 5.20 x10*6/uL Final     Hemoglobin   Date Value Ref Range Status   05/30/2025 10.6 (L) 12.0 - 16.0 g/dL Final   02/25/2025 10.5 (L) 12.0 - 16.0 g/dL Final   01/31/2025 8.8 (L) 12.0 - 16.0 g/dL Final     Hematocrit   Date Value Ref Range Status   05/30/2025 33.9 (L) 36.0 - 46.0 % Final   02/25/2025 33.2 (L) 36.0 - 46.0 % Final   01/31/2025 27.7 (L) 36.0 - 46.0 % Final     MCV   Date/Time Value Ref Range Status   05/30/2025 11:37  (H) 80 - 100 fL Final   02/25/2025 11:47 AM 99 80 - 100 fL Final   01/31/2025 07:07 AM 96 80 - 100 fL Final     MCH   Date/Time Value Ref Range Status   05/30/2025 11:37 AM 31.5 26.0 - 34.0 pg Final   02/25/2025 11:47 AM 31.4 26.0 - 34.0 pg Final   01/31/2025 07:07 AM 30.3 26.0 - 34.0 pg Final     MCHC   Date/Time Value Ref Range Status   05/30/2025 11:37 AM 31.3 (L) 32.0 - 36.0 g/dL Final   02/25/2025 11:47 AM 31.6 (L) 32.0 - 36.0 g/dL Final   01/31/2025 07:07 AM 31.8 (L) 32.0 - 36.0 g/dL Final     RDW   Date/Time Value Ref Range Status   05/30/2025 11:37 AM 13.6 11.5 - 14.5 % Final   02/25/2025 11:47 AM 16.3 (H) 11.5 - 14.5 % Final   01/31/2025 07:07 AM 12.8 11.5 - 14.5 % Final     Platelets   Date/Time Value Ref Range Status   05/30/2025 11:37  (L) 150 - 450 x10*3/uL Final   02/25/2025 11:47  150 - 450 x10*3/uL Final   01/31/2025 07:07  150 - 450 x10*3/uL Final     No results found for: \"MPV\"  Neutrophils %   Date/Time Value Ref Range Status   05/30/2025 11:37 AM 49.9 40.0 - 80.0 % Final   02/25/2025 11:47 AM 57.8 40.0 - 80.0 % Final   12/30/2024 10:14 AM 50.8 40.0 - " 80.0 % Final     Immature Granulocytes %, Automated   Date/Time Value Ref Range Status   05/30/2025 11:37 AM 0.4 0.0 - 0.9 % Final     Comment:     Immature Granulocyte Count (IG) includes promyelocytes, myelocytes and metamyelocytes but does not include bands. Percent differential counts (%) should be interpreted in the context of the absolute cell counts (cells/UL).   02/25/2025 11:47 AM 0.3 0.0 - 0.9 % Final     Comment:     Immature Granulocyte Count (IG) includes promyelocytes, myelocytes and metamyelocytes but does not include bands. Percent differential counts (%) should be interpreted in the context of the absolute cell counts (cells/UL).   12/30/2024 10:14 AM 0.0 0.0 - 0.9 % Final     Comment:     Immature Granulocyte Count (IG) includes promyelocytes, myelocytes and metamyelocytes but does not include bands. Percent differential counts (%) should be interpreted in the context of the absolute cell counts (cells/UL).     Lymphocytes %   Date/Time Value Ref Range Status   05/30/2025 11:37 AM 35.5 13.0 - 44.0 % Final   02/25/2025 11:47 AM 28.4 13.0 - 44.0 % Final   12/30/2024 10:14 AM 36.4 13.0 - 44.0 % Final     Monocytes %   Date/Time Value Ref Range Status   05/30/2025 11:37 AM 11.0 2.0 - 10.0 % Final   02/25/2025 11:47 AM 11.8 2.0 - 10.0 % Final   12/30/2024 10:14 AM 10.8 2.0 - 10.0 % Final     Eosinophils %   Date/Time Value Ref Range Status   05/30/2025 11:37 AM 2.0 0.0 - 6.0 % Final   02/25/2025 11:47 AM 1.0 0.0 - 6.0 % Final   12/30/2024 10:14 AM 1.3 0.0 - 6.0 % Final     Basophils %   Date/Time Value Ref Range Status   05/30/2025 11:37 AM 1.2 0.0 - 2.0 % Final   02/25/2025 11:47 AM 0.7 0.0 - 2.0 % Final   12/30/2024 10:14 AM 0.7 0.0 - 2.0 % Final     Neutrophils Absolute   Date/Time Value Ref Range Status   05/30/2025 11:37 AM 1.22 (L) 1.60 - 5.50 x10*3/uL Final     Comment:     Percent differential counts (%) should be interpreted in the context of the absolute cell counts (cells/uL).   02/25/2025  11:47 AM 1.71 1.60 - 5.50 x10*3/uL Final     Comment:     Percent differential counts (%) should be interpreted in the context of the absolute cell counts (cells/uL).   12/30/2024 10:14 AM 1.51 (L) 1.60 - 5.50 x10*3/uL Final     Comment:     Percent differential counts (%) should be interpreted in the context of the absolute cell counts (cells/uL).     Immature Granulocytes Absolute, Automated   Date/Time Value Ref Range Status   05/30/2025 11:37 AM 0.01 0.00 - 0.50 x10*3/uL Final   02/25/2025 11:47 AM 0.01 0.00 - 0.50 x10*3/uL Final   12/30/2024 10:14 AM 0.00 0.00 - 0.50 x10*3/uL Final     Lymphocytes Absolute   Date/Time Value Ref Range Status   05/30/2025 11:37 AM 0.87 0.80 - 3.00 x10*3/uL Final   02/25/2025 11:47 AM 0.84 0.80 - 3.00 x10*3/uL Final   12/30/2024 10:14 AM 1.08 0.80 - 3.00 x10*3/uL Final     Monocytes Absolute   Date/Time Value Ref Range Status   05/30/2025 11:37 AM 0.27 0.05 - 0.80 x10*3/uL Final   02/25/2025 11:47 AM 0.35 0.05 - 0.80 x10*3/uL Final   12/30/2024 10:14 AM 0.32 0.05 - 0.80 x10*3/uL Final     Eosinophils Absolute   Date/Time Value Ref Range Status   05/30/2025 11:37 AM 0.05 0.00 - 0.40 x10*3/uL Final   02/25/2025 11:47 AM 0.03 0.00 - 0.40 x10*3/uL Final   12/30/2024 10:14 AM 0.04 0.00 - 0.40 x10*3/uL Final     Basophils Absolute   Date/Time Value Ref Range Status   05/30/2025 11:37 AM 0.03 0.00 - 0.10 x10*3/uL Final   02/25/2025 11:47 AM 0.02 0.00 - 0.10 x10*3/uL Final   12/30/2024 10:14 AM 0.02 0.00 - 0.10 x10*3/uL Final         Assessment/Plan    1. Smoldering multiple myeloma   bone marrow biopsy showed 10-15% plasma cells   no evidence of end organ damage , PET scan 2/23/24 without any bone lesions   low risk SMM based on current M spike stabel at 1.3 , FLC and PC percentage   plan for surveillance , the repeat labs demonstrate overall stability of her counts as well as of the paraprotein levels   We will need to repeat and follow these levels  Skeletal survey ordered         4/28/23- she has noticed increasing dizziness episodes suggested by cardiology due to low BP or atrial fibrillation  additionally secondary to her arrythmia, cardiology suggested doing a cardiac MRI but she cannot secondary to marked claustrophobia and  cardiac biopsy 3/6/24 negative for [place] red  Maintained on Xarelto     2. Leukopenia with mild neutropenia   differential diagnosis and work up discussed with patient   plan to obtain the following testing   -nutritional deficiencies: check folic acid and start PO b12 for borderline level   she has improved B12 levels on B12 supplement  -peripheral blood flow cytometry for phenotypic abnormalities or abnormal cell population  Myeloid malignancy and BCR/ABL negative  -viral testing hep and HIV negative, but positive for CMV and EBV     3. dizziness, fatigue   at this time we will refer to neurology for autonomic dysfunction evaluation has not done this yet  she additionally has A flutter and could potentially be contributing to symptoms      4.  Abnormal thyroid uptake on the PET scan  Will order ultrasound     4/2/2024-ultrasound of the thyroid showed multiple TI-RADS 4 nodules, will refer to ENT for consideration of FNA of the largest nodule         RTC 6 m with labs, but must come to office.  If it is too far she should establish care with local provider before then          Diagnoses and all orders for this visit:  Smoldering multiple myeloma  -     TSH with reflex to Free T4 if abnormal; Future  -     Vitamin B12; Future  -     Ferritin; Future  -     Iron and TIBC; Future  -     Serum Protein Electrophoresis + Immunofixation; Future  -     [place]/Lambda Free Light Chain, Serum; Future  -     Immunoglobulins (IgG, IgA, IgM); Future  -     Comprehensive Metabolic Panel; Future  -     CBC and Auto Differential; Future  -     Clinic Appointment Request  -     XR skeletal survey complete; Future  Iron deficiency anemia secondary to inadequate dietary iron intake  -      Ferritin; Future  -     Iron and TIBC; Future  Multiple thyroid nodules  -     TSH with reflex to Free T4 if abnormal; Future           Celi Lyons PA-C                              [1]   Family History  Problem Relation Name Age of Onset    Coronary artery disease Mother      Hyperlipidemia Mother      Hypertension Mother      Colon cancer Mother      Heart attack Mother      Thyroid disease Mother      Thyroid cancer Son      Colon cancer Other

## 2025-06-17 PROCEDURE — RXMED WILLOW AMBULATORY MEDICATION CHARGE

## 2025-06-18 ENCOUNTER — PHARMACY VISIT (OUTPATIENT)
Dept: PHARMACY | Facility: CLINIC | Age: 75
End: 2025-06-18
Payer: COMMERCIAL

## 2025-06-18 PROCEDURE — RXMED WILLOW AMBULATORY MEDICATION CHARGE

## 2025-07-07 ENCOUNTER — HOSPITAL ENCOUNTER (OUTPATIENT)
Dept: RADIOLOGY | Facility: HOSPITAL | Age: 75
Discharge: HOME | End: 2025-07-07
Payer: MEDICARE

## 2025-07-07 DIAGNOSIS — D47.2 SMOLDERING MULTIPLE MYELOMA: ICD-10-CM

## 2025-07-07 PROCEDURE — 77075 RADEX OSSEOUS SURVEY COMPL: CPT | Performed by: RADIOLOGY

## 2025-07-07 PROCEDURE — 77075 RADEX OSSEOUS SURVEY COMPL: CPT

## 2025-07-15 ENCOUNTER — DOCUMENTATION (OUTPATIENT)
Dept: HEMATOLOGY/ONCOLOGY | Facility: CLINIC | Age: 75
End: 2025-07-15
Payer: MEDICARE

## 2025-07-15 NOTE — PROGRESS NOTES
[facility]  keletal survey wtihout evidence of multiple myeloma  Hope she is doing well with the Flintstones  Celi Lyons PA-C

## 2025-07-18 ENCOUNTER — PHARMACY VISIT (OUTPATIENT)
Dept: PHARMACY | Facility: CLINIC | Age: 75
End: 2025-07-18
Payer: COMMERCIAL

## 2025-07-18 PROCEDURE — RXMED WILLOW AMBULATORY MEDICATION CHARGE

## 2025-08-19 ENCOUNTER — TELEPHONE (OUTPATIENT)
Age: 75
End: 2025-08-19
Payer: MEDICARE

## 2025-08-19 DIAGNOSIS — S14.3XXA INJURY OF BRACHIAL PLEXUS, INITIAL ENCOUNTER: ICD-10-CM

## 2025-08-19 PROCEDURE — RXMED WILLOW AMBULATORY MEDICATION CHARGE

## 2025-08-19 RX ORDER — PREGABALIN 75 MG/1
75 CAPSULE ORAL 2 TIMES DAILY
Qty: 60 CAPSULE | Refills: 2 | Status: SHIPPED | OUTPATIENT
Start: 2025-08-19

## 2025-08-21 ENCOUNTER — PHARMACY VISIT (OUTPATIENT)
Dept: PHARMACY | Facility: CLINIC | Age: 75
End: 2025-08-21
Payer: COMMERCIAL

## 2025-08-21 RX ORDER — PREGABALIN 75 MG/1
75 CAPSULE ORAL
Qty: 90 CAPSULE | Refills: 2 | OUTPATIENT
Start: 2025-08-21 | End: 2025-11-19

## 2025-08-25 ENCOUNTER — PHARMACY VISIT (OUTPATIENT)
Dept: PHARMACY | Facility: CLINIC | Age: 75
End: 2025-08-25
Payer: COMMERCIAL

## 2025-08-25 PROCEDURE — RXMED WILLOW AMBULATORY MEDICATION CHARGE

## 2025-09-25 ENCOUNTER — APPOINTMENT (OUTPATIENT)
Age: 75
End: 2025-09-25
Payer: MEDICARE

## 2025-12-02 ENCOUNTER — APPOINTMENT (OUTPATIENT)
Dept: CARDIOLOGY | Facility: CLINIC | Age: 75
End: 2025-12-02
Payer: MEDICARE

## (undated) DEVICE — FORCEPS, BIOPSY, BIPAL 7FR, JAW 2.2/50

## (undated) DEVICE — CATHETER, THERMODILUTION, SWAN GANZ, HI-SHORE, COATED, W/GUIDEWIRE, 7 FR, 110 CM

## (undated) DEVICE — SHEATH, PINNACLE, 10 CM,  7FR INTRODUCER, 7FR DIA, 2.5 CM DIALATOR

## (undated) DEVICE — ACCESS KIT, S-MAK MINI, 4FR 10CM 0.018IN 40CM, NT/PT, ECHO ENHANCE NEEDLE